# Patient Record
Sex: FEMALE | Race: WHITE | NOT HISPANIC OR LATINO | Employment: OTHER | URBAN - METROPOLITAN AREA
[De-identification: names, ages, dates, MRNs, and addresses within clinical notes are randomized per-mention and may not be internally consistent; named-entity substitution may affect disease eponyms.]

---

## 2017-04-11 ENCOUNTER — HISTORICAL (OUTPATIENT)
Dept: RADIOLOGY | Facility: HOSPITAL | Age: 57
End: 2017-04-11

## 2017-04-12 ENCOUNTER — HISTORICAL (OUTPATIENT)
Dept: HEMATOLOGY/ONCOLOGY | Facility: CLINIC | Age: 57
End: 2017-04-12

## 2017-06-01 ENCOUNTER — HISTORICAL (OUTPATIENT)
Dept: LAB | Facility: HOSPITAL | Age: 57
End: 2017-06-01

## 2017-06-01 LAB
ABS NEUT (OLG): 6.91 X10(3)/MCL (ref 2.1–9.2)
ALBUMIN SERPL-MCNC: 3.4 GM/DL (ref 3.4–5)
ALBUMIN/GLOB SERPL: 0.9 RATIO (ref 1.1–2)
ALP SERPL-CCNC: 77 UNIT/L (ref 38–126)
ALT SERPL-CCNC: 25 UNIT/L (ref 12–78)
APPEARANCE, UA: CLEAR
APTT PPP: 27 SECOND(S) (ref 20.6–36)
AST SERPL-CCNC: 24 UNIT/L (ref 15–37)
BACTERIA SPEC CULT: NORMAL /HPF
BASOPHILS # BLD AUTO: 0.1 X10(3)/MCL (ref 0–0.2)
BASOPHILS NFR BLD AUTO: 1 %
BILIRUB SERPL-MCNC: 0.6 MG/DL (ref 0.2–1)
BILIRUB UR QL STRIP: NEGATIVE
BILIRUBIN DIRECT+TOT PNL SERPL-MCNC: 0.2 MG/DL (ref 0–0.5)
BILIRUBIN DIRECT+TOT PNL SERPL-MCNC: 0.4 MG/DL (ref 0–0.8)
BUN SERPL-MCNC: 9 MG/DL (ref 7–18)
CALCIUM SERPL-MCNC: 9 MG/DL (ref 8.5–10.1)
CHLORIDE SERPL-SCNC: 104 MMOL/L (ref 98–107)
CO2 SERPL-SCNC: 24 MMOL/L (ref 21–32)
COLOR UR: YELLOW
CREAT SERPL-MCNC: 0.57 MG/DL (ref 0.55–1.02)
EOSINOPHIL # BLD AUTO: 0.2 X10(3)/MCL (ref 0–0.9)
EOSINOPHIL NFR BLD AUTO: 2 %
ERYTHROCYTE [DISTWIDTH] IN BLOOD BY AUTOMATED COUNT: 14.2 % (ref 11.5–17)
GLOBULIN SER-MCNC: 3.9 GM/DL (ref 2.4–3.5)
GLUCOSE (UA): NEGATIVE
GLUCOSE SERPL-MCNC: 99 MG/DL (ref 74–106)
HCT VFR BLD AUTO: 44.5 % (ref 37–47)
HGB BLD-MCNC: 15.1 GM/DL (ref 12–16)
HGB UR QL STRIP: NEGATIVE
INR PPP: 0.99 (ref 0–1.27)
KETONES UR QL STRIP: NEGATIVE
LEUKOCYTE ESTERASE UR QL STRIP: NEGATIVE
LYMPHOCYTES # BLD AUTO: 2.3 X10(3)/MCL (ref 0.6–4.6)
LYMPHOCYTES NFR BLD AUTO: 22 %
MCH RBC QN AUTO: 32.1 PG (ref 27–31)
MCHC RBC AUTO-ENTMCNC: 33.9 GM/DL (ref 33–36)
MCV RBC AUTO: 94.5 FL (ref 80–94)
MONOCYTES # BLD AUTO: 0.8 X10(3)/MCL (ref 0.1–1.3)
MONOCYTES NFR BLD AUTO: 8 %
NEUTROPHILS # BLD AUTO: 6.91 X10(3)/MCL (ref 1.4–7.9)
NEUTROPHILS NFR BLD AUTO: 67 %
NITRITE UR QL STRIP: NEGATIVE
PH UR STRIP: 5 [PH] (ref 5–9)
PLATELET # BLD AUTO: 181 X10(3)/MCL (ref 130–400)
PMV BLD AUTO: 9.7 FL (ref 9.4–12.4)
POTASSIUM SERPL-SCNC: 4 MMOL/L (ref 3.5–5.1)
PROT SERPL-MCNC: 7.3 GM/DL (ref 6.4–8.2)
PROT UR QL STRIP: NEGATIVE
PROTHROMBIN TIME: 12.9 SECOND(S) (ref 12.1–14.2)
RBC # BLD AUTO: 4.71 X10(6)/MCL (ref 4.2–5.4)
RBC #/AREA URNS HPF: NORMAL /[HPF]
SODIUM SERPL-SCNC: 140 MMOL/L (ref 136–145)
SP GR UR STRIP: 1.01 (ref 1–1.03)
SQUAMOUS EPITHELIAL, UA: NORMAL
UROBILINOGEN UR STRIP-ACNC: 0.2
WBC # SPEC AUTO: 10.3 X10(3)/MCL (ref 4.5–11.5)
WBC #/AREA URNS HPF: NORMAL /[HPF]

## 2017-07-13 ENCOUNTER — HISTORICAL (OUTPATIENT)
Dept: ADMINISTRATIVE | Facility: HOSPITAL | Age: 57
End: 2017-07-13

## 2017-07-13 LAB
ABS NEUT (OLG): 5.8 X10(3)/MCL (ref 2.1–9.2)
ALBUMIN SERPL-MCNC: 3.9 GM/DL (ref 3.4–5)
ALP SERPL-CCNC: 75 UNIT/L (ref 45–117)
ALT SERPL-CCNC: 27 UNIT/L (ref 12–78)
AST SERPL-CCNC: 27 UNIT/L (ref 15–37)
BASOPHILS # BLD AUTO: 0.06 X10(3)/MCL
BASOPHILS NFR BLD AUTO: 1 % (ref 0–1)
BILIRUB SERPL-MCNC: 0.4 MG/DL (ref 0.2–1)
BILIRUBIN DIRECT+TOT PNL SERPL-MCNC: <0.1 MG/DL
BILIRUBIN DIRECT+TOT PNL SERPL-MCNC: >0.3 MG/DL
BUN SERPL-MCNC: 11 MG/DL (ref 7–18)
CALCIUM SERPL-MCNC: 9.3 MG/DL (ref 8.5–10.1)
CHLORIDE SERPL-SCNC: 102 MMOL/L (ref 98–107)
CO2 SERPL-SCNC: 26 MMOL/L (ref 21–32)
CREAT SERPL-MCNC: 0.8 MG/DL (ref 0.6–1.3)
EOSINOPHIL # BLD AUTO: 0.31 10*3/UL
EOSINOPHIL NFR BLD AUTO: 3 % (ref 0–5)
ERYTHROCYTE [DISTWIDTH] IN BLOOD BY AUTOMATED COUNT: 13.7 % (ref 11.5–14.5)
GLUCOSE SERPL-MCNC: 133 MG/DL (ref 74–106)
HCT VFR BLD AUTO: 42.3 % (ref 35–46)
HGB BLD-MCNC: 14.4 GM/DL (ref 12–16)
IMM GRANULOCYTES # BLD AUTO: 0.03 10*3/UL
IMM GRANULOCYTES NFR BLD AUTO: 0 %
LYMPHOCYTES # BLD AUTO: 2.72 X10(3)/MCL
LYMPHOCYTES NFR BLD AUTO: 28 % (ref 15–40)
MCH RBC QN AUTO: 31.6 PG (ref 26–34)
MCHC RBC AUTO-ENTMCNC: 34 GM/DL (ref 31–37)
MCV RBC AUTO: 92.8 FL (ref 80–100)
MONOCYTES # BLD AUTO: 0.76 X10(3)/MCL
MONOCYTES NFR BLD AUTO: 8 % (ref 4–12)
NEUTROPHILS # BLD AUTO: 5.8 X10(3)/MCL
NEUTROPHILS NFR BLD AUTO: 60 X10(3)/MCL
PLATELET # BLD AUTO: 221 X10(3)/MCL (ref 130–400)
PMV BLD AUTO: 9.6 FL (ref 7.4–10.4)
POTASSIUM SERPL-SCNC: 3.6 MMOL/L (ref 3.5–5.1)
PROT SERPL-MCNC: 7.6 GM/DL (ref 6.4–8.2)
RBC # BLD AUTO: 4.56 X10(6)/MCL (ref 4–5.2)
SODIUM SERPL-SCNC: 139 MMOL/L (ref 136–145)
WBC # SPEC AUTO: 9.7 X10(3)/MCL (ref 4.5–11)

## 2018-01-15 ENCOUNTER — HISTORICAL (OUTPATIENT)
Dept: RADIOLOGY | Facility: HOSPITAL | Age: 58
End: 2018-01-15

## 2018-01-15 LAB
ABS NEUT (OLG): 5.17 X10(3)/MCL (ref 2.1–9.2)
ALBUMIN SERPL-MCNC: 3.5 GM/DL (ref 3.4–5)
ALBUMIN/GLOB SERPL: 1 RATIO (ref 1–2)
ALP SERPL-CCNC: 176 UNIT/L (ref 45–117)
ALT SERPL-CCNC: 193 UNIT/L (ref 12–78)
AST SERPL-CCNC: 276 UNIT/L (ref 15–37)
BASOPHILS # BLD AUTO: 0.06 X10(3)/MCL
BASOPHILS NFR BLD AUTO: 1 % (ref 0–1)
BILIRUB SERPL-MCNC: 0.4 MG/DL (ref 0.2–1)
BILIRUBIN DIRECT+TOT PNL SERPL-MCNC: 0.2 MG/DL
BILIRUBIN DIRECT+TOT PNL SERPL-MCNC: 0.2 MG/DL
BUN SERPL-MCNC: 7 MG/DL (ref 7–18)
CALCIUM SERPL-MCNC: 8.9 MG/DL (ref 8.5–10.1)
CHLORIDE SERPL-SCNC: 102 MMOL/L (ref 98–107)
CO2 SERPL-SCNC: 29 MMOL/L (ref 21–32)
CREAT SERPL-MCNC: 0.5 MG/DL (ref 0.6–1.3)
EOSINOPHIL # BLD AUTO: 0.06 X10(3)/MCL
EOSINOPHIL NFR BLD AUTO: 1 % (ref 0–5)
ERYTHROCYTE [DISTWIDTH] IN BLOOD BY AUTOMATED COUNT: 14.5 % (ref 11.5–14.5)
GLOBULIN SER-MCNC: 4.4 GM/ML (ref 2.3–3.5)
GLUCOSE SERPL-MCNC: 99 MG/DL (ref 74–106)
HCT VFR BLD AUTO: 41.3 % (ref 35–46)
HGB BLD-MCNC: 14.2 GM/DL (ref 12–16)
IMM GRANULOCYTES # BLD AUTO: 0.01 10*3/UL
IMM GRANULOCYTES NFR BLD AUTO: 0 %
LYMPHOCYTES # BLD AUTO: 1.83 X10(3)/MCL
LYMPHOCYTES NFR BLD AUTO: 23 % (ref 15–40)
MCH RBC QN AUTO: 33.3 PG (ref 26–34)
MCHC RBC AUTO-ENTMCNC: 34.4 GM/DL (ref 31–37)
MCV RBC AUTO: 96.7 FL (ref 80–100)
MONOCYTES # BLD AUTO: 0.7 X10(3)/MCL
MONOCYTES NFR BLD AUTO: 9 % (ref 4–12)
NEUTROPHILS # BLD AUTO: 5.17 X10(3)/MCL
NEUTROPHILS NFR BLD AUTO: 66 X10(3)/MCL
PLATELET # BLD AUTO: 183 X10(3)/MCL (ref 130–400)
PMV BLD AUTO: 10.4 FL (ref 7.4–10.4)
POTASSIUM SERPL-SCNC: 4.2 MMOL/L (ref 3.5–5.1)
PROT SERPL-MCNC: 7.9 GM/DL (ref 6.4–8.2)
RBC # BLD AUTO: 4.27 X10(6)/MCL (ref 4–5.2)
SODIUM SERPL-SCNC: 139 MMOL/L (ref 136–145)
WBC # SPEC AUTO: 7.8 X10(3)/MCL (ref 4.5–11)

## 2018-01-23 ENCOUNTER — HISTORICAL (OUTPATIENT)
Dept: ADMINISTRATIVE | Facility: HOSPITAL | Age: 58
End: 2018-01-23

## 2018-01-23 LAB
ABS NEUT (OLG): 5.58 X10(3)/MCL (ref 2.1–9.2)
ALBUMIN SERPL-MCNC: 3.9 GM/DL (ref 3.4–5)
ALBUMIN/GLOB SERPL: 1 RATIO (ref 1–2)
ALP SERPL-CCNC: 153 UNIT/L (ref 45–117)
ALT SERPL-CCNC: 95 UNIT/L (ref 12–78)
AST SERPL-CCNC: 93 UNIT/L (ref 15–37)
BASOPHILS # BLD AUTO: 0.06 X10(3)/MCL
BASOPHILS NFR BLD AUTO: 1 % (ref 0–1)
BILIRUB SERPL-MCNC: 0.6 MG/DL (ref 0.2–1)
BILIRUBIN DIRECT+TOT PNL SERPL-MCNC: 0.2 MG/DL
BILIRUBIN DIRECT+TOT PNL SERPL-MCNC: 0.4 MG/DL
BUN SERPL-MCNC: 9 MG/DL (ref 7–18)
CALCIUM SERPL-MCNC: 9.7 MG/DL (ref 8.5–10.1)
CHLORIDE SERPL-SCNC: 105 MMOL/L (ref 98–107)
CO2 SERPL-SCNC: 28 MMOL/L (ref 21–32)
CREAT SERPL-MCNC: 0.6 MG/DL (ref 0.6–1.3)
EOSINOPHIL # BLD AUTO: 0.07 10*3/UL
EOSINOPHIL NFR BLD AUTO: 1 % (ref 0–5)
ERYTHROCYTE [DISTWIDTH] IN BLOOD BY AUTOMATED COUNT: 14.3 % (ref 11.5–14.5)
GLOBULIN SER-MCNC: 4.3 GM/ML (ref 2.3–3.5)
GLUCOSE SERPL-MCNC: 108 MG/DL (ref 74–106)
HCT VFR BLD AUTO: 43 % (ref 35–46)
HGB BLD-MCNC: 14.8 GM/DL (ref 12–16)
IMM GRANULOCYTES # BLD AUTO: 0.05 10*3/UL
IMM GRANULOCYTES NFR BLD AUTO: 1 %
LYMPHOCYTES # BLD AUTO: 1.82 X10(3)/MCL
LYMPHOCYTES NFR BLD AUTO: 22 % (ref 15–40)
MCH RBC QN AUTO: 33.4 PG (ref 26–34)
MCHC RBC AUTO-ENTMCNC: 34.4 GM/DL (ref 31–37)
MCV RBC AUTO: 97.1 FL (ref 80–100)
MONOCYTES # BLD AUTO: 0.64 X10(3)/MCL
MONOCYTES NFR BLD AUTO: 8 % (ref 4–12)
NEUTROPHILS # BLD AUTO: 5.58 X10(3)/MCL
NEUTROPHILS NFR BLD AUTO: 68 X10(3)/MCL
PLATELET # BLD AUTO: 188 X10(3)/MCL (ref 130–400)
PMV BLD AUTO: 10 FL (ref 7.4–10.4)
POTASSIUM SERPL-SCNC: 3.8 MMOL/L (ref 3.5–5.1)
PROT SERPL-MCNC: 8.2 GM/DL (ref 6.4–8.2)
RBC # BLD AUTO: 4.43 X10(6)/MCL (ref 4–5.2)
SODIUM SERPL-SCNC: 143 MMOL/L (ref 136–145)
WBC # SPEC AUTO: 8.2 X10(3)/MCL (ref 4.5–11)

## 2018-07-24 ENCOUNTER — HISTORICAL (OUTPATIENT)
Dept: RADIOLOGY | Facility: HOSPITAL | Age: 58
End: 2018-07-24

## 2018-07-24 LAB
ABS NEUT (OLG): 3.42 X10(3)/MCL (ref 2.1–9.2)
ALBUMIN SERPL-MCNC: 3.6 GM/DL (ref 3.4–5)
ALBUMIN/GLOB SERPL: 1 RATIO (ref 1–2)
ALP SERPL-CCNC: 87 UNIT/L (ref 45–117)
ALT SERPL-CCNC: 58 UNIT/L (ref 12–78)
AST SERPL-CCNC: 89 UNIT/L (ref 15–37)
BASOPHILS # BLD AUTO: 0.06 X10(3)/MCL
BASOPHILS NFR BLD AUTO: 1 %
BILIRUB SERPL-MCNC: 0.7 MG/DL (ref 0.2–1)
BILIRUBIN DIRECT+TOT PNL SERPL-MCNC: 0.2 MG/DL
BILIRUBIN DIRECT+TOT PNL SERPL-MCNC: 0.5 MG/DL
BUN SERPL-MCNC: 9 MG/DL (ref 7–18)
CALCIUM SERPL-MCNC: 9.5 MG/DL (ref 8.5–10.1)
CHLORIDE SERPL-SCNC: 108 MMOL/L (ref 98–107)
CO2 SERPL-SCNC: 26 MMOL/L (ref 21–32)
CREAT SERPL-MCNC: 0.7 MG/DL (ref 0.6–1.3)
EOSINOPHIL # BLD AUTO: 0.22 X10(3)/MCL
EOSINOPHIL NFR BLD AUTO: 3 %
ERYTHROCYTE [DISTWIDTH] IN BLOOD BY AUTOMATED COUNT: 12.4 % (ref 11.5–14.5)
GLOBULIN SER-MCNC: 3.9 GM/ML (ref 2.3–3.5)
GLUCOSE SERPL-MCNC: 109 MG/DL (ref 74–106)
HCT VFR BLD AUTO: 45.2 % (ref 35–46)
HGB BLD-MCNC: 15.2 GM/DL (ref 12–16)
IMM GRANULOCYTES # BLD AUTO: 0.01 10*3/UL
IMM GRANULOCYTES NFR BLD AUTO: 0 %
LYMPHOCYTES # BLD AUTO: 2.54 X10(3)/MCL
LYMPHOCYTES NFR BLD AUTO: 37 % (ref 13–40)
MCH RBC QN AUTO: 32.5 PG (ref 26–34)
MCHC RBC AUTO-ENTMCNC: 33.6 GM/DL (ref 31–37)
MCV RBC AUTO: 96.8 FL (ref 80–100)
MONOCYTES # BLD AUTO: 0.58 X10(3)/MCL
MONOCYTES NFR BLD AUTO: 8 % (ref 4–12)
NEUTROPHILS # BLD AUTO: 3.42 X10(3)/MCL
NEUTROPHILS NFR BLD AUTO: 50 X10(3)/MCL
PLATELET # BLD AUTO: 191 X10(3)/MCL (ref 130–400)
PMV BLD AUTO: 9.9 FL (ref 7.4–10.4)
POTASSIUM SERPL-SCNC: 4.3 MMOL/L (ref 3.5–5.1)
PROT SERPL-MCNC: 7.5 GM/DL (ref 6.4–8.2)
RBC # BLD AUTO: 4.67 X10(6)/MCL (ref 4–5.2)
SODIUM SERPL-SCNC: 143 MMOL/L (ref 136–145)
WBC # SPEC AUTO: 6.8 X10(3)/MCL (ref 4.5–11)

## 2018-08-01 ENCOUNTER — HISTORICAL (OUTPATIENT)
Dept: ADMINISTRATIVE | Facility: HOSPITAL | Age: 58
End: 2018-08-01

## 2018-08-01 LAB
ABS NEUT (OLG): 4.48 X10(3)/MCL (ref 2.1–9.2)
ALBUMIN SERPL-MCNC: 3.5 GM/DL (ref 3.4–5)
ALBUMIN/GLOB SERPL: 1 RATIO (ref 1–2)
ALP SERPL-CCNC: 84 UNIT/L (ref 45–117)
ALT SERPL-CCNC: 64 UNIT/L (ref 12–78)
AST SERPL-CCNC: 55 UNIT/L (ref 15–37)
BASOPHILS # BLD AUTO: 0.07 X10(3)/MCL
BASOPHILS NFR BLD AUTO: 1 %
BILIRUB SERPL-MCNC: 0.3 MG/DL (ref 0.2–1)
BILIRUBIN DIRECT+TOT PNL SERPL-MCNC: <0.1 MG/DL
BILIRUBIN DIRECT+TOT PNL SERPL-MCNC: ABNORMAL MG/DL
BUN SERPL-MCNC: 14 MG/DL (ref 7–18)
CALCIUM SERPL-MCNC: 8.4 MG/DL (ref 8.5–10.1)
CHLORIDE SERPL-SCNC: 103 MMOL/L (ref 98–107)
CO2 SERPL-SCNC: 26 MMOL/L (ref 21–32)
CREAT SERPL-MCNC: 0.9 MG/DL (ref 0.6–1.3)
EOSINOPHIL # BLD AUTO: 0.17 10*3/UL
EOSINOPHIL NFR BLD AUTO: 2 %
ERYTHROCYTE [DISTWIDTH] IN BLOOD BY AUTOMATED COUNT: 12.5 % (ref 11.5–14.5)
GLOBULIN SER-MCNC: 3.9 GM/ML (ref 2.3–3.5)
GLUCOSE SERPL-MCNC: 89 MG/DL (ref 74–106)
HCT VFR BLD AUTO: 41.9 % (ref 35–46)
HGB BLD-MCNC: 14.2 GM/DL (ref 12–16)
IMM GRANULOCYTES # BLD AUTO: 0.03 10*3/UL
IMM GRANULOCYTES NFR BLD AUTO: 0 %
LYMPHOCYTES # BLD AUTO: 2.81 X10(3)/MCL
LYMPHOCYTES NFR BLD AUTO: 34 % (ref 13–40)
MCH RBC QN AUTO: 32.9 PG (ref 26–34)
MCHC RBC AUTO-ENTMCNC: 33.9 GM/DL (ref 31–37)
MCV RBC AUTO: 97.2 FL (ref 80–100)
MONOCYTES # BLD AUTO: 0.62 X10(3)/MCL
MONOCYTES NFR BLD AUTO: 8 % (ref 4–12)
NEUTROPHILS # BLD AUTO: 4.48 X10(3)/MCL
NEUTROPHILS NFR BLD AUTO: 55 X10(3)/MCL
PLATELET # BLD AUTO: 140 X10(3)/MCL (ref 130–400)
PMV BLD AUTO: 10 FL (ref 7.4–10.4)
POTASSIUM SERPL-SCNC: 3.6 MMOL/L (ref 3.5–5.1)
PROT SERPL-MCNC: 7.4 GM/DL (ref 6.4–8.2)
RBC # BLD AUTO: 4.31 X10(6)/MCL (ref 4–5.2)
SODIUM SERPL-SCNC: 139 MMOL/L (ref 136–145)
WBC # SPEC AUTO: 8.2 X10(3)/MCL (ref 4.5–11)

## 2019-01-30 ENCOUNTER — HISTORICAL (OUTPATIENT)
Dept: HEMATOLOGY/ONCOLOGY | Facility: CLINIC | Age: 59
End: 2019-01-30

## 2019-01-30 LAB
ABS NEUT (OLG): 4.07 X10(3)/MCL (ref 2.1–9.2)
ALBUMIN SERPL-MCNC: 3.9 GM/DL (ref 3.4–5)
ALBUMIN/GLOB SERPL: 0.8 RATIO (ref 1.1–2)
ALP SERPL-CCNC: 86 UNIT/L (ref 45–117)
ALT SERPL-CCNC: 62 UNIT/L (ref 12–78)
AST SERPL-CCNC: 55 UNIT/L (ref 15–37)
BASOPHILS # BLD AUTO: 0.09 X10(3)/MCL
BASOPHILS NFR BLD AUTO: 1 %
BILIRUB SERPL-MCNC: 0.5 MG/DL (ref 0.2–1)
BILIRUBIN DIRECT+TOT PNL SERPL-MCNC: 0.2 MG/DL
BILIRUBIN DIRECT+TOT PNL SERPL-MCNC: 0.3 MG/DL
BUN SERPL-MCNC: 14 MG/DL (ref 7–18)
CALCIUM SERPL-MCNC: 9.9 MG/DL (ref 8.5–10.1)
CHLORIDE SERPL-SCNC: 103 MMOL/L (ref 98–107)
CO2 SERPL-SCNC: 31 MMOL/L (ref 21–32)
CREAT SERPL-MCNC: 0.9 MG/DL (ref 0.6–1.3)
EOSINOPHIL # BLD AUTO: 0.22 X10(3)/MCL
EOSINOPHIL NFR BLD AUTO: 3 %
ERYTHROCYTE [DISTWIDTH] IN BLOOD BY AUTOMATED COUNT: 13.3 % (ref 11.5–14.5)
GLOBULIN SER-MCNC: 4.7 GM/ML (ref 2.3–3.5)
GLUCOSE SERPL-MCNC: 80 MG/DL (ref 74–106)
HCT VFR BLD AUTO: 45.5 % (ref 35–46)
HGB BLD-MCNC: 15.1 GM/DL (ref 12–16)
IMM GRANULOCYTES # BLD AUTO: 0.02 10*3/UL
IMM GRANULOCYTES NFR BLD AUTO: 0 %
LYMPHOCYTES # BLD AUTO: 2.8 X10(3)/MCL
LYMPHOCYTES NFR BLD AUTO: 35 % (ref 13–40)
MCH RBC QN AUTO: 31.9 PG (ref 26–34)
MCHC RBC AUTO-ENTMCNC: 33.2 GM/DL (ref 31–37)
MCV RBC AUTO: 96.2 FL (ref 80–100)
MONOCYTES # BLD AUTO: 0.87 X10(3)/MCL
MONOCYTES NFR BLD AUTO: 11 % (ref 4–12)
NEUTROPHILS # BLD AUTO: 4.07 X10(3)/MCL
NEUTROPHILS NFR BLD AUTO: 50 X10(3)/MCL
PLATELET # BLD AUTO: 202 X10(3)/MCL (ref 130–400)
PMV BLD AUTO: 10.1 FL (ref 7.4–10.4)
POTASSIUM SERPL-SCNC: 4.1 MMOL/L (ref 3.5–5.1)
PROT SERPL-MCNC: 8.6 GM/DL (ref 6.4–8.2)
RBC # BLD AUTO: 4.73 X10(6)/MCL (ref 4–5.2)
SODIUM SERPL-SCNC: 139 MMOL/L (ref 136–145)
WBC # SPEC AUTO: 8.1 X10(3)/MCL (ref 4.5–11)

## 2019-02-01 ENCOUNTER — HISTORICAL (OUTPATIENT)
Dept: RADIOLOGY | Facility: HOSPITAL | Age: 59
End: 2019-02-01

## 2019-04-04 ENCOUNTER — HISTORICAL (OUTPATIENT)
Dept: RADIOLOGY | Facility: HOSPITAL | Age: 59
End: 2019-04-04

## 2019-04-10 ENCOUNTER — HISTORICAL (OUTPATIENT)
Dept: ADMINISTRATIVE | Facility: HOSPITAL | Age: 59
End: 2019-04-10

## 2019-04-10 LAB
ABS NEUT (OLG): 4.69 X10(3)/MCL (ref 2.1–9.2)
ALBUMIN SERPL-MCNC: 4 GM/DL (ref 3.4–5)
ALBUMIN/GLOB SERPL: 1 RATIO (ref 1.1–2)
ALP SERPL-CCNC: 82 UNIT/L (ref 45–117)
ALT SERPL-CCNC: 48 UNIT/L (ref 12–78)
AST SERPL-CCNC: 71 UNIT/L (ref 15–37)
BASOPHILS # BLD AUTO: 0.06 X10(3)/MCL
BASOPHILS NFR BLD AUTO: 1 %
BILIRUB SERPL-MCNC: 0.8 MG/DL (ref 0.2–1)
BILIRUBIN DIRECT+TOT PNL SERPL-MCNC: 0.3 MG/DL
BILIRUBIN DIRECT+TOT PNL SERPL-MCNC: 0.5 MG/DL
BUN SERPL-MCNC: 11 MG/DL (ref 7–18)
CALCIUM SERPL-MCNC: 9.4 MG/DL (ref 8.5–10.1)
CHLORIDE SERPL-SCNC: 108 MMOL/L (ref 98–107)
CO2 SERPL-SCNC: 28 MMOL/L (ref 21–32)
CREAT SERPL-MCNC: 0.7 MG/DL (ref 0.6–1.3)
EOSINOPHIL # BLD AUTO: 0.15 X10(3)/MCL
EOSINOPHIL NFR BLD AUTO: 2 %
ERYTHROCYTE [DISTWIDTH] IN BLOOD BY AUTOMATED COUNT: 13.5 % (ref 11.5–14.5)
FERRITIN SERPL-MCNC: 66.4 NG/ML (ref 8–388)
GLOBULIN SER-MCNC: 4.2 GM/ML (ref 2.3–3.5)
GLUCOSE SERPL-MCNC: 103 MG/DL (ref 74–106)
HAV AB SER QL IA: REACTIVE
HBV CORE AB SERPL QL IA: NONREACTIVE
HBV SURFACE AB SER-ACNC: <3.1 MIU/ML
HBV SURFACE AB SERPL IA-ACNC: NONREACTIVE M[IU]/ML
HBV SURFACE AG SERPL QL IA: NEGATIVE
HCT VFR BLD AUTO: 46.4 % (ref 35–46)
HGB BLD-MCNC: 15.3 GM/DL (ref 12–16)
HIV 1+2 AB+HIV1 P24 AG SERPL QL IA: NONREACTIVE
IMM GRANULOCYTES # BLD AUTO: 0.02 10*3/UL
IMM GRANULOCYTES NFR BLD AUTO: 0 %
INR PPP: 1 (ref 0.9–1.2)
IRON SATN MFR SERPL: 70.1 % (ref 15–50)
IRON SERPL-MCNC: 246 MCG/DL (ref 50–170)
LYMPHOCYTES # BLD AUTO: 1.75 X10(3)/MCL
LYMPHOCYTES NFR BLD AUTO: 24 % (ref 13–40)
MCH RBC QN AUTO: 31.9 PG (ref 26–34)
MCHC RBC AUTO-ENTMCNC: 33 GM/DL (ref 31–37)
MCV RBC AUTO: 96.9 FL (ref 80–100)
MONOCYTES # BLD AUTO: 0.56 X10(3)/MCL
MONOCYTES NFR BLD AUTO: 8 % (ref 4–12)
NEUTROPHILS # BLD AUTO: 4.69 X10(3)/MCL
NEUTROPHILS NFR BLD AUTO: 65 X10(3)/MCL
PLATELET # BLD AUTO: 182 X10(3)/MCL (ref 130–400)
PMV BLD AUTO: 10.5 FL (ref 7.4–10.4)
POTASSIUM SERPL-SCNC: 4.2 MMOL/L (ref 3.5–5.1)
PROT SERPL-MCNC: 8.2 GM/DL (ref 6.4–8.2)
PROTHROMBIN TIME: 13.1 SECOND(S) (ref 11.9–14.4)
RBC # BLD AUTO: 4.79 X10(6)/MCL (ref 4–5.2)
SODIUM SERPL-SCNC: 141 MMOL/L (ref 136–145)
T PALLIDUM AB SER QL: NONREACTIVE
T4 FREE SERPL-MCNC: 0.78 NG/DL (ref 0.76–1.46)
TIBC SERPL-MCNC: 351 MCG/DL (ref 250–450)
TRANSFERRIN SERPL-MCNC: 292 MG/DL (ref 200–360)
TSH SERPL-ACNC: 0.9 MIU/L (ref 0.36–3.74)
WBC # SPEC AUTO: 7.2 X10(3)/MCL (ref 4.5–11)

## 2019-05-14 ENCOUNTER — HISTORICAL (OUTPATIENT)
Dept: ADMINISTRATIVE | Facility: HOSPITAL | Age: 59
End: 2019-05-14

## 2019-05-14 LAB
FLUAV AG NPH QL IA: NEGATIVE
FLUBV AG NPH QL IA: NEGATIVE

## 2019-06-27 ENCOUNTER — HISTORICAL (OUTPATIENT)
Dept: LAB | Facility: HOSPITAL | Age: 59
End: 2019-06-27

## 2019-06-27 LAB
ALBUMIN SERPL-MCNC: 3.9 GM/DL (ref 3.4–5)
ALBUMIN/GLOB SERPL: 0.9 RATIO (ref 1.1–2)
ALP SERPL-CCNC: 86 UNIT/L (ref 45–117)
ALT SERPL-CCNC: 48 UNIT/L (ref 12–78)
APTT PPP: 25.2 SECOND(S) (ref 23.3–37)
AST SERPL-CCNC: 67 UNIT/L (ref 15–37)
BILIRUB SERPL-MCNC: 0.7 MG/DL (ref 0.2–1)
BILIRUBIN DIRECT+TOT PNL SERPL-MCNC: 0.2 MG/DL
BILIRUBIN DIRECT+TOT PNL SERPL-MCNC: 0.5 MG/DL
BUN SERPL-MCNC: 7 MG/DL (ref 7–18)
CALCIUM SERPL-MCNC: 9.6 MG/DL (ref 8.5–10.1)
CHLORIDE SERPL-SCNC: 107 MMOL/L (ref 98–107)
CO2 SERPL-SCNC: 25 MMOL/L (ref 21–32)
CREAT SERPL-MCNC: 0.9 MG/DL (ref 0.6–1.3)
CRP SERPL-MCNC: <0.3 MG/DL
DEPRECATED CALCIDIOL+CALCIFEROL SERPL-MC: 43.25 NG/ML (ref 30–80)
ERYTHROCYTE [DISTWIDTH] IN BLOOD BY AUTOMATED COUNT: 14.6 % (ref 11.5–14.5)
ERYTHROCYTE [SEDIMENTATION RATE] IN BLOOD: 14 MM/HR (ref 0–20)
GLOBULIN SER-MCNC: 4.2 GM/ML (ref 2.3–3.5)
GLUCOSE SERPL-MCNC: 99 MG/DL (ref 74–106)
HCT VFR BLD AUTO: 42 % (ref 35–46)
HGB BLD-MCNC: 14 GM/DL (ref 12–16)
INR PPP: 1.08 (ref 0.9–1.2)
MCH RBC QN AUTO: 31.7 PG (ref 26–34)
MCHC RBC AUTO-ENTMCNC: 33.3 GM/DL (ref 31–37)
MCV RBC AUTO: 95.2 FL (ref 80–100)
PLATELET # BLD AUTO: 202 X10(3)/MCL (ref 130–400)
PMV BLD AUTO: 9.6 FL (ref 7.4–10.4)
POTASSIUM SERPL-SCNC: 3.8 MMOL/L (ref 3.5–5.1)
PREALB SERPL-MCNC: 38.3 MG/DL (ref 20–40)
PROT SERPL-MCNC: 8.1 GM/DL (ref 6.4–8.2)
PROTHROMBIN TIME: 13.9 SECOND(S) (ref 11.9–14.4)
RBC # BLD AUTO: 4.41 X10(6)/MCL (ref 4–5.2)
SODIUM SERPL-SCNC: 139 MMOL/L (ref 136–145)
TRANSFERRIN SERPL-MCNC: 266 MG/DL (ref 200–360)
WBC # SPEC AUTO: 9 X10(3)/MCL (ref 4.5–11)

## 2019-09-09 ENCOUNTER — HISTORICAL (OUTPATIENT)
Dept: RADIOLOGY | Facility: HOSPITAL | Age: 59
End: 2019-09-09

## 2019-09-16 ENCOUNTER — HISTORICAL (OUTPATIENT)
Dept: ADMINISTRATIVE | Facility: HOSPITAL | Age: 59
End: 2019-09-16

## 2019-09-16 LAB
ABS NEUT (OLG): 5.86 X10(3)/MCL (ref 2.1–9.2)
ALBUMIN SERPL-MCNC: 3.6 GM/DL (ref 3.4–5)
ALBUMIN/GLOB SERPL: 0.9 RATIO (ref 1.1–2)
ALP SERPL-CCNC: 87 UNIT/L (ref 45–117)
ALT SERPL-CCNC: 60 UNIT/L (ref 12–78)
AST SERPL-CCNC: 67 UNIT/L (ref 15–37)
BASOPHILS # BLD AUTO: 0 X10(3)/MCL (ref 0–0.2)
BASOPHILS NFR BLD AUTO: 0 %
BILIRUB SERPL-MCNC: 0.3 MG/DL (ref 0.2–1)
BILIRUBIN DIRECT+TOT PNL SERPL-MCNC: <0.1 MG/DL (ref 0–0.2)
BILIRUBIN DIRECT+TOT PNL SERPL-MCNC: >0.2 MG/DL
BUN SERPL-MCNC: 11 MG/DL (ref 7–18)
CALCIUM SERPL-MCNC: 8.7 MG/DL (ref 8.5–10.1)
CHLORIDE SERPL-SCNC: 106 MMOL/L (ref 98–107)
CO2 SERPL-SCNC: 26 MMOL/L (ref 21–32)
CREAT SERPL-MCNC: 1 MG/DL (ref 0.6–1.3)
EOSINOPHIL # BLD AUTO: 0 X10(3)/MCL (ref 0–0.9)
EOSINOPHIL NFR BLD AUTO: 0 %
ERYTHROCYTE [DISTWIDTH] IN BLOOD BY AUTOMATED COUNT: 13.2 % (ref 11.5–14.5)
GLOBULIN SER-MCNC: 3.9 GM/ML (ref 2.3–3.5)
GLUCOSE SERPL-MCNC: 105 MG/DL (ref 74–106)
HCT VFR BLD AUTO: 43.2 % (ref 35–46)
HGB BLD-MCNC: 14.1 GM/DL (ref 12–16)
IMM GRANULOCYTES # BLD AUTO: 0.03 10*3/UL
IMM GRANULOCYTES NFR BLD AUTO: 0 %
LYMPHOCYTES # BLD AUTO: 1.5 X10(3)/MCL (ref 0.6–4.6)
LYMPHOCYTES NFR BLD AUTO: 18 %
MCH RBC QN AUTO: 31.6 PG (ref 26–34)
MCHC RBC AUTO-ENTMCNC: 32.6 GM/DL (ref 31–37)
MCV RBC AUTO: 96.9 FL (ref 80–100)
MONOCYTES # BLD AUTO: 0.6 X10(3)/MCL (ref 0.1–1.3)
MONOCYTES NFR BLD AUTO: 8 %
NEUTROPHILS # BLD AUTO: 5.86 X10(3)/MCL (ref 2.1–9.2)
NEUTROPHILS NFR BLD AUTO: 72 %
PLATELET # BLD AUTO: 191 X10(3)/MCL (ref 130–400)
PMV BLD AUTO: 9.7 FL (ref 7.4–10.4)
POTASSIUM SERPL-SCNC: 3.8 MMOL/L (ref 3.5–5.1)
PROT SERPL-MCNC: 7.5 GM/DL (ref 6.4–8.2)
RBC # BLD AUTO: 4.46 X10(6)/MCL (ref 4–5.2)
SODIUM SERPL-SCNC: 142 MMOL/L (ref 136–145)
WBC # SPEC AUTO: 8.1 X10(3)/MCL (ref 4.5–11)

## 2019-11-10 LAB
INFLUENZA A ANTIGEN, POC: NEGATIVE
INFLUENZA B ANTIGEN, POC: NEGATIVE

## 2020-03-10 ENCOUNTER — HISTORICAL (OUTPATIENT)
Dept: ADMINISTRATIVE | Facility: HOSPITAL | Age: 60
End: 2020-03-10

## 2020-04-08 ENCOUNTER — HISTORICAL (OUTPATIENT)
Dept: RADIOLOGY | Facility: HOSPITAL | Age: 60
End: 2020-04-08

## 2020-04-20 ENCOUNTER — HISTORICAL (OUTPATIENT)
Dept: ADMINISTRATIVE | Facility: HOSPITAL | Age: 60
End: 2020-04-20

## 2020-04-23 ENCOUNTER — HISTORICAL (OUTPATIENT)
Dept: ADMINISTRATIVE | Facility: HOSPITAL | Age: 60
End: 2020-04-23

## 2020-04-23 LAB
ABS NEUT (OLG): 3.8 X10(3)/MCL (ref 2.1–9.2)
ALBUMIN SERPL-MCNC: 3.3 GM/DL (ref 3.4–5)
ALBUMIN/GLOB SERPL: 0.8 RATIO (ref 1.1–2)
ALP SERPL-CCNC: 106 UNIT/L (ref 45–117)
ALT SERPL-CCNC: 56 UNIT/L (ref 12–78)
AST SERPL-CCNC: 82 UNIT/L (ref 15–37)
BASOPHILS # BLD AUTO: 0.1 X10(3)/MCL (ref 0–0.2)
BASOPHILS NFR BLD AUTO: 1 %
BILIRUB SERPL-MCNC: 0.3 MG/DL (ref 0.2–1)
BILIRUBIN DIRECT+TOT PNL SERPL-MCNC: 0.1 MG/DL (ref 0–0.2)
BILIRUBIN DIRECT+TOT PNL SERPL-MCNC: 0.2 MG/DL
BUN SERPL-MCNC: 7 MG/DL (ref 7–18)
CALCIUM SERPL-MCNC: 9.2 MG/DL (ref 8.5–10.1)
CHLORIDE SERPL-SCNC: 107 MMOL/L (ref 98–107)
CO2 SERPL-SCNC: 24 MMOL/L (ref 21–32)
CREAT SERPL-MCNC: 0.7 MG/DL (ref 0.6–1.3)
EOSINOPHIL # BLD AUTO: 0.1 X10(3)/MCL (ref 0–0.9)
EOSINOPHIL NFR BLD AUTO: 1 %
ERYTHROCYTE [DISTWIDTH] IN BLOOD BY AUTOMATED COUNT: 14 % (ref 11.5–14.5)
GLOBULIN SER-MCNC: 3.9 GM/ML (ref 2.3–3.5)
GLUCOSE SERPL-MCNC: 92 MG/DL (ref 74–106)
HCT VFR BLD AUTO: 41.5 % (ref 35–46)
HGB BLD-MCNC: 13.9 GM/DL (ref 12–16)
IMM GRANULOCYTES # BLD AUTO: 0.01 10*3/UL
IMM GRANULOCYTES NFR BLD AUTO: 0 %
LYMPHOCYTES # BLD AUTO: 1.6 X10(3)/MCL (ref 0.6–4.6)
LYMPHOCYTES NFR BLD AUTO: 26 %
MCH RBC QN AUTO: 31 PG (ref 26–34)
MCHC RBC AUTO-ENTMCNC: 33.5 GM/DL (ref 31–37)
MCV RBC AUTO: 92.6 FL (ref 80–100)
MONOCYTES # BLD AUTO: 0.6 X10(3)/MCL (ref 0.1–1.3)
MONOCYTES NFR BLD AUTO: 10 %
NEUTROPHILS # BLD AUTO: 3.8 X10(3)/MCL (ref 2.1–9.2)
NEUTROPHILS NFR BLD AUTO: 61 %
PLATELET # BLD AUTO: 187 X10(3)/MCL (ref 130–400)
PMV BLD AUTO: 9.8 FL (ref 7.4–10.4)
POTASSIUM SERPL-SCNC: 4.2 MMOL/L (ref 3.5–5.1)
PROT SERPL-MCNC: 7.2 GM/DL (ref 6.4–8.2)
RBC # BLD AUTO: 4.48 X10(6)/MCL (ref 4–5.2)
SODIUM SERPL-SCNC: 140 MMOL/L (ref 136–145)
WBC # SPEC AUTO: 6.2 X10(3)/MCL (ref 4.5–11)

## 2020-06-02 ENCOUNTER — HISTORICAL (OUTPATIENT)
Dept: ADMINISTRATIVE | Facility: HOSPITAL | Age: 60
End: 2020-06-02

## 2020-06-23 ENCOUNTER — HISTORICAL (OUTPATIENT)
Dept: ADMINISTRATIVE | Facility: HOSPITAL | Age: 60
End: 2020-06-23

## 2020-08-17 ENCOUNTER — HISTORICAL (OUTPATIENT)
Dept: HEMATOLOGY/ONCOLOGY | Facility: CLINIC | Age: 60
End: 2020-08-17

## 2020-08-17 LAB
ABS NEUT (OLG): 5.38 X10(3)/MCL (ref 2.1–9.2)
ALBUMIN SERPL-MCNC: 3.9 GM/DL (ref 3.4–5)
ALBUMIN/GLOB SERPL: 1 RATIO (ref 1.1–2)
ALP SERPL-CCNC: 107 UNIT/L (ref 45–117)
ALT SERPL-CCNC: 43 UNIT/L (ref 12–78)
AST SERPL-CCNC: 41 UNIT/L (ref 15–37)
BASOPHILS # BLD AUTO: 0.1 X10(3)/MCL (ref 0–0.2)
BASOPHILS NFR BLD AUTO: 1 %
BILIRUB SERPL-MCNC: 0.5 MG/DL (ref 0.2–1)
BILIRUBIN DIRECT+TOT PNL SERPL-MCNC: 0.1 MG/DL (ref 0–0.2)
BILIRUBIN DIRECT+TOT PNL SERPL-MCNC: 0.4 MG/DL
BUN SERPL-MCNC: 10 MG/DL (ref 7–18)
CALCIUM SERPL-MCNC: 9.7 MG/DL (ref 8.5–10.1)
CHLORIDE SERPL-SCNC: 109 MMOL/L (ref 98–107)
CO2 SERPL-SCNC: 23 MMOL/L (ref 21–32)
CREAT SERPL-MCNC: 0.7 MG/DL (ref 0.6–1.3)
EOSINOPHIL # BLD AUTO: 0.1 X10(3)/MCL (ref 0–0.9)
EOSINOPHIL NFR BLD AUTO: 2 %
ERYTHROCYTE [DISTWIDTH] IN BLOOD BY AUTOMATED COUNT: 14.3 % (ref 11.5–14.5)
GLOBULIN SER-MCNC: 4.1 GM/ML (ref 2.3–3.5)
GLUCOSE SERPL-MCNC: 125 MG/DL (ref 74–106)
HCT VFR BLD AUTO: 44 % (ref 35–46)
HGB BLD-MCNC: 14.2 GM/DL (ref 12–16)
IMM GRANULOCYTES # BLD AUTO: 0.02 10*3/UL
IMM GRANULOCYTES NFR BLD AUTO: 0 %
LYMPHOCYTES # BLD AUTO: 2 X10(3)/MCL (ref 0.6–4.6)
LYMPHOCYTES NFR BLD AUTO: 24 %
MCH RBC QN AUTO: 29 PG (ref 26–34)
MCHC RBC AUTO-ENTMCNC: 32.3 GM/DL (ref 31–37)
MCV RBC AUTO: 89.8 FL (ref 80–100)
MONOCYTES # BLD AUTO: 0.7 X10(3)/MCL (ref 0.1–1.3)
MONOCYTES NFR BLD AUTO: 8 %
NEUTROPHILS # BLD AUTO: 5.38 X10(3)/MCL (ref 2.1–9.2)
NEUTROPHILS NFR BLD AUTO: 65 %
PLATELET # BLD AUTO: 248 X10(3)/MCL (ref 130–400)
PMV BLD AUTO: 9.8 FL (ref 7.4–10.4)
POTASSIUM SERPL-SCNC: 3.6 MMOL/L (ref 3.5–5.1)
PROT SERPL-MCNC: 8 GM/DL (ref 6.4–8.2)
RBC # BLD AUTO: 4.9 X10(6)/MCL (ref 4–5.2)
SODIUM SERPL-SCNC: 141 MMOL/L (ref 136–145)
WBC # SPEC AUTO: 8.3 X10(3)/MCL (ref 4.5–11)

## 2021-03-08 ENCOUNTER — HISTORICAL (OUTPATIENT)
Dept: ADMINISTRATIVE | Facility: HOSPITAL | Age: 61
End: 2021-03-08

## 2021-03-08 LAB — SARS-COV-2 AG RESP QL IA.RAPID: NEGATIVE

## 2021-03-09 ENCOUNTER — HISTORICAL (OUTPATIENT)
Dept: SURGERY | Facility: HOSPITAL | Age: 61
End: 2021-03-09

## 2021-03-09 ENCOUNTER — HISTORICAL (OUTPATIENT)
Dept: ADMINISTRATIVE | Facility: HOSPITAL | Age: 61
End: 2021-03-09

## 2021-03-17 ENCOUNTER — HISTORICAL (OUTPATIENT)
Dept: ADMINISTRATIVE | Facility: HOSPITAL | Age: 61
End: 2021-03-17

## 2021-03-23 ENCOUNTER — HOSPITAL ENCOUNTER (OUTPATIENT)
Dept: ONCOLOGY | Facility: HOSPITAL | Age: 61
End: 2021-03-26
Attending: ORTHOPAEDIC SURGERY | Admitting: ORTHOPAEDIC SURGERY

## 2021-03-24 LAB
ABS NEUT (OLG): 8.18 X10(3)/MCL (ref 2.1–9.2)
BASOPHILS # BLD AUTO: 0.1 X10(3)/MCL (ref 0–0.2)
BASOPHILS NFR BLD AUTO: 1 %
BUN SERPL-MCNC: 5.5 MG/DL (ref 9.8–20.1)
CALCIUM SERPL-MCNC: 7.8 MG/DL (ref 8.4–10.2)
CHLORIDE SERPL-SCNC: 101 MMOL/L (ref 98–107)
CO2 SERPL-SCNC: 27 MMOL/L (ref 23–31)
CREAT SERPL-MCNC: 0.82 MG/DL (ref 0.55–1.02)
CREAT/UREA NIT SERPL: 7
DEPRECATED CALCIDIOL+CALCIFEROL SERPL-MC: 41.5 NG/ML (ref 30–80)
EOSINOPHIL # BLD AUTO: 0.2 X10(3)/MCL (ref 0–0.9)
EOSINOPHIL NFR BLD AUTO: 1 %
ERYTHROCYTE [DISTWIDTH] IN BLOOD BY AUTOMATED COUNT: 14.5 % (ref 11.5–17)
GLUCOSE SERPL-MCNC: 109 MG/DL (ref 82–115)
HCT VFR BLD AUTO: 34 % (ref 37–47)
HGB BLD-MCNC: 10.2 GM/DL (ref 12–16)
LYMPHOCYTES # BLD AUTO: 1.9 X10(3)/MCL (ref 0.6–4.6)
LYMPHOCYTES NFR BLD AUTO: 16 %
MCH RBC QN AUTO: 28 PG (ref 27–31)
MCHC RBC AUTO-ENTMCNC: 30 GM/DL (ref 33–36)
MCV RBC AUTO: 93.4 FL (ref 80–94)
MONOCYTES # BLD AUTO: 1.5 X10(3)/MCL (ref 0.1–1.3)
MONOCYTES NFR BLD AUTO: 12 %
NEUTROPHILS # BLD AUTO: 8.18 X10(3)/MCL (ref 2.1–9.2)
NEUTROPHILS NFR BLD AUTO: 69 %
PLATELET # BLD AUTO: 285 X10(3)/MCL (ref 130–400)
PMV BLD AUTO: 10 FL (ref 9.4–12.4)
POTASSIUM SERPL-SCNC: 4.5 MMOL/L (ref 3.5–5.1)
RBC # BLD AUTO: 3.64 X10(6)/MCL (ref 4.2–5.4)
SODIUM SERPL-SCNC: 136 MMOL/L (ref 136–145)
WBC # SPEC AUTO: 11.8 X10(3)/MCL (ref 4.5–11.5)

## 2021-03-25 LAB
ABS NEUT (OLG): 4.15 X10(3)/MCL (ref 2.1–9.2)
BASOPHILS # BLD AUTO: 0.1 X10(3)/MCL (ref 0–0.2)
BASOPHILS NFR BLD AUTO: 1 %
BUN SERPL-MCNC: 3.2 MG/DL (ref 9.8–20.1)
CALCIUM SERPL-MCNC: 7.9 MG/DL (ref 8.4–10.2)
CHLORIDE SERPL-SCNC: 110 MMOL/L (ref 98–107)
CO2 SERPL-SCNC: 24 MMOL/L (ref 23–31)
CREAT SERPL-MCNC: 0.61 MG/DL (ref 0.55–1.02)
CREAT/UREA NIT SERPL: 5
EOSINOPHIL # BLD AUTO: 0.1 X10(3)/MCL (ref 0–0.9)
EOSINOPHIL NFR BLD AUTO: 1 %
ERYTHROCYTE [DISTWIDTH] IN BLOOD BY AUTOMATED COUNT: 14.3 % (ref 11.5–17)
GLUCOSE SERPL-MCNC: 89 MG/DL (ref 82–115)
HCT VFR BLD AUTO: 31.3 % (ref 37–47)
HGB BLD-MCNC: 9.2 GM/DL (ref 12–16)
LYMPHOCYTES # BLD AUTO: 1.8 X10(3)/MCL (ref 0.6–4.6)
LYMPHOCYTES NFR BLD AUTO: 25 %
MCH RBC QN AUTO: 28.4 PG (ref 27–31)
MCHC RBC AUTO-ENTMCNC: 29.4 GM/DL (ref 33–36)
MCV RBC AUTO: 96.6 FL (ref 80–94)
MONOCYTES # BLD AUTO: 1 X10(3)/MCL (ref 0.1–1.3)
MONOCYTES NFR BLD AUTO: 14 %
NEUTROPHILS # BLD AUTO: 4.15 X10(3)/MCL (ref 2.1–9.2)
NEUTROPHILS NFR BLD AUTO: 59 %
PLATELET # BLD AUTO: 203 X10(3)/MCL (ref 130–400)
PMV BLD AUTO: 9.9 FL (ref 9.4–12.4)
POTASSIUM SERPL-SCNC: 4 MMOL/L (ref 3.5–5.1)
RBC # BLD AUTO: 3.24 X10(6)/MCL (ref 4.2–5.4)
SODIUM SERPL-SCNC: 142 MMOL/L (ref 136–145)
WBC # SPEC AUTO: 7 X10(3)/MCL (ref 4.5–11.5)

## 2021-03-31 ENCOUNTER — HISTORICAL (OUTPATIENT)
Dept: ADMINISTRATIVE | Facility: HOSPITAL | Age: 61
End: 2021-03-31

## 2021-04-07 ENCOUNTER — HISTORICAL (OUTPATIENT)
Dept: ADMINISTRATIVE | Facility: HOSPITAL | Age: 61
End: 2021-04-07

## 2021-04-20 ENCOUNTER — HISTORICAL (OUTPATIENT)
Dept: ADMINISTRATIVE | Facility: HOSPITAL | Age: 61
End: 2021-04-20

## 2021-04-28 ENCOUNTER — HISTORICAL (OUTPATIENT)
Dept: ADMINISTRATIVE | Facility: HOSPITAL | Age: 61
End: 2021-04-28

## 2021-06-01 ENCOUNTER — HISTORICAL (OUTPATIENT)
Dept: ADMINISTRATIVE | Facility: HOSPITAL | Age: 61
End: 2021-06-01

## 2021-06-22 ENCOUNTER — HISTORICAL (OUTPATIENT)
Dept: ADMINISTRATIVE | Facility: HOSPITAL | Age: 61
End: 2021-06-22

## 2021-08-05 ENCOUNTER — HISTORICAL (OUTPATIENT)
Dept: ADMINISTRATIVE | Facility: HOSPITAL | Age: 61
End: 2021-08-05

## 2021-08-20 ENCOUNTER — HISTORICAL (OUTPATIENT)
Dept: ADMINISTRATIVE | Facility: HOSPITAL | Age: 61
End: 2021-08-20

## 2021-08-20 LAB
ABS NEUT (OLG): 9.59 X10(3)/MCL (ref 2.1–9.2)
ALBUMIN SERPL-MCNC: 3.6 GM/DL (ref 3.4–4.8)
ALBUMIN/GLOB SERPL: 0.9 RATIO (ref 1.1–2)
ALP SERPL-CCNC: 114 UNIT/L (ref 40–150)
ALT SERPL-CCNC: 23 UNIT/L (ref 0–55)
AST SERPL-CCNC: 37 UNIT/L (ref 5–34)
BASOPHILS # BLD AUTO: 0.1 X10(3)/MCL (ref 0–0.2)
BASOPHILS NFR BLD AUTO: 1 %
BILIRUB SERPL-MCNC: 0.3 MG/DL
BILIRUBIN DIRECT+TOT PNL SERPL-MCNC: 0.1 MG/DL (ref 0–0.8)
BILIRUBIN DIRECT+TOT PNL SERPL-MCNC: 0.2 MG/DL (ref 0–0.5)
BUN SERPL-MCNC: 5.5 MG/DL (ref 9.8–20.1)
CALCIUM SERPL-MCNC: 9.5 MG/DL (ref 8.4–10.2)
CHLORIDE SERPL-SCNC: 105 MMOL/L (ref 98–107)
CO2 SERPL-SCNC: 24 MMOL/L (ref 23–31)
CREAT SERPL-MCNC: 0.74 MG/DL (ref 0.55–1.02)
EOSINOPHIL # BLD AUTO: 0.1 X10(3)/MCL (ref 0–0.9)
EOSINOPHIL NFR BLD AUTO: 1 %
ERYTHROCYTE [DISTWIDTH] IN BLOOD BY AUTOMATED COUNT: 16.6 % (ref 11.5–14.5)
EST CREAT CLEARANCE SER (OHS): 74.94 ML/MIN
GLOBULIN SER-MCNC: 3.9 GM/DL (ref 2.4–3.5)
GLUCOSE SERPL-MCNC: 108 MG/DL (ref 82–115)
HCT VFR BLD AUTO: 38.4 % (ref 35–46)
HGB BLD-MCNC: 11.8 GM/DL (ref 12–16)
IMM GRANULOCYTES # BLD AUTO: 0.04 10*3/UL
IMM GRANULOCYTES NFR BLD AUTO: 0 %
LYMPHOCYTES # BLD AUTO: 2.1 X10(3)/MCL (ref 0.6–4.6)
LYMPHOCYTES NFR BLD AUTO: 16 %
MCH RBC QN AUTO: 26.3 PG (ref 26–34)
MCHC RBC AUTO-ENTMCNC: 30.7 GM/DL (ref 31–37)
MCV RBC AUTO: 85.7 FL (ref 80–100)
MONOCYTES # BLD AUTO: 1.1 X10(3)/MCL (ref 0.1–1.3)
MONOCYTES NFR BLD AUTO: 8 %
NEUTROPHILS # BLD AUTO: 9.59 X10(3)/MCL (ref 2.1–9.2)
NEUTROPHILS NFR BLD AUTO: 74 %
NRBC BLD AUTO-RTO: 0 % (ref 0–0.2)
PLATELET # BLD AUTO: 296 X10(3)/MCL (ref 130–400)
PMV BLD AUTO: 9.4 FL (ref 7.4–10.4)
POTASSIUM SERPL-SCNC: 4.5 MMOL/L (ref 3.5–5.1)
PROT SERPL-MCNC: 7.5 GM/DL (ref 5.8–7.6)
RBC # BLD AUTO: 4.48 X10(6)/MCL (ref 4–5.2)
SODIUM SERPL-SCNC: 140 MMOL/L (ref 136–145)
WBC # SPEC AUTO: 13 X10(3)/MCL (ref 4.5–11)

## 2021-09-07 ENCOUNTER — HISTORICAL (OUTPATIENT)
Dept: ADMINISTRATIVE | Facility: HOSPITAL | Age: 61
End: 2021-09-07

## 2022-04-10 ENCOUNTER — HISTORICAL (OUTPATIENT)
Dept: ADMINISTRATIVE | Facility: HOSPITAL | Age: 62
End: 2022-04-10
Payer: MEDICARE

## 2022-04-26 VITALS
HEIGHT: 66 IN | WEIGHT: 129.88 LBS | DIASTOLIC BLOOD PRESSURE: 79 MMHG | SYSTOLIC BLOOD PRESSURE: 114 MMHG | OXYGEN SATURATION: 98 % | BODY MASS INDEX: 20.87 KG/M2

## 2022-04-30 NOTE — OP NOTE
Patient:   Lauren Ring            MRN: [id number]            FIN: [id number]               Age:   60 years     Sex:  Female     :  1960   Associated Diagnoses:   None   Author:   Yimi Robison MD      Preoperative Diagnosis: Cataract Left eye    Postoperative Diagnosis: Cataract Left eye    Procedure: Phacoemulsification with intaocular lens implantations Left eye    Surgeon: Yimi Robison MD    Assistant: Kavya Fuentes, [facility]    Anestheisa: MAC    Complications: None    The patient was brought into the operating suite, where the patient was correctly identified as was the operative eye via timeout.  The patient was prepped and draped in a sterile ophthalmic fashion.  A lid speculum was placed in the operative eye and the microscope was brought into place.  A 1.0mm paracentesis was then made at (6) o'clock.  The anterior chamber was filled with Endocoat.  A (temporal) clear corneal incision was made with a 2.4 mm keratome.  A 6 mm corneal marking ring was used to dariela the cornea centered over the visual axis.  A 5.00 mm continuous curvilinear capsulorhexis was fashioned using a cystotome and microcapsular forceps.  Hydrodissection and hydrodelineation was performed with upreserved 1% Xylocaine.  The nucleus was then phacoemulsified with the Abbott phacoemulsification hand-piece with a total of (22) EFX.  The cortex was then removed with the I/A hand-piece. The lens model (ZCB00) with a power of (22.0) was placed in the capsular bag.  The Helon was then removed from the eye with the I/A hand piece.  The anterior chamber was inflated and the wounds were hydrated with BSS.  The wounds were checked with Weck-Wendy sponges and found to be watertight.  The lid speculum was removed and topical antibiotics were placed on the operative eye.  The patient was brought to PACU in good condition.

## 2022-04-30 NOTE — OP NOTE
Patient:   Lauren Ring            MRN: [id number]            FIN: [id number]               Age:   60 years     Sex:  Female     :  1960   Associated Diagnoses:   None   Author:   Yimi Robison MD      Preoperative Diagnosis: Cataract Right eye    Postoperative Diagnosis: Cataract Right eye    Procedure: Phacoemulsification with intaocular lens implantations Right eye    Surgeon: Yimi Robison MD    Assistant: Kavya Fuentes, [facility]    Anestheisa: MAC    Complications: None    The patient was brought into the operating suite, where the patient was correctly identified as was the operative eye via timeout.  The patient was prepped and draped in a sterile ophthalmic fashion.  A lid speculum was placed in the operative eye and the microscope was brought into place.  A 1.0mm paracentesis was then made at (12) o'clock.  The anterior chamber was filled with Endocoat.  A (temporal) clear corneal incision was made with a 2.4 mm keratome.  A 6 mm corneal marking ring was used to dariela the cornea centered over the visual axis.  A 5.00 mm continuous curvilinear capsulorhexis was fashioned using a cystotome and microcapsular forceps.  Hydrodissection and hydrodelineation was performed with upreserved 1% Xylocaine.  The nucleus was then phacoemulsified with the Abbott phacoemulsification hand-piece with a total of (11) EFX.  The cortex was then removed with the I/A hand-piece. The lens model (ZCB00) with a power of (23.5) was placed in the capsular bag.  The Helon was then removed from the eye with the I/A hand piece.  The anterior chamber was inflated and the wounds were hydrated with BSS.  The wounds were checked with Weck-Wendy sponges and found to be watertight.  The lid speculum was removed and topical antibiotics were placed on the operative eye.  The patient was brought to PACU in good condition.

## 2022-07-25 ENCOUNTER — HOSPITAL ENCOUNTER (OUTPATIENT)
Dept: RADIOLOGY | Facility: CLINIC | Age: 62
Discharge: HOME | End: 2022-07-25
Attending: ORTHOPAEDIC SURGERY
Payer: MEDICARE

## 2022-07-25 ENCOUNTER — OFFICE VISIT (OUTPATIENT)
Dept: ORTHOPEDICS | Facility: CLINIC | Age: 62
End: 2022-07-25
Payer: MEDICARE

## 2022-07-25 VITALS — HEIGHT: 65 IN | BODY MASS INDEX: 21.49 KG/M2 | WEIGHT: 129 LBS

## 2022-07-25 DIAGNOSIS — S82.874D CLOSED NONDISPLACED PILON FRACTURE OF RIGHT TIBIA WITH ROUTINE HEALING, SUBSEQUENT ENCOUNTER: Primary | ICD-10-CM

## 2022-07-25 DIAGNOSIS — S82.874D CLOSED NONDISPLACED PILON FRACTURE OF RIGHT TIBIA WITH ROUTINE HEALING, SUBSEQUENT ENCOUNTER: ICD-10-CM

## 2022-07-25 PROCEDURE — 99213 OFFICE O/P EST LOW 20 MIN: CPT | Mod: ,,, | Performed by: ORTHOPAEDIC SURGERY

## 2022-07-25 PROCEDURE — 99213 PR OFFICE/OUTPT VISIT, EST, LEVL III, 20-29 MIN: ICD-10-PCS | Mod: ,,, | Performed by: ORTHOPAEDIC SURGERY

## 2022-07-25 PROCEDURE — 73610 XR ANKLE COMPLETE 3 VIEW RIGHT: ICD-10-PCS | Mod: RT,,, | Performed by: ORTHOPAEDIC SURGERY

## 2022-07-25 PROCEDURE — 73610 X-RAY EXAM OF ANKLE: CPT | Mod: RT,,, | Performed by: ORTHOPAEDIC SURGERY

## 2022-07-25 RX ORDER — HYDROXYZINE PAMOATE 25 MG/1
25 CAPSULE ORAL
COMMUNITY
Start: 2022-01-11 | End: 2022-07-27 | Stop reason: ALTCHOICE

## 2022-07-25 RX ORDER — DIVALPROEX SODIUM 500 MG/1
1000 TABLET, DELAYED RELEASE ORAL
COMMUNITY
Start: 2022-01-11 | End: 2022-07-27 | Stop reason: ALTCHOICE

## 2022-07-25 RX ORDER — FLUOXETINE 10 MG/1
10 CAPSULE ORAL
COMMUNITY
Start: 2022-01-11 | End: 2022-07-27 | Stop reason: ALTCHOICE

## 2022-07-25 RX ORDER — CLINDAMYCIN HYDROCHLORIDE 300 MG/1
300 CAPSULE ORAL 3 TIMES DAILY
COMMUNITY
Start: 2022-07-05 | End: 2022-07-27 | Stop reason: ALTCHOICE

## 2022-07-25 RX ORDER — IBUPROFEN 800 MG/1
800 TABLET ORAL EVERY 8 HOURS
COMMUNITY
Start: 2022-07-05 | End: 2022-07-27 | Stop reason: CLARIF

## 2022-07-25 RX ORDER — DOXYCYCLINE 100 MG/1
100 CAPSULE ORAL
COMMUNITY
Start: 2022-01-11 | End: 2022-07-27 | Stop reason: ALTCHOICE

## 2022-07-25 RX ORDER — SODIUM CHLORIDE 0.9 % (FLUSH) 0.9 %
10 SYRINGE (ML) INJECTION
Status: DISCONTINUED | OUTPATIENT
Start: 2022-07-25 | End: 2022-08-02 | Stop reason: HOSPADM

## 2022-07-25 RX ORDER — CLOPIDOGREL BISULFATE 75 MG/1
75 TABLET ORAL DAILY
Status: ON HOLD | COMMUNITY
Start: 2022-05-09 | End: 2022-07-28

## 2022-07-25 RX ORDER — ISOSORBIDE MONONITRATE 30 MG/1
30 TABLET, EXTENDED RELEASE ORAL DAILY
Status: ON HOLD | COMMUNITY
Start: 2022-05-09 | End: 2022-07-28

## 2022-07-25 RX ORDER — MUPIROCIN 20 MG/G
OINTMENT TOPICAL
Status: CANCELLED | OUTPATIENT
Start: 2022-07-25

## 2022-07-25 NOTE — H&P (VIEW-ONLY)
"  Subjective:       Patient ID: Lauren Ring is a 62 y.o. female.  Chief Complaint   Patient presents with    Follow-up     16 MOS ORIF RIGHT ANKLE 03/23/21, SWELLING, PURPLE, STARTED 7/4/22 HAD FLUID COME OUT WENT TO URGENT CARE, AMBULATING WHEELCHAIR,         [facility]     Patient presents for follow up ORIF right ankle 03/23/2021 with Dr. Young. States she has had drainage from this incision off and on over the past few months. Has seen us once for skin injury in the past. She went to urgent care approx a week ago and was placed on clindamycin and ibuprofen 800 mg for pain. He has been unable to ambulate over a generalized period of time but has worsened over the past few weeks which prompted her to reach out for this visit. She denies any fevers or chills, does have an area of blistering to the medial ankle.    ROS:  Constitutional: Denies fever chills  Eyes: No change in vision  ENT: No ringing or current infections  CV: No chest pain  Resp: No labored breathing  MSK: Pain evident at site of injury located in HPI,   Integ: No signs of abrasions or lacerations  Neuro: No numbness or tingling  Lymphatic: No swelling outside the area of injury     Current Outpatient Medications on File Prior to Visit   Medication Sig Dispense Refill    clindamycin (CLEOCIN) 300 MG capsule Take 300 mg by mouth 3 (three) times daily.      clopidogreL (PLAVIX) 75 mg tablet Take 75 mg by mouth once daily.      divalproex (DEPAKOTE) 500 MG TbEC Take 1,000 mg by mouth.      doxycycline (VIBRAMYCIN) 100 MG Cap Take 100 mg by mouth.      FLUoxetine 10 MG capsule Take 10 mg by mouth.      hydrOXYzine pamoate (VISTARIL) 25 MG Cap Take 25 mg by mouth.      ibuprofen (ADVIL,MOTRIN) 800 MG tablet Take 800 mg by mouth every 8 (eight) hours.      isosorbide mononitrate (IMDUR) 30 MG 24 hr tablet Take 30 mg by mouth once daily.       No current facility-administered medications on file prior to visit.          Objective:      Ht 5' 5" (1.651 m) "   Wt 58.5 kg (129 lb)   BMI 21.47 kg/m²   Physical Exam  General the patient is alert and oriented x3 no acute distress nontoxic-appearing appropriate affect.    Constitutional: Vital signs are examined and stable.  Resp: No signs of labored breathing    Musculoskeletal:   Right lower extremity: medial incision site with small areas of non-healing area which has been draining, dry today upon exam, proximal incision with hemorrhagic blistered area with surrounding erythema; compartments are soft and compressible; moderate tenderness to palpation; dorsi/plantar flexes the foot; SILT distally; BCR distally; DP pulse palpable        Body mass index is 21.47 kg/m².  Ideal body weight: 57 kg (125 lb 10.6 oz)  Adjusted ideal body weight: 57.6 kg (127 lb)  No results found for: HGBA1C  Hgb   Date Value Ref Range Status   08/20/2021 11.8 (L) 12.0 - 16.0 gm/dL Final   03/25/2021 9.2 (L) 12.0 - 16.0 gm/dL Final     Hct   Date Value Ref Range Status   08/20/2021 38.4 35.0 - 46.0 % Final   03/25/2021 31.3 (L) 37.0 - 47.0 % Final     Iron Level   Date Value Ref Range Status   04/10/2019 246 (H) 50 - 170 mcg/dL Final     No components found for: FROLATE  Vit D 25 OH   Date Value Ref Range Status   03/24/2021 41.5 30.0 - 80.0 ng/mL Final   06/27/2019 43.25 30.00 - 80.00 ng/mL Final     WBC   Date Value Ref Range Status   08/20/2021 13.0 (H) 4.5 - 11.0 x10(3)/mcL Final   03/25/2021 7.0 4.5 - 11.5 x10(3)/mcL Final       Radiology: x-rays of the right ankle reveal hardware intact without loosening or failure. Consolidation of fracture and union consistent with routine healing. Small area of soft tissue swelling to the medial tibia noted on films.         Assessment:         1. Closed nondisplaced pilon fracture of right tibia with routine healing, subsequent encounter  X-Ray Ankle Complete Right    Case Request Operating Room: INCISION AND DRAINAGE, LOWER EXTREMITY    C-Reactive Protein    Sedimentation rate           Plan:          Follow up if symptoms worsen or fail to improve, for surgery on Thursday 07/27/2022.    Lauren was seen today for follow-up.    Diagnoses and all orders for this visit:    Closed nondisplaced pilon fracture of right tibia with routine healing, subsequent encounter  -     X-Ray Ankle Complete Right; Future  -     Case Request Operating Room: INCISION AND DRAINAGE, LOWER EXTREMITY  -     C-Reactive Protein; Future  -     Sedimentation rate; Future    Other orders  -     Vital signs; Standing  -     Cleanse with Chlorhexidine (CHG); Standing  -     Diet NPO; Standing  -     sodium chloride 0.9% flush 10 mL  -     IP VTE LOW RISK PATIENT; Standing  -     Place GABRIELLE hose; Standing  -     Place sequential compression device; Standing  -     Chlorohexidine Gluconate Bath; Standing  -     mupirocin 2 % ointment  -     Full code; Standing  -     Place in Outpatient; Standing  -     EKG 12-lead; Standing  -     X-Ray Chest PA And Lateral; Standing  -     Basic metabolic panel; Standing  -     CBC auto differential; Standing  -     Type & Screen; Standing  -     Protime-INR; Standing  -     Cleanse with Chlorhexidine (CHG)  -     Diet NPO  -     IP VTE LOW RISK PATIENT  -     Place GABRIELLE hose  -     Place sequential compression device  -     Full code  -     Type & Screen        - Patient may weight bear as tolerated until surgery. Continue clindamycin as she is currently.   -Discussed nicotine use and risk of decreased healing with continued use.   - If drainage occurs prior to surgery, cover with soft dressing and change daily.   -Plan for I&D of the right ankle with hardware removal on Thursday 07/27; NPO after midnight.   -Plan for WBAT in CAM boot following surgery. Possible need for long term IV abx following intra-operative evaluation.  -ED precautions given    The above findings, diagnostics, and treatment plan were discussed with Dr. Hassan who is in agreement with the plan of care except as stated in additional  documentation.       Alexandra Blair Lemaire, PA-C [facility]   Orthopedic Trauma              No future appointments.

## 2022-07-25 NOTE — PROGRESS NOTES
"  Subjective:       Patient ID: Lauren Ring is a 62 y.o. female.  Chief Complaint   Patient presents with    Follow-up     16 MOS ORIF RIGHT ANKLE 03/23/21, SWELLING, PURPLE, STARTED 7/4/22 HAD FLUID COME OUT WENT TO URGENT CARE, AMBULATING WHEELCHAIR,         [facility]     Patient presents for follow up ORIF right ankle 03/23/2021 with Dr. Young. States she has had drainage from this incision off and on over the past few months. Has seen us once for skin injury in the past. She went to urgent care approx a week ago and was placed on clindamycin and ibuprofen 800 mg for pain. He has been unable to ambulate over a generalized period of time but has worsened over the past few weeks which prompted her to reach out for this visit. She denies any fevers or chills, does have an area of blistering to the medial ankle.    ROS:  Constitutional: Denies fever chills  Eyes: No change in vision  ENT: No ringing or current infections  CV: No chest pain  Resp: No labored breathing  MSK: Pain evident at site of injury located in HPI,   Integ: No signs of abrasions or lacerations  Neuro: No numbness or tingling  Lymphatic: No swelling outside the area of injury     Current Outpatient Medications on File Prior to Visit   Medication Sig Dispense Refill    clindamycin (CLEOCIN) 300 MG capsule Take 300 mg by mouth 3 (three) times daily.      clopidogreL (PLAVIX) 75 mg tablet Take 75 mg by mouth once daily.      divalproex (DEPAKOTE) 500 MG TbEC Take 1,000 mg by mouth.      doxycycline (VIBRAMYCIN) 100 MG Cap Take 100 mg by mouth.      FLUoxetine 10 MG capsule Take 10 mg by mouth.      hydrOXYzine pamoate (VISTARIL) 25 MG Cap Take 25 mg by mouth.      ibuprofen (ADVIL,MOTRIN) 800 MG tablet Take 800 mg by mouth every 8 (eight) hours.      isosorbide mononitrate (IMDUR) 30 MG 24 hr tablet Take 30 mg by mouth once daily.       No current facility-administered medications on file prior to visit.          Objective:      Ht 5' 5" (1.651 m) "   Wt 58.5 kg (129 lb)   BMI 21.47 kg/m²   Physical Exam  General the patient is alert and oriented x3 no acute distress nontoxic-appearing appropriate affect.    Constitutional: Vital signs are examined and stable.  Resp: No signs of labored breathing    Musculoskeletal:   Right lower extremity: medial incision site with small areas of non-healing area which has been draining, dry today upon exam, proximal incision with hemorrhagic blistered area with surrounding erythema; compartments are soft and compressible; moderate tenderness to palpation; dorsi/plantar flexes the foot; SILT distally; BCR distally; DP pulse palpable        Body mass index is 21.47 kg/m².  Ideal body weight: 57 kg (125 lb 10.6 oz)  Adjusted ideal body weight: 57.6 kg (127 lb)  No results found for: HGBA1C  Hgb   Date Value Ref Range Status   08/20/2021 11.8 (L) 12.0 - 16.0 gm/dL Final   03/25/2021 9.2 (L) 12.0 - 16.0 gm/dL Final     Hct   Date Value Ref Range Status   08/20/2021 38.4 35.0 - 46.0 % Final   03/25/2021 31.3 (L) 37.0 - 47.0 % Final     Iron Level   Date Value Ref Range Status   04/10/2019 246 (H) 50 - 170 mcg/dL Final     No components found for: FROLATE  Vit D 25 OH   Date Value Ref Range Status   03/24/2021 41.5 30.0 - 80.0 ng/mL Final   06/27/2019 43.25 30.00 - 80.00 ng/mL Final     WBC   Date Value Ref Range Status   08/20/2021 13.0 (H) 4.5 - 11.0 x10(3)/mcL Final   03/25/2021 7.0 4.5 - 11.5 x10(3)/mcL Final       Radiology: x-rays of the right ankle reveal hardware intact without loosening or failure. Consolidation of fracture and union consistent with routine healing. Small area of soft tissue swelling to the medial tibia noted on films.         Assessment:         1. Closed nondisplaced pilon fracture of right tibia with routine healing, subsequent encounter  X-Ray Ankle Complete Right    Case Request Operating Room: INCISION AND DRAINAGE, LOWER EXTREMITY    C-Reactive Protein    Sedimentation rate           Plan:          Follow up if symptoms worsen or fail to improve, for surgery on Thursday 07/27/2022.    Lauren was seen today for follow-up.    Diagnoses and all orders for this visit:    Closed nondisplaced pilon fracture of right tibia with routine healing, subsequent encounter  -     X-Ray Ankle Complete Right; Future  -     Case Request Operating Room: INCISION AND DRAINAGE, LOWER EXTREMITY  -     C-Reactive Protein; Future  -     Sedimentation rate; Future    Other orders  -     Vital signs; Standing  -     Cleanse with Chlorhexidine (CHG); Standing  -     Diet NPO; Standing  -     sodium chloride 0.9% flush 10 mL  -     IP VTE LOW RISK PATIENT; Standing  -     Place GABRIELLE hose; Standing  -     Place sequential compression device; Standing  -     Chlorohexidine Gluconate Bath; Standing  -     mupirocin 2 % ointment  -     Full code; Standing  -     Place in Outpatient; Standing  -     EKG 12-lead; Standing  -     X-Ray Chest PA And Lateral; Standing  -     Basic metabolic panel; Standing  -     CBC auto differential; Standing  -     Type & Screen; Standing  -     Protime-INR; Standing  -     Cleanse with Chlorhexidine (CHG)  -     Diet NPO  -     IP VTE LOW RISK PATIENT  -     Place GABRIELLE hose  -     Place sequential compression device  -     Full code  -     Type & Screen        - Patient may weight bear as tolerated until surgery. Continue clindamycin as she is currently.   -Discussed nicotine use and risk of decreased healing with continued use.   - If drainage occurs prior to surgery, cover with soft dressing and change daily.   -Plan for I&D of the right ankle with hardware removal on Thursday 07/27; NPO after midnight.   -Plan for WBAT in CAM boot following surgery. Possible need for long term IV abx following intra-operative evaluation.  -ED precautions given    The above findings, diagnostics, and treatment plan were discussed with Dr. Hassan who is in agreement with the plan of care except as stated in additional  documentation.       Alexandra Blair Lemaire, PA-C [facility]   Orthopedic Trauma              No future appointments.

## 2022-07-27 ENCOUNTER — ANESTHESIA EVENT (OUTPATIENT)
Dept: SURGERY | Facility: HOSPITAL | Age: 62
End: 2022-07-27
Payer: MEDICARE

## 2022-07-27 RX ORDER — IPRATROPIUM BROMIDE AND ALBUTEROL SULFATE 2.5; .5 MG/3ML; MG/3ML
3 SOLUTION RESPIRATORY (INHALATION) 3 TIMES DAILY PRN
COMMUNITY
Start: 2022-07-05 | End: 2023-07-27 | Stop reason: SDUPTHER

## 2022-07-27 RX ORDER — BUDESONIDE AND FORMOTEROL FUMARATE DIHYDRATE 160; 4.5 UG/1; UG/1
1 AEROSOL RESPIRATORY (INHALATION) DAILY
COMMUNITY
Start: 2022-04-25 | End: 2023-01-26 | Stop reason: SDUPTHER

## 2022-07-27 RX ORDER — ALPRAZOLAM 0.25 MG/1
0.25 TABLET ORAL 2 TIMES DAILY PRN
COMMUNITY
Start: 2022-06-24 | End: 2022-10-04

## 2022-07-27 RX ORDER — NAPROXEN SODIUM 220 MG
220 TABLET ORAL 2 TIMES DAILY PRN
Status: ON HOLD | COMMUNITY
End: 2022-07-28

## 2022-07-27 RX ORDER — TRAZODONE HYDROCHLORIDE 50 MG/1
1 TABLET ORAL NIGHTLY
COMMUNITY
Start: 2022-07-25 | End: 2022-10-04

## 2022-07-27 RX ORDER — ALBUTEROL SULFATE 90 UG/1
2 AEROSOL, METERED RESPIRATORY (INHALATION) EVERY 6 HOURS PRN
COMMUNITY
Start: 2022-06-29 | End: 2023-01-26 | Stop reason: SDUPTHER

## 2022-07-27 RX ORDER — ASPIRIN 81 MG/1
81 TABLET ORAL DAILY
COMMUNITY

## 2022-07-28 ENCOUNTER — ANESTHESIA (OUTPATIENT)
Dept: SURGERY | Facility: HOSPITAL | Age: 62
End: 2022-07-28
Payer: MEDICARE

## 2022-07-28 ENCOUNTER — HOSPITAL ENCOUNTER (OUTPATIENT)
Facility: HOSPITAL | Age: 62
LOS: 1 days | Discharge: HOME | End: 2022-08-02
Attending: ORTHOPAEDIC SURGERY | Admitting: ORTHOPAEDIC SURGERY
Payer: MEDICARE

## 2022-07-28 DIAGNOSIS — S82.874D CLOSED NONDISPLACED PILON FRACTURE OF RIGHT TIBIA WITH ROUTINE HEALING, SUBSEQUENT ENCOUNTER: ICD-10-CM

## 2022-07-28 DIAGNOSIS — Z01.811 PRE-OP CHEST EXAM: ICD-10-CM

## 2022-07-28 LAB
ANION GAP SERPL CALC-SCNC: 9 MEQ/L
BASOPHILS # BLD AUTO: 0.11 X10(3)/MCL (ref 0–0.2)
BASOPHILS NFR BLD AUTO: 0.9 %
BUN SERPL-MCNC: 8.7 MG/DL (ref 9.8–20.1)
CALCIUM SERPL-MCNC: 9.3 MG/DL (ref 8.4–10.2)
CHLORIDE SERPL-SCNC: 111 MMOL/L (ref 98–107)
CO2 SERPL-SCNC: 19 MMOL/L (ref 23–31)
CREAT SERPL-MCNC: 0.71 MG/DL (ref 0.55–1.02)
CREAT/UREA NIT SERPL: 12
EOSINOPHIL # BLD AUTO: 0.19 X10(3)/MCL (ref 0–0.9)
EOSINOPHIL NFR BLD AUTO: 1.5 %
ERYTHROCYTE [DISTWIDTH] IN BLOOD BY AUTOMATED COUNT: 14.6 % (ref 11.5–17)
GLUCOSE SERPL-MCNC: 106 MG/DL (ref 82–115)
HCT VFR BLD AUTO: 42.2 % (ref 37–47)
HGB BLD-MCNC: 13 GM/DL (ref 12–16)
IMM GRANULOCYTES # BLD AUTO: 0.1 X10(3)/MCL (ref 0–0.04)
IMM GRANULOCYTES NFR BLD AUTO: 0.8 %
INR BLD: 1.01 (ref 0–1.3)
LYMPHOCYTES # BLD AUTO: 2.79 X10(3)/MCL (ref 0.6–4.6)
LYMPHOCYTES NFR BLD AUTO: 22.3 %
MCH RBC QN AUTO: 29 PG (ref 27–31)
MCHC RBC AUTO-ENTMCNC: 30.8 MG/DL (ref 33–36)
MCV RBC AUTO: 94.2 FL (ref 80–94)
MONOCYTES # BLD AUTO: 1.48 X10(3)/MCL (ref 0.1–1.3)
MONOCYTES NFR BLD AUTO: 11.8 %
NEUTROPHILS # BLD AUTO: 7.8 X10(3)/MCL (ref 2.1–9.2)
NEUTROPHILS NFR BLD AUTO: 62.7 %
NRBC BLD AUTO-RTO: 0 %
PLATELET # BLD AUTO: 232 X10(3)/MCL (ref 130–400)
PMV BLD AUTO: 10.5 FL (ref 7.4–10.4)
POTASSIUM SERPL-SCNC: 4.3 MMOL/L (ref 3.5–5.1)
PROTHROMBIN TIME: 13.2 SECONDS (ref 12.5–14.5)
RBC # BLD AUTO: 4.48 X10(6)/MCL (ref 4.2–5.4)
SODIUM SERPL-SCNC: 139 MMOL/L (ref 136–145)
WBC # SPEC AUTO: 12.5 X10(3)/MCL (ref 4.5–11.5)

## 2022-07-28 PROCEDURE — 96375 TX/PRO/DX INJ NEW DRUG ADDON: CPT | Mod: 59

## 2022-07-28 PROCEDURE — 94799 UNLISTED PULMONARY SVC/PX: CPT

## 2022-07-28 PROCEDURE — 96365 THER/PROPH/DIAG IV INF INIT: CPT

## 2022-07-28 PROCEDURE — 85025 COMPLETE CBC W/AUTO DIFF WBC: CPT | Performed by: ORTHOPAEDIC SURGERY

## 2022-07-28 PROCEDURE — 71000015 HC POSTOP RECOV 1ST HR: Performed by: ORTHOPAEDIC SURGERY

## 2022-07-28 PROCEDURE — 85610 PROTHROMBIN TIME: CPT | Performed by: ORTHOPAEDIC SURGERY

## 2022-07-28 PROCEDURE — 25000003 PHARM REV CODE 250: Performed by: PHYSICIAN ASSISTANT

## 2022-07-28 PROCEDURE — 63600175 PHARM REV CODE 636 W HCPCS: Performed by: ORTHOPAEDIC SURGERY

## 2022-07-28 PROCEDURE — 36000705 HC OR TIME LEV I EA ADD 15 MIN: Performed by: ORTHOPAEDIC SURGERY

## 2022-07-28 PROCEDURE — 27000221 HC OXYGEN, UP TO 24 HOURS

## 2022-07-28 PROCEDURE — 13160 SEC CLSR SURG WND/DEHSN XTN: CPT | Mod: RT,,, | Performed by: ORTHOPAEDIC SURGERY

## 2022-07-28 PROCEDURE — 36415 COLL VENOUS BLD VENIPUNCTURE: CPT | Performed by: ORTHOPAEDIC SURGERY

## 2022-07-28 PROCEDURE — 71000033 HC RECOVERY, INTIAL HOUR: Performed by: ORTHOPAEDIC SURGERY

## 2022-07-28 PROCEDURE — 37000009 HC ANESTHESIA EA ADD 15 MINS: Performed by: ORTHOPAEDIC SURGERY

## 2022-07-28 PROCEDURE — 63600175 PHARM REV CODE 636 W HCPCS

## 2022-07-28 PROCEDURE — 20680 PR REMOVAL DEEP IMPLANT: ICD-10-PCS | Mod: 51,,, | Performed by: ORTHOPAEDIC SURGERY

## 2022-07-28 PROCEDURE — 37000008 HC ANESTHESIA 1ST 15 MINUTES: Performed by: ORTHOPAEDIC SURGERY

## 2022-07-28 PROCEDURE — 93010 EKG 12-LEAD: ICD-10-PCS | Mod: ,,, | Performed by: INTERNAL MEDICINE

## 2022-07-28 PROCEDURE — C1769 GUIDE WIRE: HCPCS | Performed by: ORTHOPAEDIC SURGERY

## 2022-07-28 PROCEDURE — G0378 HOSPITAL OBSERVATION PER HR: HCPCS

## 2022-07-28 PROCEDURE — 96376 TX/PRO/DX INJ SAME DRUG ADON: CPT

## 2022-07-28 PROCEDURE — 13160 PR SECD CLOS SURG WND EXTEN/COMPLIC: ICD-10-PCS | Mod: RT,,, | Performed by: ORTHOPAEDIC SURGERY

## 2022-07-28 PROCEDURE — 36000704 HC OR TIME LEV I 1ST 15 MIN: Performed by: ORTHOPAEDIC SURGERY

## 2022-07-28 PROCEDURE — 71000039 HC RECOVERY, EACH ADD'L HOUR: Performed by: ORTHOPAEDIC SURGERY

## 2022-07-28 PROCEDURE — 93010 ELECTROCARDIOGRAM REPORT: CPT | Mod: ,,, | Performed by: INTERNAL MEDICINE

## 2022-07-28 PROCEDURE — 96372 THER/PROPH/DIAG INJ SC/IM: CPT | Mod: 59 | Performed by: PHYSICIAN ASSISTANT

## 2022-07-28 PROCEDURE — 80048 BASIC METABOLIC PNL TOTAL CA: CPT | Performed by: ORTHOPAEDIC SURGERY

## 2022-07-28 PROCEDURE — 97162 PT EVAL MOD COMPLEX 30 MIN: CPT

## 2022-07-28 PROCEDURE — 25000003 PHARM REV CODE 250

## 2022-07-28 PROCEDURE — 63600175 PHARM REV CODE 636 W HCPCS: Performed by: STUDENT IN AN ORGANIZED HEALTH CARE EDUCATION/TRAINING PROGRAM

## 2022-07-28 PROCEDURE — 99900031 HC PATIENT EDUCATION (STAT)

## 2022-07-28 PROCEDURE — 63600175 PHARM REV CODE 636 W HCPCS: Performed by: PHYSICIAN ASSISTANT

## 2022-07-28 PROCEDURE — 93005 ELECTROCARDIOGRAM TRACING: CPT

## 2022-07-28 PROCEDURE — 20680 REMOVAL OF IMPLANT DEEP: CPT | Mod: 51,,, | Performed by: ORTHOPAEDIC SURGERY

## 2022-07-28 PROCEDURE — C1713 ANCHOR/SCREW BN/BN,TIS/BN: HCPCS | Performed by: ORTHOPAEDIC SURGERY

## 2022-07-28 DEVICE — KIT STIMULAN RAPID CURE 5CC: Type: IMPLANTABLE DEVICE | Site: LEG | Status: FUNCTIONAL

## 2022-07-28 RX ORDER — METHOCARBAMOL 100 MG/ML
INJECTION, SOLUTION INTRAMUSCULAR; INTRAVENOUS
Status: COMPLETED
Start: 2022-07-28 | End: 2022-07-28

## 2022-07-28 RX ORDER — MIDAZOLAM HYDROCHLORIDE 1 MG/ML
INJECTION INTRAMUSCULAR; INTRAVENOUS
Status: DISCONTINUED | OUTPATIENT
Start: 2022-07-28 | End: 2022-07-28

## 2022-07-28 RX ORDER — CEFAZOLIN SODIUM 2 G/50ML
2 SOLUTION INTRAVENOUS
Status: DISCONTINUED | OUTPATIENT
Start: 2022-07-28 | End: 2022-08-02 | Stop reason: HOSPADM

## 2022-07-28 RX ORDER — ASPIRIN 81 MG/1
81 TABLET ORAL DAILY
Status: DISCONTINUED | OUTPATIENT
Start: 2022-07-28 | End: 2022-08-02 | Stop reason: HOSPADM

## 2022-07-28 RX ORDER — KETOROLAC TROMETHAMINE 30 MG/ML
INJECTION, SOLUTION INTRAMUSCULAR; INTRAVENOUS
Status: DISCONTINUED | OUTPATIENT
Start: 2022-07-28 | End: 2022-07-28

## 2022-07-28 RX ORDER — FAMOTIDINE 20 MG/1
20 TABLET, FILM COATED ORAL DAILY PRN
Status: DISCONTINUED | OUTPATIENT
Start: 2022-07-28 | End: 2022-08-02 | Stop reason: HOSPADM

## 2022-07-28 RX ORDER — CEFAZOLIN SODIUM 2 G/50ML
2 SOLUTION INTRAVENOUS ONCE
Status: COMPLETED | OUTPATIENT
Start: 2022-07-28 | End: 2022-07-28

## 2022-07-28 RX ORDER — ACETAMINOPHEN 10 MG/ML
INJECTION, SOLUTION INTRAVENOUS
Status: DISCONTINUED | OUTPATIENT
Start: 2022-07-28 | End: 2022-07-28

## 2022-07-28 RX ORDER — ONDANSETRON 2 MG/ML
4 INJECTION INTRAMUSCULAR; INTRAVENOUS EVERY 6 HOURS PRN
Status: DISCONTINUED | OUTPATIENT
Start: 2022-07-28 | End: 2022-08-02 | Stop reason: HOSPADM

## 2022-07-28 RX ORDER — TOBRAMYCIN 40 MG/ML
INJECTION INTRAMUSCULAR; INTRAVENOUS
Status: DISCONTINUED
Start: 2022-07-28 | End: 2022-07-28 | Stop reason: WASHOUT

## 2022-07-28 RX ORDER — OXYCODONE HYDROCHLORIDE 5 MG/1
10 TABLET ORAL EVERY 4 HOURS PRN
Status: DISCONTINUED | OUTPATIENT
Start: 2022-07-28 | End: 2022-08-02 | Stop reason: HOSPADM

## 2022-07-28 RX ORDER — FLUTICASONE FUROATE AND VILANTEROL 100; 25 UG/1; UG/1
1 POWDER RESPIRATORY (INHALATION) DAILY
Status: DISCONTINUED | OUTPATIENT
Start: 2022-07-28 | End: 2022-08-02 | Stop reason: HOSPADM

## 2022-07-28 RX ORDER — METHOCARBAMOL 750 MG/1
750 TABLET, FILM COATED ORAL 3 TIMES DAILY
Status: DISCONTINUED | OUTPATIENT
Start: 2022-07-28 | End: 2022-08-02 | Stop reason: HOSPADM

## 2022-07-28 RX ORDER — SODIUM CHLORIDE 0.9 % (FLUSH) 0.9 %
10 SYRINGE (ML) INJECTION
Status: DISCONTINUED | OUTPATIENT
Start: 2022-07-28 | End: 2022-08-02 | Stop reason: HOSPADM

## 2022-07-28 RX ORDER — ALBUTEROL SULFATE 90 UG/1
2 AEROSOL, METERED RESPIRATORY (INHALATION) EVERY 6 HOURS PRN
Status: DISCONTINUED | OUTPATIENT
Start: 2022-07-28 | End: 2022-08-02 | Stop reason: HOSPADM

## 2022-07-28 RX ORDER — ADHESIVE BANDAGE
30 BANDAGE TOPICAL DAILY PRN
Status: DISCONTINUED | OUTPATIENT
Start: 2022-07-28 | End: 2022-08-02 | Stop reason: HOSPADM

## 2022-07-28 RX ORDER — BISACODYL 10 MG
10 SUPPOSITORY, RECTAL RECTAL DAILY PRN
Status: DISCONTINUED | OUTPATIENT
Start: 2022-07-28 | End: 2022-08-02 | Stop reason: HOSPADM

## 2022-07-28 RX ORDER — ROCURONIUM BROMIDE 10 MG/ML
INJECTION, SOLUTION INTRAVENOUS
Status: DISCONTINUED | OUTPATIENT
Start: 2022-07-28 | End: 2022-07-28

## 2022-07-28 RX ORDER — SENNOSIDES 8.6 MG/1
8.6 TABLET ORAL DAILY PRN
Status: DISCONTINUED | OUTPATIENT
Start: 2022-07-28 | End: 2022-08-02 | Stop reason: HOSPADM

## 2022-07-28 RX ORDER — OXYCODONE HYDROCHLORIDE 5 MG/1
5 TABLET ORAL EVERY 4 HOURS PRN
Status: DISCONTINUED | OUTPATIENT
Start: 2022-07-28 | End: 2022-08-02 | Stop reason: HOSPADM

## 2022-07-28 RX ORDER — MORPHINE SULFATE 10 MG/ML
4 INJECTION INTRAMUSCULAR; INTRAVENOUS; SUBCUTANEOUS EVERY 6 HOURS PRN
Status: DISCONTINUED | OUTPATIENT
Start: 2022-07-28 | End: 2022-08-02 | Stop reason: HOSPADM

## 2022-07-28 RX ORDER — ONDANSETRON HYDROCHLORIDE 2 MG/ML
INJECTION, SOLUTION INTRAMUSCULAR; INTRAVENOUS
Status: DISCONTINUED | OUTPATIENT
Start: 2022-07-28 | End: 2022-07-28

## 2022-07-28 RX ORDER — HYDROMORPHONE HYDROCHLORIDE 2 MG/ML
0.2 INJECTION, SOLUTION INTRAMUSCULAR; INTRAVENOUS; SUBCUTANEOUS EVERY 5 MIN PRN
Status: DISCONTINUED | OUTPATIENT
Start: 2022-07-28 | End: 2022-07-28

## 2022-07-28 RX ORDER — FENTANYL CITRATE 50 UG/ML
INJECTION, SOLUTION INTRAMUSCULAR; INTRAVENOUS
Status: DISCONTINUED | OUTPATIENT
Start: 2022-07-28 | End: 2022-07-28

## 2022-07-28 RX ORDER — DOCUSATE SODIUM 100 MG/1
100 CAPSULE, LIQUID FILLED ORAL DAILY
Status: DISCONTINUED | OUTPATIENT
Start: 2022-07-28 | End: 2022-08-02 | Stop reason: HOSPADM

## 2022-07-28 RX ORDER — ONDANSETRON 4 MG/1
4 TABLET, ORALLY DISINTEGRATING ORAL EVERY 8 HOURS PRN
Status: DISCONTINUED | OUTPATIENT
Start: 2022-07-28 | End: 2022-08-02 | Stop reason: HOSPADM

## 2022-07-28 RX ORDER — PROPOFOL 10 MG/ML
VIAL (ML) INTRAVENOUS
Status: DISCONTINUED | OUTPATIENT
Start: 2022-07-28 | End: 2022-07-28

## 2022-07-28 RX ORDER — ENOXAPARIN SODIUM 100 MG/ML
40 INJECTION SUBCUTANEOUS EVERY 24 HOURS
Status: DISCONTINUED | OUTPATIENT
Start: 2022-07-28 | End: 2022-08-02 | Stop reason: HOSPADM

## 2022-07-28 RX ORDER — TRAZODONE HYDROCHLORIDE 50 MG/1
50 TABLET ORAL NIGHTLY
Status: DISCONTINUED | OUTPATIENT
Start: 2022-07-28 | End: 2022-08-02 | Stop reason: HOSPADM

## 2022-07-28 RX ORDER — IPRATROPIUM BROMIDE AND ALBUTEROL SULFATE 2.5; .5 MG/3ML; MG/3ML
3 SOLUTION RESPIRATORY (INHALATION) 3 TIMES DAILY PRN
Status: DISCONTINUED | OUTPATIENT
Start: 2022-07-28 | End: 2022-08-02 | Stop reason: HOSPADM

## 2022-07-28 RX ORDER — MUPIROCIN 20 MG/G
OINTMENT TOPICAL
Status: DISCONTINUED | OUTPATIENT
Start: 2022-07-28 | End: 2022-07-28

## 2022-07-28 RX ORDER — ACETAMINOPHEN 10 MG/ML
1000 INJECTION, SOLUTION INTRAVENOUS EVERY 8 HOURS
Status: COMPLETED | OUTPATIENT
Start: 2022-07-28 | End: 2022-07-29

## 2022-07-28 RX ORDER — METOCLOPRAMIDE HYDROCHLORIDE 5 MG/ML
10 INJECTION INTRAMUSCULAR; INTRAVENOUS EVERY 10 MIN PRN
Status: DISCONTINUED | OUTPATIENT
Start: 2022-07-28 | End: 2022-07-28

## 2022-07-28 RX ORDER — VANCOMYCIN HYDROCHLORIDE 1 G/20ML
INJECTION, POWDER, LYOPHILIZED, FOR SOLUTION INTRAVENOUS
Status: DISCONTINUED | OUTPATIENT
Start: 2022-07-28 | End: 2022-07-28 | Stop reason: HOSPADM

## 2022-07-28 RX ORDER — ISOSORBIDE MONONITRATE 30 MG/1
30 TABLET, EXTENDED RELEASE ORAL DAILY
Status: DISCONTINUED | OUTPATIENT
Start: 2022-07-28 | End: 2022-08-02 | Stop reason: HOSPADM

## 2022-07-28 RX ORDER — DEXAMETHASONE SODIUM PHOSPHATE 4 MG/ML
INJECTION, SOLUTION INTRA-ARTICULAR; INTRALESIONAL; INTRAMUSCULAR; INTRAVENOUS; SOFT TISSUE
Status: DISCONTINUED | OUTPATIENT
Start: 2022-07-28 | End: 2022-07-28

## 2022-07-28 RX ORDER — SODIUM CHLORIDE 0.9 % (FLUSH) 0.9 %
10 SYRINGE (ML) INJECTION
Status: DISCONTINUED | OUTPATIENT
Start: 2022-07-28 | End: 2022-07-28

## 2022-07-28 RX ORDER — ONDANSETRON 2 MG/ML
4 INJECTION INTRAMUSCULAR; INTRAVENOUS DAILY PRN
Status: DISCONTINUED | OUTPATIENT
Start: 2022-07-28 | End: 2022-08-02 | Stop reason: HOSPADM

## 2022-07-28 RX ADMIN — HYDROMORPHONE HYDROCHLORIDE 0.2 MG: 2 INJECTION, SOLUTION INTRAMUSCULAR; INTRAVENOUS; SUBCUTANEOUS at 09:07

## 2022-07-28 RX ADMIN — SUGAMMADEX 100 MG: 100 INJECTION, SOLUTION INTRAVENOUS at 08:07

## 2022-07-28 RX ADMIN — OXYCODONE 10 MG: 5 TABLET ORAL at 01:07

## 2022-07-28 RX ADMIN — ENOXAPARIN SODIUM 40 MG: 40 INJECTION SUBCUTANEOUS at 04:07

## 2022-07-28 RX ADMIN — FENTANYL CITRATE 50 MCG: 50 INJECTION, SOLUTION INTRAMUSCULAR; INTRAVENOUS at 07:07

## 2022-07-28 RX ADMIN — KETOROLAC TROMETHAMINE 30 MG: 30 INJECTION, SOLUTION INTRAMUSCULAR at 08:07

## 2022-07-28 RX ADMIN — SUGAMMADEX 50 MG: 100 INJECTION, SOLUTION INTRAVENOUS at 08:07

## 2022-07-28 RX ADMIN — ACETAMINOPHEN 1000 MG: 10 INJECTION, SOLUTION INTRAVENOUS at 07:07

## 2022-07-28 RX ADMIN — OXYCODONE 10 MG: 5 TABLET ORAL at 10:07

## 2022-07-28 RX ADMIN — FENTANYL CITRATE 50 MCG: 50 INJECTION, SOLUTION INTRAMUSCULAR; INTRAVENOUS at 08:07

## 2022-07-28 RX ADMIN — PROPOFOL 150 MG: 10 INJECTION, EMULSION INTRAVENOUS at 07:07

## 2022-07-28 RX ADMIN — ROCURONIUM BROMIDE 50 MG: 10 SOLUTION INTRAVENOUS at 07:07

## 2022-07-28 RX ADMIN — ACETAMINOPHEN 1000 MG: 10 INJECTION, SOLUTION INTRAVENOUS at 10:07

## 2022-07-28 RX ADMIN — METHOCARBAMOL 1000 MG: 100 INJECTION INTRAMUSCULAR; INTRAVENOUS at 09:07

## 2022-07-28 RX ADMIN — CEFAZOLIN SODIUM 2 G: 2 SOLUTION INTRAVENOUS at 02:07

## 2022-07-28 RX ADMIN — METHOCARBAMOL 750 MG: 750 TABLET ORAL at 01:07

## 2022-07-28 RX ADMIN — DEXAMETHASONE SODIUM PHOSPHATE 4 MG: 4 INJECTION, SOLUTION INTRA-ARTICULAR; INTRALESIONAL; INTRAMUSCULAR; INTRAVENOUS; SOFT TISSUE at 07:07

## 2022-07-28 RX ADMIN — ACETAMINOPHEN 1000 MG: 10 INJECTION, SOLUTION INTRAVENOUS at 01:07

## 2022-07-28 RX ADMIN — TRAZODONE HYDROCHLORIDE 50 MG: 50 TABLET ORAL at 10:07

## 2022-07-28 RX ADMIN — METHOCARBAMOL 750 MG: 750 TABLET ORAL at 10:07

## 2022-07-28 RX ADMIN — DOCUSATE SODIUM 100 MG: 50 CAPSULE, LIQUID FILLED ORAL at 12:07

## 2022-07-28 RX ADMIN — OXYCODONE 10 MG: 5 TABLET ORAL at 06:07

## 2022-07-28 RX ADMIN — ASPIRIN 81 MG: 81 TABLET, COATED ORAL at 12:07

## 2022-07-28 RX ADMIN — MIDAZOLAM 2 MG: 1 INJECTION INTRAMUSCULAR; INTRAVENOUS at 06:07

## 2022-07-28 RX ADMIN — HYDROMORPHONE HYDROCHLORIDE 0.2 MG: 2 INJECTION, SOLUTION INTRAMUSCULAR; INTRAVENOUS; SUBCUTANEOUS at 08:07

## 2022-07-28 RX ADMIN — ONDANSETRON 4 MG: 2 INJECTION INTRAMUSCULAR; INTRAVENOUS at 08:07

## 2022-07-28 RX ADMIN — CEFAZOLIN SODIUM 2 G: 2 SOLUTION INTRAVENOUS at 07:07

## 2022-07-28 RX ADMIN — PROPOFOL 30 MG: 10 INJECTION, EMULSION INTRAVENOUS at 08:07

## 2022-07-28 RX ADMIN — SODIUM CHLORIDE, SODIUM GLUCONATE, SODIUM ACETATE, POTASSIUM CHLORIDE AND MAGNESIUM CHLORIDE: 526; 502; 368; 37; 30 INJECTION, SOLUTION INTRAVENOUS at 06:07

## 2022-07-28 NOTE — PLAN OF CARE
Problem: Physical Therapy  Goal: Physical Therapy Goal  Description: Goals to be met by: 22    Patient will increase functional independence with mobility by performin. Sit to stand transfer with Modified [place], NWB R LE  2. Bed to chair transfer with Modified [place] using Rolling Walker, NWB R LE  3. Gait  x 150 feet with Modified [place] using Rolling Walker, NWB R LE    Outcome: Ongoing, Progressing

## 2022-07-28 NOTE — ANESTHESIA PROCEDURE NOTES
Intubation    Date/Time: 7/28/2022 7:13 AM  Performed by: Humphrey Bal  Authorized by: Carlin Dailey MD     Intubation:     Induction:  Intravenous    Intubated:  Postinduction    Mask Ventilation:  Easy mask    Attempts:  1    Attempted By:  CRNA and student (Humphrey Bal, RRNA)    Method of Intubation:  Direct    Blade:  Jessica 3    Laryngeal View Grade: Grade I - full view of cords      Difficult Airway Encountered?: No      Complications:  None    Airway Device:  Oral endotracheal tube    Airway Device Size:  7.5    Style/Cuff Inflation:  Cuffed (inflated to minimal occlusive pressure)    Inflation Amount (mL):  6    Tube secured:  21    Secured at:  The lips    Placement Verified By:  Capnometry    Complicating Factors:  None    Findings Post-Intubation:  BS equal bilateral

## 2022-07-28 NOTE — TRANSFER OF CARE
"Anesthesia Transfer of Care Note    Patient: Lauren Ring    Procedure(s) Performed: Procedure(s) (LRB):  INCISION AND DRAINAGE, LOWER EXTREMITY (Right)    Patient location: PACU    Anesthesia Type: general    Transport from OR: Transported from OR on 100% O2 by closed face mask with adequate spontaneous ventilation    Post pain: adequate analgesia    Post assessment: no apparent anesthetic complications and tolerated procedure well    Post vital signs: stable    Level of consciousness: awake and alert    Nausea/Vomiting: no nausea/vomiting    Complications: none    Transfer of care protocol was followed      Last vitals:   Visit Vitals  BP (!) 173/98 (BP Location: Right arm, Patient Position: Lying)   Pulse 87   Temp 37.1 °C (98.7 °F) (Oral)   Resp 17   Ht 5' 6" (1.676 m)   Wt 57.2 kg (126 lb 1.7 oz)   LMP  (LMP Unknown)   SpO2 (S) 100%   Breastfeeding No   BMI 20.35 kg/m²     "

## 2022-07-28 NOTE — ANESTHESIA PREPROCEDURE EVALUATION
07/28/2022  Lauren Ring is a 62 y.o., female.      Pre-op Assessment    I have reviewed the Patient Summary Reports.     I have reviewed the Nursing Notes. I have reviewed the NPO Status.   I have reviewed the Medications.     Review of Systems  Anesthesia Hx:  Denies Hx of Anesthetic complications  Denies Family Hx of Anesthesia complications.    Cardiovascular:   Denies MI.  Denies CAD.    Denies CABG/stent.     Pulmonary:   COPD (home oxygen as needed / usually nightly and anytime lying flat)    Hepatic/GI:   GERD (very rare symptoms with known food triggers / no symptoms this morning), well controlled    Musculoskeletal:   Arthritis     Neurological:  Neurology Normal    Endocrine:  Endocrine Normal    Psych:   depression          Physical Exam  General: Well nourished    Airway:  Mallampati: II   Mouth Opening: < 3 cm  TM Distance: Normal  Tongue: Normal  Neck ROM: Normal ROM    Dental:  Dentures        Anesthesia Plan  Type of Anesthesia, risks & benefits discussed:    Anesthesia Type: Gen Supraglottic Airway, Gen ETT  Intra-op Monitoring Plan: Standard ASA Monitors  Induction:  IV  Informed Consent: Informed consent signed with the Patient and all parties understand the risks and agree with anesthesia plan.  All questions answered.   ASA Score: 3    Ready For Surgery From Anesthesia Perspective.     .

## 2022-07-28 NOTE — INTERVAL H&P NOTE
The patient has been examined and the H&P has been reviewed:    I agree with my note from yesterday.  Patient needs removal of hardware and complex skin closure    Surgery risks, benefits and alternative options discussed and understood by patient/family.    I explained that surgery and the nature of their condition are not without risks. These include, but are not limited to, bleeding, infection, neurovascular compromise, malunion, nonunion, hardware complications, wound complications, scarring, cosmetic defects, need for later and/or repeated surgeries, pain, loss of ROM, loss of function, PTOA, deformity, stance/gait and/or functional abnormalities, thromboembolic complications, compartment syndrome, loss of limb, loss of life, anesthetic complications, and other imponderables. I explained that these can occur despite the adequacy of treatments rendered, and that their risks are heightened given the nature of their condition. They verbalized understanding. They would like to continue with surgery at this time. If appropriate family was involved with surgical discussion.       There are no hospital problems to display for this patient.

## 2022-07-28 NOTE — PT/OT/SLP EVAL
Physical Therapy Evaluation    Patient Name:  Lauren Ring   MRN:  [id number]    Recommendations:     Discharge Recommendations:  home health PT, rehabilitation facility, other (see comments) (pending progress)   Discharge Equipment Recommendations:     Barriers to discharge: limited mobility    Assessment:     Lauren Ring is a 62 y.o. female admitted with a medical diagnosis of <principal problem not specified>.  She presents with the following impairments/functional limitations:  weakness, impaired endurance, impaired functional mobility, gait instability, impaired balance, pain, orthopedic precautions.    Rehab Prognosis: Good; patient would benefit from acute skilled PT services to address these deficits and reach maximum level of function.    Recent Surgery: Procedure(s) (LRB):  INCISION AND DRAINAGE, LOWER EXTREMITY (Right) Day of Surgery    Plan:     During this hospitalization, patient to be seen daily (QD to BID) to address the identified rehab impairments via gait training, therapeutic activities, therapeutic exercises and progress toward the following goals:    · Plan of Care Expires:  08/28/22    Subjective     Chief Complaint: pain  Patient/Family Comments/goals: Pt motivated to return to walking  Pain/Comfort:  · Pain Rating 1: 9/10  · Location - Side 1: Right  · Location 1: ankle  · Pain Addressed 1: Pre-medicate for activity, Reposition    Patients cultural, spiritual, Restorationist conflicts given the current situation:      Living Environment:  Home with  and a caregiver a few times a week.  Prior to admission, patients level of function was modified independent.  Equipment used at home: wheelchair, walker, rolling.  DME owned (not currently used): crutches.  Upon discharge, patient will have assistance from family.    Objective:     Communicated with nurse, Ros, prior to session.  Patient found supine with PureWick  upon PT entry to room.    General Precautions: Standard,     Orthopedic  Precautions:RLE non weight bearing   Braces: N/A  Respiratory Status: Nasal cannula, flow 1 L/min    Exams:  · Cognitive Exam:  Patient is oriented to Person, Place, Time and Situation  · RLE ROM: WFL except ankle immobilized in splint  · RLE Strength: WFL except ankle immobilized in splint  · LLE ROM: WNL  · LLE Strength: WFL    Functional Mobility:  · Bed Mobility:     · Rolling Left:  contact guard assistance  · Rolling Right: contact guard assistance  · Supine to Sit: contact guard assistance  · Sit to Supine: contact guard assistance  · Transfers:     · Sit to Stand:  minimum assistance with rolling walker, NWB R LE  · Bed to Chair: deferred due to pt fatigue and dizziness upon standing  · Gait: 4 lateral steps to HOB with min A using RW, NWB R LE  · Balance: Supported single leg stance on L LE x 30sec. Pt with good awareness of WB restriction.      AM-PAC 6 CLICK MOBILITY  Total Score:      Patient left HOB elevated with all lines intact and call button in reach.    GOALS:   Multidisciplinary Problems     Physical Therapy Goals        Problem: Physical Therapy    Goal Priority Disciplines Outcome Goal Variances Interventions   Physical Therapy Goal     PT, PT/OT Ongoing, Progressing     Description: Goals to be met by: 22    Patient will increase functional independence with mobility by performin. Sit to stand transfer with Modified [place], NWB R LE  2. Bed to chair transfer with Modified [place] using Rolling Walker, NWB R LE  3. Gait  x 150 feet with Modified [place] using Rolling Walker, NWB R LE                     History:     Past Medical History:   Diagnosis Date    Anxiety     Closed nondisplaced pilon fracture of right tibia with routine healing     COPD (chronic obstructive pulmonary disease)     Depression     Emphysema lung     Lung cancer 2017       Past Surgical History:   Procedure Laterality Date    CARPAL TUNNEL RELEASE Bilateral     CATARACT EXTRACTION  Bilateral     HYSTERECTOMY      OPEN REDUCTION AND INTERNAL FIXATION (ORIF) OF INJURY OF ANKLE Right     THORACOTOMY      TONSILLECTOMY         Time Tracking:     PT Received On: 07/28/22  PT Start Time: 1233     PT Stop Time: 1300  PT Total Time (min): 27 min     Billable Minutes: Evaluation (mod)      07/28/2022

## 2022-07-28 NOTE — ANESTHESIA POSTPROCEDURE EVALUATION
Anesthesia Post Evaluation    Patient: Lauren Ring    Procedure(s) Performed: Procedure(s) (LRB):  INCISION AND DRAINAGE, LOWER EXTREMITY (Right)    Final Anesthesia Type: general      Patient location during evaluation: PACU  Patient participation: Yes- Able to Participate  Level of consciousness: awake and alert  Post-procedure vital signs: reviewed and stable  Pain control: improving with IV narcotic.  Airway patency: patent    PONV status at discharge: No PONV  Anesthetic complications: no      Respiratory status: unassisted  Hydration status: euvolemic  Follow-up not needed.          Vitals Value Taken Time   /96 07/28/22 0852   Temp > 97F 07/28/22 0900   Pulse 85 07/28/22 0859   Resp 16 07/28/22 0859   SpO2 100 % 07/28/22 0859   Vitals shown include unvalidated device data.      No case tracking events are documented in the log.      Pain/Merline Score: Pain Rating Prior to Med Admin: 9 (7/28/2022  8:55 AM)

## 2022-07-28 NOTE — PLAN OF CARE
Visited with pt who is alert and oriented. Pt tells me she lives at home with  miguel and has caregiver who has 32 hours per week through [employer] program  Pt lives in a mobile home with ramp, has wheelchair with leg rests, rollator, front wheeled walker, shower chair and crutches. Pt states she has never been able to use the crutches.   Pt has no home health and her PCP is Adin Munguia.  Will follow to see what therapy and dr recommendations are.

## 2022-07-28 NOTE — CONSULTS
[facility] Hospital Medicine Consultation Note        Patient Name: Lauren Ring  MRN: [id number]  Patient Class: IP- Inpatient   Admission Date: 7/28/2022  5:01 AM  Length of Stay: 0  Attending Physician: Dr. Hassan  Primary Care Provider: WINNIE Luu  Face-to-Face encounter date: 07/28/2022   Consulting Physician: [facility] - Dr. Banegas  Reason for Consult: Medical Management  Chief Complaint: No chief complaint on file.        Patient information was obtained from patient, patient's family, past medical records.    HISTORY OF PRESENT ILLNESS:   Lauren Ring is a 62 y.o. female with PMHx COPD, chronic hepatitis C treated and cured, anxiety, Stage 1B NSCLC - Adenocarcinoma, RUL, s/p Right upper pulmonary lobectomy & mediastinal lymph node dissection on 6/2/17. She was admitted to [facility] on 7/28/2022 for a scheduled elective procedure. Of note, patient initially fell and sustained right tibia/fibula fx on 2/21/21, she underwent ex fix on 3/9/21 followed by ORIF on 3/23/21. Patient reportedly started having some intermittent drainage from her surgical incision over the past few months. She was seen at Urgent Care x1 week ago and given oral Clindamycin. Reported she was unable to ambulate over the past few weeks, and saw Ortho on 7/25/22, was noted to have a blister over the medial wound that has since opened and was draining. She presented to [facility] on 7/28/22 and underwent I&D right ankle with hardware removal by Dr. Hassan. Hospital Medicine team was consulted for medical management.    PAST MEDICAL HISTORY:   COPD, chronic hepatitis C treated and cured, anxiety, Stage 1B NSCLC - Adenocarcinoma, RUL, s/p Right upper pulmonary lobectomy & mediastinal lymph node dissection on 6/2/17 in remission; closed, displaced R tib/fib fx s/p ORIF on 3/23/21    PAST SURGICAL HISTORY:     Past Surgical History:   Procedure Laterality Date    CARPAL TUNNEL RELEASE Bilateral     CATARACT  EXTRACTION Bilateral     HYSTERECTOMY      OPEN REDUCTION AND INTERNAL FIXATION (ORIF) OF INJURY OF ANKLE Right     THORACOTOMY      TONSILLECTOMY         ALLERGIES:   Codeine and Tetanus toxoid    FAMILY HISTORY:   Reviewed and negative    SOCIAL HISTORY:     Social History     Tobacco Use    Smoking status: Former Smoker    Smokeless tobacco: Never Used   Substance Use Topics    Alcohol use: Yes        HOME MEDICATIONS:     Prior to Admission medications    Medication Sig Start Date End Date Taking? Authorizing Provider   albuterol-ipratropium (DUO-NEB) 2.5 mg-0.5 mg/3 mL nebulizer solution Take 3 mLs by nebulization 3 (three) times daily as needed. 7/5/22  Yes Historical Provider   ALPRAZolam (XANAX) 0.25 MG tablet Take 0.25 mg by mouth 2 (two) times daily as needed. 6/24/22  Yes Historical Provider   aspirin (ECOTRIN) 81 MG EC tablet Take 81 mg by mouth once daily.   Yes Historical Provider   naproxen sodium (ANAPROX) 220 MG tablet Take 220 mg by mouth 2 (two) times daily as needed.   Yes Historical Provider   PROAIR HFA 90 mcg/actuation inhaler Inhale 2 puffs into the lungs every 6 (six) hours as needed. 6/29/22  Yes Historical Provider   SYMBICORT 160-4.5 mcg/actuation HFAA Inhale 1 puff into the lungs Daily. 4/25/22  Yes Historical Provider   traZODone (DESYREL) 50 MG tablet Take 1 tablet by mouth nightly. 7/25/22  Yes Historical Provider   clopidogreL (PLAVIX) 75 mg tablet Take 75 mg by mouth once daily. 5/9/22   Historical Provider   isosorbide mononitrate (IMDUR) 30 MG 24 hr tablet Take 30 mg by mouth once daily. 5/9/22   Historical Provider       REVIEW OF SYSTEMS:   Except as documented, all other systems reviewed and negative     PHYSICAL EXAM:     VITAL SIGNS: 24 HRS MIN & MAX LAST   Temp  Min: 97.5 °F (36.4 °C)  Max: 98.7 °F (37.1 °C) 98.5 °F (36.9 °C)   BP  Min: 110/78  Max: 173/98 119/74   Pulse  Min: 65  Max: 90  78   Resp  Min: 13  Max: 20 18   SpO2  Min: 95 %  Max: 100 % 98 %       Vitals  Reviewed  General appearance: Well-developed, well-nourished female in no apparent distress.  HENT: Atraumatic head. Moist mucous membranes of oral cavity.  Eyes: Normal extraocular movements.   Neck: Supple.   Lungs: Clear to auscultation bilaterally.  Heart: Regular rate and rhythm.   Abdomen: Soft, non-distended, non-tender.   Extremities: RLE dressing  Skin: No Rash.   Neuro: Motor and sensory exams grossly intact.   Psych/mental status: Appropriate mood and affect. Responds appropriately to questions.     LABS AND IMAGING:     Recent Labs   Lab 07/28/22  0551   WBC 12.5*   RBC 4.48   HGB 13.0   HCT 42.2   MCV 94.2*   MCH 29.0   MCHC 30.8*   RDW 14.6      MPV 10.5*       Recent Labs   Lab 07/28/22  0551      K 4.3   CO2 19*   BUN 8.7*   CREATININE 0.71   CALCIUM 9.3        Microbiology Results (last 7 days)     ** No results found for the last 168 hours. **             ASSESSMENT & PLAN:   Chronic R Ankle Wound s/p I&D and removal of hardware on 7/28/22  Hx of Closed, displaced R tib/fib fx s/p ORIF on 3/23/21  Leukocytosis 2/2 above  Metabolic Acidosis   Anxiety  Hx of COPD, chronic hepatitis C treated and cured, Stage 1B NSCLC - Adenocarcinoma, RUL, s/p Right upper pulmonary lobectomy & mediastinal lymph node dissection on 6/2/17 in remission    Plan:  NWB RLE per ortho  Resume appropriate home medications  Labs in AM  We will follow along      VTE Prophylaxis: Already on Lovenox    __________________________________________________________________________  INPATIENT LIST OF MEDICATIONS     Current Facility-Administered Medications:     acetaminophen 1,000 mg/100 mL (10 mg/mL) injection 1,000 mg, 1,000 mg, Intravenous, Q8H, Katie Grider PA-C, Last Rate: 400 mL/hr at 07/28/22 1350, 1,000 mg at 07/28/22 1350    albuterol inhaler 2 puff, 2 puff, Inhalation, Q6H PRN, Katie Grider PA-C    albuterol-ipratropium 2.5 mg-0.5 mg/3 mL nebulizer solution 3 mL, 3 mL, Nebulization, TID PRN,  Katie Grider PA-C    aspirin EC tablet 81 mg, 81 mg, Oral, Daily, Katie Grider, PA-C, 81 mg at 07/28/22 1247    bisacodyL suppository 10 mg, 10 mg, Rectal, Daily PRN, STEFANI Campos-C    cefazolin (ANCEF) 2 gram in dextrose 5% 50 mL IVPB (premix), 2 g, Intravenous, Q8H, STEFANI Campos-KAI    docusate sodium capsule 100 mg, 100 mg, Oral, Daily, Katie Grider, PA-C, 100 mg at 07/28/22 1247    enoxaparin injection 40 mg, 40 mg, Subcutaneous, Daily, STEFANI Campos-KAI    famotidine tablet 20 mg, 20 mg, Oral, Daily PRN, Katie Grider PA-C    fluticasone furoate-vilanteroL 100-25 mcg/dose diskus inhaler 1 puff, 1 puff, Inhalation, Daily, STEFANI Campos-C    HYDROmorphone (PF) injection 0.2 mg, 0.2 mg, Intravenous, Q5 Min PRN, Carlin Dailey MD, 0.2 mg at 07/28/22 0945    isosorbide mononitrate 24 hr tablet 30 mg, 30 mg, Oral, Daily, STEFANI Campos-C    magnesium hydroxide 400 mg/5 ml suspension 2,400 mg, 30 mL, Oral, Daily PRN, STEFANI Campos-C    methocarbamoL tablet 750 mg, 750 mg, Oral, TID, Katie Grider, PA-C, 750 mg at 07/28/22 1351    metoclopramide HCl injection 10 mg, 10 mg, Intravenous, Q10 Min PRN, Carlin Dailey MD    morphine injection 4 mg, 4 mg, Intravenous, Q6H PRN, STEFANI Campos-C    ondansetron disintegrating tablet 4 mg, 4 mg, Oral, Q8H PRN, STEFANI Campos-C    ondansetron injection 4 mg, 4 mg, Intravenous, Daily PRN, Carlin Dailey MD    ondansetron injection 4 mg, 4 mg, Intravenous, Q6H PRN, Katie Grider PA-C    oxyCODONE immediate release tablet 10 mg, 10 mg, Oral, Q4H PRN, Katie Grider PA-C, 10 mg at 07/28/22 1355    oxyCODONE immediate release tablet 5 mg, 5 mg, Oral, Q4H PRN, Katie Grider PA-C    senna tablet 8.6 mg, 8.6 mg, Oral, Daily PRN, Katie Grider PA-C    sodium chloride 0.9% flush 10 mL, 10 mL, Intravenous, PRN, Carlin Dailey MD     traZODone tablet 50 mg, 50 mg, Oral, Nightly, Katie Grider PA-C      Scheduled Meds:   acetaminophen  1,000 mg Intravenous Q8H    aspirin  81 mg Oral Daily    ceFAZolin (ANCEF) IVPB  2 g Intravenous Q8H    docusate sodium  100 mg Oral Daily    enoxaparin  40 mg Subcutaneous Daily    fluticasone furoate-vilanteroL  1 puff Inhalation Daily    isosorbide mononitrate  30 mg Oral Daily    methocarbamoL  750 mg Oral TID    traZODone  50 mg Oral Nightly     Continuous Infusions:  PRN Meds:.albuterol, albuterol-ipratropium, bisacodyL, famotidine, HYDROmorphone, magnesium hydroxide 400 mg/5 ml, metoclopramide HCl, morphine, ondansetron, ondansetron, ondansetron, oxyCODONE, oxyCODONE, senna, sodium chloride 0.9%    RADIOLOGY                                                                                                    SURG FL Surgery Fluoro Usage  See OP Notes for results.     IMPRESSION: See OP Notes for results.     This procedure was auto-finalized by: Virtual Radiologist  X-Ray Chest PA And Lateral  Narrative: EXAMINATION:  XR CHEST PA AND LATERAL    CLINICAL HISTORY:  pre op;    TECHNIQUE:  Two views    COMPARISON:  June 9, 2007.    FINDINGS:  Cardiopericardial silhouette is within normal limits.  Lungs emphysematous changes without dense focal or segmental consolidation, congestion, pleural effusion or pneumothorax.  Impression: No acute cardiopulmonary process identified.    Electronically signed by: Quirino Dimas  Date:    07/28/2022  Time:    07:21          IIgnacia NP have reviewed and discussed the case with Dr. Banegas.  Please see the following addendum for further assessment and plan from there attending MD.  07/28/2022    ______________________________________________________________________________    MD Addendum:    Dr darrick PRECIADO_ assumed care of this patient today at _4pm  For the patient encounter, I performed the substantive portion of the visit, I reviewed the NPPA  documentation, treatment plan, and medical decision making.  I had face to face time with this patient       A. History:  62 y.o. female with PMHx COPD, chronic hepatitis C treated and cured, anxiety, Stage 1B NSCLC - Adenocarcinoma, RUL, s/p Right upper pulmonary lobectomy & mediastinal lymph node dissection on 6/2/17. She was admitted to [facility] on 7/28/2022 for a scheduled elective procedure. Of note, patient initially fell and sustained right tibia/fibula fx on 2/21/21, she underwent ex fix on 3/9/21 followed by ORIF on 3/23/21. Patient reportedly started having some intermittent drainage from her surgical incision over the past few months. She was seen at Urgent Care x1 week ago and given oral Clindamycin. Reported she was unable to ambulate over the past few weeks, and saw Ortho on 7/25/22, was noted to have a blister over the medial wound that has since opened and was draining. She presented to [facility] on 7/28/22 and underwent I&D right ankle with hardware removal by Dr. Hassan. Hospital Medicine team was consulted for medical management.        B. Physical exam:  General appearance: Well-developed, well-nourished female in no apparent distress. On 2L  HENT: Atraumatic head. Moist mucous membranes of oral cavity.  Eyes: Normal extraocular movements.   Neck: Supple.   Lungs: Clear to auscultation bilaterally.  Heart: Regular rate and rhythm.   Abdomen: Soft, non-distended, non-tender.   Extremities: RLE dressing  Skin: No Rash.   Neuro: Motor and sensory exams grossly intact.   Psych/mental status: Appropriate mood and affect. Responds appropriately to questions.         C. Medical decision making:    ASSESSMENT & PLAN:   Chronic R Ankle Wound s/p I&D and removal of hardware on 7/28/22  Hx of Closed, displaced R tib/fib fx s/p ORIF on 3/23/21  Leukocytosis 2/2 above likely reactive   Metabolic Acidosis   Anxiety  Hx of COPD, chronic hepatitis C treated and cured, Stage 1B NSCLC - Adenocarcinoma, RUL, s/p Right upper  pulmonary lobectomy & mediastinal lymph node dissection on 6/2/17 in remission    Plan:  Continue o2 supplementation to goal 88-92% , wean off   Telemetry monitoring   Leucocytosis likely reactive trend till normal  NWB RLE per ortho  Resume appropriate home medications  Labs in AM  We will follow along    Thank you for the consult, will be happy to follow alongside you or take over as primary       All diagnosis and differential diagnosis have been reviewed; assessment and plan has been documented; I have personally reviewed the labs and test results that are presently available; I have reviewed the patients medication list; I have reviewed the consulting providers response and recommendations. I have reviewed or attempted to review medical records based upon their availability.    All of the patient and family questions have been addressed and answered. Patient's is agreeable to the above stated plan. I will continue to monitor closely and make adjustments to medical management as needed.      darrick mccann md

## 2022-07-28 NOTE — OP NOTE
OPERATIVE REPORT    Patient: Lauren Ring   : 1960    MRN: [id number]  Date: 2022      Surgeon:Javan Hassan DO  Assistant: Brayan Grider was essential, part of the procedure including deep hardware placement and deep closure.  No senior assistant or resident was availible  Preoperative Diagnosis:  Right ankle symptomatic implant, deep, right ankle draining sinus tract  Postoperative Diagnosis: Same  Procedure:  Removal Right ankle deep implant under anesthesia - , excisional debridement sinus tract Right medial tibia 65095  Anesthesiologist: Carlin Dailey MD  OR Staff: Circulator: Ignacia Meza RN; Tejinder Arana RN  Physician Assistant: Katie Grider PA-C  Scrub Person: ST Latricia; Herrera Herndon  Implants:   Implant Name Type Inv. Item Serial No.  Lot No. LRB No. Used Action   KIT STIMULAN RAPID CURE 5CC - [id number]  KIT STIMULAN RAPID CURE 5CC  [employer] [id number] Right 1 Implanted     EBL: 50cc  Complications: None  Disposition: To PACU, stable    Indications: Lauren Ring is a 62 y.o. female presenting with the aforementioned injuries/findings.  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  She has chronic draining wounds from her ankle appear to be small serous sanguinous drainage she has a history of severe COPD and does not wear her oxygen as expected.  This time she now has pain and a blister over the medial wound which has popped and let the full-thickness loss.  No sign of purulence erythema.  The procedure is indicated to best alleviate symptoms of the implant remaining in place.  The patient is awake and alert. After thorough discussion of the risks, benefits, expected outcomes, and alternatives to surgical intervention, the patient agreed to proceed with surgical treatment.  Specific risks discussed included, but were not limited to: superficial or deep infection, wound healing complications,  DVT/PE, significant bleeding requiring transfusion, damage to named anatomic structures in the immediate area including named neurovascular structures, failure to alleviate symptoms, refracture, and general risks of anesthesia.  The patient voiced understanding and written as well as verbal consent was obtained by myself prior to the procedure.    Procedure Note:  The patient was brought back to the OR and placed supine on the OR table. After successful induction of anesthesia by anesthesia staff, the patient was positioned in the supine position and all bony prominences were padded appropriately.  The surgical field was then provisionally cleansed and then prepped and draped in the usual sterile fashion.    At this time a time-out was performed, with the correct patient, site, and procedure identified.  The universal time out as well as sign your site protocols were followed.  Preoperative antibiotics were verified as administered.     My attention is drawn to the medial aspect of the tibia shows that she sinus tract and wound dehiscence the midportion over the medial incision this does not appear to be overlying hardware.  No sign of purulence.  No sign of infection.    The previous surgical sites were utilized.  Attention to hemostasis was paid using electrocautery.  We were able to dissect down to the implant on the right tibia and fibula without difficulty.  The appropriate manual screwdrivers were utilized to remove the implant(s) without difficulty.  Wound cultures sent for analysis. Final C-arm images were obtained and saved to the [employer] system after the implant(s) were removed.    An abscess/infection was not noted.  We did not need to perform any debridement for infection.  Patient did have a sinus tract in the medial aspect of her tibia which we then excised the draining sinus tract.  This is completed with a 15 blade.  I then used a curette and debrided necrotic bone on the anterior medial aspect of  the tibia following that we copiously irrigated the wound followed by Stimulan vancomycin implant.    The incisions were then irrigated using copious sterile saline. Vancomycin was in bed placed in the wound then closed in layered fashion.  The surgical sites were sterilely cleansed and dressed.    The patient was then subsequently extubated and transferred to to PACU in a stable condition.    All sponge and needle counts were correct at the end of the case.  I was present and participated in all aspects of the procedure.    Prognosis:  The patient will be kept NWB on the ipsilateral extremity for 6-8weeks.  DVT prophylaxis is indicated for this patient and procedure.  The patient is at risk of the aforementioned and this was reviewed with the patient again postoperatively.      I have discussed with her that she has an open wound on the medial aspect of her incision which may need further care including chronic wound management and even plastic surgery management.  At this time will consult Wound Care for daily dressing changes followed by likely long-term home care.  Due to her lack of compliance with her oxygen and her chronic comorbidities and concerned that she will likely lead to complications with these chronic draining wounds.         This note/OR report was created with the assistance of  voice recognition software or phone  dictation.  There may be transcription errors as a result of using this technology however minimal. Effort has been made to assure accuracy of transcription but any obvious errors or omissions should be clarified with the author of the document.       Javan Hassan,   Orthopedic Trauma Surgery

## 2022-07-28 NOTE — CLINICAL REVIEW
62-year-old female admitted on 07/28/2022.  The patient has draining sinus tracts in the mediolateral side of her ankle.  The patient presented with a large hematoma versus abscess over her plate.  The patient underwent right ankle deep implant removal.  Recommendations are for a nonweightbearing of her ipsilateral extremity for up to 2 months.  No evidence of carson operative complications, active comorbidities.  Surgery is not IP only list.  Remains appropriate for obseration LOC    YONG PATEL

## 2022-07-29 LAB
ALBUMIN SERPL-MCNC: 2.8 GM/DL (ref 3.4–4.8)
ALBUMIN/GLOB SERPL: 1 RATIO (ref 1.1–2)
ALP SERPL-CCNC: 51 UNIT/L (ref 40–150)
ALT SERPL-CCNC: 10 UNIT/L (ref 0–55)
AST SERPL-CCNC: 16 UNIT/L (ref 5–34)
BASOPHILS # BLD AUTO: 0.06 X10(3)/MCL (ref 0–0.2)
BASOPHILS NFR BLD AUTO: 0.5 %
BILIRUBIN DIRECT+TOT PNL SERPL-MCNC: 0.3 MG/DL
BUN SERPL-MCNC: 5.9 MG/DL (ref 9.8–20.1)
CALCIUM SERPL-MCNC: 8.4 MG/DL (ref 8.4–10.2)
CHLORIDE SERPL-SCNC: 107 MMOL/L (ref 98–107)
CO2 SERPL-SCNC: 24 MMOL/L (ref 23–31)
CREAT SERPL-MCNC: 0.67 MG/DL (ref 0.55–1.02)
EOSINOPHIL # BLD AUTO: 0.05 X10(3)/MCL (ref 0–0.9)
EOSINOPHIL NFR BLD AUTO: 0.4 %
ERYTHROCYTE [DISTWIDTH] IN BLOOD BY AUTOMATED COUNT: 14.5 % (ref 11.5–17)
ESTROGEN SERPL-MCNC: NORMAL PG/ML
GLOBULIN SER-MCNC: 2.9 GM/DL (ref 2.4–3.5)
GLUCOSE SERPL-MCNC: 113 MG/DL (ref 82–115)
HCT VFR BLD AUTO: 36 % (ref 37–47)
HGB BLD-MCNC: 11 GM/DL (ref 12–16)
IMM GRANULOCYTES # BLD AUTO: 0.05 X10(3)/MCL (ref 0–0.04)
IMM GRANULOCYTES NFR BLD AUTO: 0.4 %
INSULIN SERPL-ACNC: NORMAL U[IU]/ML
LAB AP CLINICAL INFORMATION: NORMAL
LAB AP GROSS DESCRIPTION: NORMAL
LAB AP REPORT FOOTNOTES: NORMAL
LYMPHOCYTES # BLD AUTO: 2.22 X10(3)/MCL (ref 0.6–4.6)
LYMPHOCYTES NFR BLD AUTO: 17.6 %
MCH RBC QN AUTO: 28.6 PG (ref 27–31)
MCHC RBC AUTO-ENTMCNC: 30.6 MG/DL (ref 33–36)
MCV RBC AUTO: 93.5 FL (ref 80–94)
MONOCYTES # BLD AUTO: 1.57 X10(3)/MCL (ref 0.1–1.3)
MONOCYTES NFR BLD AUTO: 12.5 %
NEUTROPHILS # BLD AUTO: 8.6 X10(3)/MCL (ref 2.1–9.2)
NEUTROPHILS NFR BLD AUTO: 68.6 %
NRBC BLD AUTO-RTO: 0 %
PLATELET # BLD AUTO: 209 X10(3)/MCL (ref 130–400)
PMV BLD AUTO: 10.8 FL (ref 7.4–10.4)
POCT GLUCOSE: 112 MG/DL (ref 70–110)
POTASSIUM SERPL-SCNC: 4.4 MMOL/L (ref 3.5–5.1)
PROT SERPL-MCNC: 5.7 GM/DL (ref 5.8–7.6)
RBC # BLD AUTO: 3.85 X10(6)/MCL (ref 4.2–5.4)
SODIUM SERPL-SCNC: 139 MMOL/L (ref 136–145)
T3RU NFR SERPL: NORMAL %
WBC # SPEC AUTO: 12.6 X10(3)/MCL (ref 4.5–11.5)

## 2022-07-29 PROCEDURE — 25000242 PHARM REV CODE 250 ALT 637 W/ HCPCS: Performed by: PHYSICIAN ASSISTANT

## 2022-07-29 PROCEDURE — 80053 COMPREHEN METABOLIC PANEL: CPT | Performed by: PHYSICIAN ASSISTANT

## 2022-07-29 PROCEDURE — 96372 THER/PROPH/DIAG INJ SC/IM: CPT | Performed by: PHYSICIAN ASSISTANT

## 2022-07-29 PROCEDURE — 27000221 HC OXYGEN, UP TO 24 HOURS

## 2022-07-29 PROCEDURE — 96366 THER/PROPH/DIAG IV INF ADDON: CPT

## 2022-07-29 PROCEDURE — 96376 TX/PRO/DX INJ SAME DRUG ADON: CPT

## 2022-07-29 PROCEDURE — 97116 GAIT TRAINING THERAPY: CPT | Mod: CQ

## 2022-07-29 PROCEDURE — 94761 N-INVAS EAR/PLS OXIMETRY MLT: CPT

## 2022-07-29 PROCEDURE — 63600175 PHARM REV CODE 636 W HCPCS: Performed by: PHYSICIAN ASSISTANT

## 2022-07-29 PROCEDURE — 85025 COMPLETE CBC W/AUTO DIFF WBC: CPT | Performed by: PHYSICIAN ASSISTANT

## 2022-07-29 PROCEDURE — 25000003 PHARM REV CODE 250: Performed by: PHYSICIAN ASSISTANT

## 2022-07-29 PROCEDURE — 36415 COLL VENOUS BLD VENIPUNCTURE: CPT | Performed by: PHYSICIAN ASSISTANT

## 2022-07-29 PROCEDURE — G0378 HOSPITAL OBSERVATION PER HR: HCPCS

## 2022-07-29 PROCEDURE — 96365 THER/PROPH/DIAG IV INF INIT: CPT

## 2022-07-29 RX ADMIN — OXYCODONE 10 MG: 5 TABLET ORAL at 05:07

## 2022-07-29 RX ADMIN — TRAZODONE HYDROCHLORIDE 50 MG: 50 TABLET ORAL at 09:07

## 2022-07-29 RX ADMIN — OXYCODONE 10 MG: 5 TABLET ORAL at 10:07

## 2022-07-29 RX ADMIN — METHOCARBAMOL 750 MG: 750 TABLET ORAL at 09:07

## 2022-07-29 RX ADMIN — FLUTICASONE FUROATE AND VILANTEROL TRIFENATATE 1 PUFF: 100; 25 POWDER RESPIRATORY (INHALATION) at 08:07

## 2022-07-29 RX ADMIN — CEFAZOLIN SODIUM 2 G: 2 SOLUTION INTRAVENOUS at 08:07

## 2022-07-29 RX ADMIN — OXYCODONE 10 MG: 5 TABLET ORAL at 11:07

## 2022-07-29 RX ADMIN — CEFAZOLIN SODIUM 2 G: 2 SOLUTION INTRAVENOUS at 12:07

## 2022-07-29 RX ADMIN — METHOCARBAMOL 750 MG: 750 TABLET ORAL at 05:07

## 2022-07-29 RX ADMIN — SENNOSIDES 8.6 MG: 8.6 TABLET, FILM COATED ORAL at 08:07

## 2022-07-29 RX ADMIN — OXYCODONE 10 MG: 5 TABLET ORAL at 06:07

## 2022-07-29 RX ADMIN — ASPIRIN 81 MG: 81 TABLET, COATED ORAL at 08:07

## 2022-07-29 RX ADMIN — ACETAMINOPHEN 1000 MG: 10 INJECTION, SOLUTION INTRAVENOUS at 05:07

## 2022-07-29 RX ADMIN — CEFAZOLIN SODIUM 2 G: 2 SOLUTION INTRAVENOUS at 02:07

## 2022-07-29 RX ADMIN — CEFAZOLIN SODIUM 2 G: 2 SOLUTION INTRAVENOUS at 11:07

## 2022-07-29 RX ADMIN — OXYCODONE 10 MG: 5 TABLET ORAL at 01:07

## 2022-07-29 RX ADMIN — OXYCODONE 10 MG: 5 TABLET ORAL at 02:07

## 2022-07-29 RX ADMIN — DOCUSATE SODIUM 100 MG: 50 CAPSULE, LIQUID FILLED ORAL at 08:07

## 2022-07-29 RX ADMIN — ENOXAPARIN SODIUM 40 MG: 40 INJECTION SUBCUTANEOUS at 04:07

## 2022-07-29 RX ADMIN — METHOCARBAMOL 750 MG: 750 TABLET ORAL at 01:07

## 2022-07-29 NOTE — PT/OT/SLP PROGRESS
Physical Therapy Treatment    Patient Name:  Lauren Ring   MRN:  [id number]    Recommendations:     Discharge Recommendations:  home health PT, rehabilitation facility, other (see comments) (pending progress)   Discharge Equipment Recommendations:       Subjective     Patient awake and alert.     Objective:     General Precautions: Standard,     Orthopedic Precautions:RLE non weight bearing   Braces:    Respiratory Status: Nasal cannula, flow 2 L/min     Communicated with nurse prior to treatment.     Functional Mobility:    Rolling:Stand-by Assistance  Supine to sit:Stand-by Assistance  Sit to stand transfer: Stand-by Assistance  Bed to chair transfer:Stand-by Assistance  Gait 17 ft and 15 ft with RW NWB RLE min assist. SOB and sats 92% and 's with gait but decreased after rest.       AM-PAC 6 CLICK MOBILITY        Patient left up in chair with call button in reach..    GOALS:   Multidisciplinary Problems     Physical Therapy Goals        Problem: Physical Therapy    Goal Priority Disciplines Outcome Goal Variances Interventions   Physical Therapy Goal     PT, PT/OT Ongoing, Progressing     Description: Goals to be met by: 22    Patient will increase functional independence with mobility by performin. Sit to stand transfer with Modified Baggs, NWB R LE  2. Bed to chair transfer with Modified Baggs using Rolling Walker, NWB R LE  3. Gait  x 150 feet with Modified Baggs using Rolling Walker, NWB R LE                     Assessment:     Lauren Ring is a 62 y.o. female admitted with a medical diagnosis of <principal problem not specified>.     Rehab Prognosis: Good; patient would benefit from acute skilled PT services to address these deficits and reach maximum level of function.    Recent Surgery: Procedure(s) (LRB):  INCISION AND DRAINAGE, LOWER EXTREMITY (Right) 1 Day Post-Op    Plan:     During this hospitalization, patient to be seen daily (QD to BID) to address the  identified rehab impairments via gait training, therapeutic activities, therapeutic exercises and progress toward the following goals:    · Plan of Care Expires:  08/28/22    Billable Minutes     Billable Minutes: Gait Training 23    Treatment Type: Treatment  PT/PTA: PTA     PTA Visit Number: 1     07/29/2022

## 2022-07-29 NOTE — PLAN OF CARE
Problem: Adult Inpatient Plan of Care  Goal: Plan of Care Review  Outcome: Ongoing, Progressing  Goal: Patient-Specific Goal (Individualized)  Outcome: Ongoing, Progressing  Goal: Absence of Hospital-Acquired Illness or Injury  Outcome: Ongoing, Progressing  Goal: Optimal Comfort and Wellbeing  Outcome: Ongoing, Progressing  Goal: Readiness for Transition of Care  Outcome: Ongoing, Progressing     Problem: Infection  Goal: Absence of Infection Signs and Symptoms  Outcome: Ongoing, Progressing     Problem: Impaired Wound Healing  Goal: Optimal Wound Healing  Outcome: Ongoing, Progressing     Problem: Adult Inpatient Plan of Care  Goal: Plan of Care Review  Outcome: Ongoing, Progressing  Goal: Patient-Specific Goal (Individualized)  Outcome: Ongoing, Progressing  Goal: Absence of Hospital-Acquired Illness or Injury  Outcome: Ongoing, Progressing  Goal: Optimal Comfort and Wellbeing  Outcome: Ongoing, Progressing  Goal: Readiness for Transition of Care  Outcome: Ongoing, Progressing     Problem: Infection  Goal: Absence of Infection Signs and Symptoms  Outcome: Ongoing, Progressing     Problem: Impaired Wound Healing  Goal: Optimal Wound Healing  Outcome: Ongoing, Progressing

## 2022-07-29 NOTE — PROGRESS NOTES
[facility]  Orthopedics  Progress Note    Patient Name: Lauren Ring  MRN: [id number]  Admission Date: 7/28/2022  Hospital Length of Stay: 1 days  Attending Provider: Javan Hassan DO  Primary Care Provider: WINNIE Luu  Follow-up For: Procedure(s) (LRB):  INCISION AND DRAINAGE, LOWER EXTREMITY (Right)    Post-Operative Day: 1 Day Post-Op  Subjective:     Principal Problem:<principal problem not specified>    Principal Orthopedic Problem: 1 Day Post-Op   Hardware removal with I&d to right lower ext    Interval History: patient resting comfortably. Pain is well controlled this morning. NO acute events overnight. Some questions regarding discharge plan. Was able to mobilize some with therapy yesterday evening per her review. Would like to go home if possible but will need daily wound care visit.     Review of patient's allergies indicates:   Allergen Reactions    Codeine     Tetanus toxoid        Current Facility-Administered Medications   Medication    albuterol inhaler 2 puff    albuterol-ipratropium 2.5 mg-0.5 mg/3 mL nebulizer solution 3 mL    aspirin EC tablet 81 mg    bisacodyL suppository 10 mg    cefazolin (ANCEF) 2 gram in dextrose 5% 50 mL IVPB (premix)    docusate sodium capsule 100 mg    enoxaparin injection 40 mg    famotidine tablet 20 mg    fluticasone furoate-vilanteroL 100-25 mcg/dose diskus inhaler 1 puff    isosorbide mononitrate 24 hr tablet 30 mg    magnesium hydroxide 400 mg/5 ml suspension 2,400 mg    methocarbamoL tablet 750 mg    morphine injection 4 mg    ondansetron disintegrating tablet 4 mg    ondansetron injection 4 mg    ondansetron injection 4 mg    oxyCODONE immediate release tablet 10 mg    oxyCODONE immediate release tablet 5 mg    senna tablet 8.6 mg    sodium chloride 0.9% flush 10 mL    traZODone tablet 50 mg     Objective:     Vital Signs (Most Recent):  Temp: 98.6 °F (37 °C) (07/29/22 0354)  Pulse: 86 (07/29/22  "0354)  Resp: 18 (07/29/22 0545)  BP: 123/83 (07/29/22 0354)  SpO2: 99 % (07/29/22 0354) Vital Signs (24h Range):  Temp:  [97.5 °F (36.4 °C)-99.3 °F (37.4 °C)] 98.6 °F (37 °C)  Pulse:  [65-94] 86  Resp:  [13-20] 18  SpO2:  [95 %-100 %] 99 %  BP: (110-173)/(66-98) 123/83     Weight: 57.2 kg (126 lb 1.7 oz)  Height: 5' 6" (167.6 cm)  Body mass index is 20.35 kg/m².      Intake/Output Summary (Last 24 hours) at 7/29/2022 0722  Last data filed at 7/28/2022 0848  Gross per 24 hour   Intake 900 ml   Output --   Net 900 ml       Physical Exam:   Musculoskeletal:       Right lower extremity:Dressing CDI without signs of drainage; compartments are soft and compressible;Tolerates passive ROM of the knee and ankle; appropriate tenderness to palpation; dorsi/plantar flexes the foot; SILT distally; BCR distally; DP pulse palpable        Diagnostic Findings:   Significant Labs:   Recent Lab Results       07/29/22  0459   07/28/22  0551        Albumin/Globulin Ratio 1.0         Albumin 2.8         Alkaline Phosphatase 51         ALT 10         Anion Gap   9.0       AST 16         Baso # 0.06   0.11       Basophil % 0.5   0.9       BILIRUBIN TOTAL 0.3         BUN 5.9   8.7       BUN/CREAT RATIO   12       Calcium 8.4   9.3       Chloride 107   111       CO2 24   19       Creatinine 0.67   0.71       eGFR if non African American >60   >60       Eos # 0.05   0.19       Eosinophil % 0.4   1.5       Globulin, Total 2.9         Glucose 113   106       Hematocrit 36.0   42.2       Hemoglobin 11.0   13.0       Immature Grans (Abs) 0.05   0.10       Immature Granulocytes 0.4   0.8       INR   1.01       Lymph # 2.22   2.79       LYMPH % 17.6   22.3       MCH 28.6   29.0       MCHC 30.6   30.8       MCV 93.5   94.2       Mono # 1.57   1.48       Mono % 12.5   11.8       MPV 10.8   10.5       Neut # 8.6   7.8       Neut % 68.6   62.7       nRBC 0.0   0.0       Platelets 209   232       Potassium 4.4   4.3       PROTEIN TOTAL 5.7         " Protime   13.2       RBC 3.85   4.48       RDW 14.5   14.6       Sodium 139   139       WBC 12.6   12.5              Significant Imaging: I have reviewed and interpreted all pertinent imaging results/findings.     Assessment/Plan:     There are no hospital problems to display for this patient.  patient s/p hardware removal and I&D of draining sinus tract to the right ankle.   Area of tissue loss will need daily wound care.Will consult wound care team to evaluate and treat. Likely to need daily wound care which the patient may benefit from a short rehab stay until she progresses to less frequent wound care which would be attainable as an outpatient status.   Remain NWB to the RLE for approx 3 weeks until followup. Daily dry dressing changes beginning tomorrow unless wound care is able to evaluate sooner.  Plan for DC home with home health vs to rehab likely Monday.  Follow up with Dr. Hassan in 3 weeks for wound check.   Lovenox for DVT ppx during stay. Aspirin upon discharge.      The above findings, diagnostics, and treatment plan were discussed with Dr. Hassan who is in agreement with the plan of care except as stated in additional documentation.      Katie Grider PA-C  Orthopedic Trauma Surgery  [facility]

## 2022-07-29 NOTE — PLAN OF CARE
There is no home health who would be able to come daily for wound care. My understanding is that due to her likelihood of non compliance with this care dr wanted home health nurse to do the daily dressings.  Since there is no agency that will do this on a daily basis pt would like to see if she can get a skilled bed at [facility] in [facility]. This is by her home. She has [facility] dual complete. Communication sent to [facility].

## 2022-07-29 NOTE — NURSING
Discussed dressing change orders with assigned nurse Ros JONES, who reports new orders , wound care provided and dressings changed to RLE, findings reported to Ros JONES, patient tolerated procedure well.

## 2022-07-29 NOTE — PROGRESS NOTES
[facility]  Hospital Medicine Progress Note        Chief Complaint: Inpatient Follow-up for medical mgmt    HPI:    62 y.o. female with PMHx COPD, chronic hepatitis C treated and cured, anxiety, Stage 1B NSCLC - Adenocarcinoma, RUL, s/p Right upper pulmonary lobectomy & mediastinal lymph node dissection on 6/2/17. She was admitted to [facility] on 7/28/2022 for a scheduled elective procedure. Of note, patient initially fell and sustained right tibia/fibula fx on 2/21/21, she underwent ex fix on 3/9/21 followed by ORIF on 3/23/21. Patient reportedly started having some intermittent drainage from her surgical incision over the past few months. She was seen at Urgent Care x1 week ago and given oral Clindamycin. Reported she was unable to ambulate over the past few weeks, and saw Ortho on 7/25/22, was noted to have a blister over the medial wound that has since opened and was draining. She presented to [facility] on 7/28/22 and underwent I&D right ankle with hardware removal by Dr. Hassan. Hospital Medicine team was consulted for medical management.    Interval Hx:  Patient doing well.  No current complaints.  Pain controlled.     Objective/physical exam:  General: In no acute distress, afebrile  Chest: Clear to auscultation bilaterally  Heart: RRR, +S1, S2, no appreciable murmur  Abdomen: Soft, nontender, BS +  MSK: Warm, no lower extremity edema, no clubbing or cyanosis  Neurologic: Alert and oriented x4, Cranial nerve II-XII intact, Strength 5/5 in all 4 extremities    VITAL SIGNS: 24 HRS MIN & MAX LAST   Temp  Min: 97.5 °F (36.4 °C)  Max: 99.3 °F (37.4 °C) 98.6 °F (37 °C)   BP  Min: 110/78  Max: 173/98 123/83   Pulse  Min: 65  Max: 94  86   Resp  Min: 13  Max: 20 18   SpO2  Min: 95 %  Max: 100 % 99 %       Recent Labs   Lab 07/28/22  0551 07/29/22  0459   WBC 12.5* 12.6*   RBC 4.48 3.85*   HGB 13.0 11.0*   HCT 42.2 36.0*   MCV 94.2* 93.5   MCH 29.0 28.6   MCHC 30.8* 30.6*   RDW 14.6 14.5     209   MPV 10.5* 10.8*       Recent Labs   Lab 07/28/22  0551 07/29/22  0459    139   K 4.3 4.4   CO2 19* 24   BUN 8.7* 5.9*   CREATININE 0.71 0.67   CALCIUM 9.3 8.4   ALBUMIN  --  2.8*   ALKPHOS  --  51   ALT  --  10   AST  --  16   BILITOT  --  0.3          Microbiology Results (last 7 days)     ** No results found for the last 168 hours. **           See below for Radiology    Scheduled Med:   aspirin  81 mg Oral Daily    ceFAZolin (ANCEF) IVPB  2 g Intravenous Q8H    docusate sodium  100 mg Oral Daily    enoxaparin  40 mg Subcutaneous Daily    fluticasone furoate-vilanteroL  1 puff Inhalation Daily    isosorbide mononitrate  30 mg Oral Daily    methocarbamoL  750 mg Oral TID    traZODone  50 mg Oral Nightly        Continuous Infusions:       PRN Meds:  albuterol, albuterol-ipratropium, bisacodyL, famotidine, magnesium hydroxide 400 mg/5 ml, morphine, ondansetron, ondansetron, ondansetron, oxyCODONE, oxyCODONE, senna, sodium chloride 0.9%       Assessment/Plan:   Chronic R Ankle Wound s/p I&D and removal of hardware on 7/28/22  Hx of Closed, displaced R tib/fib fx s/p ORIF on 3/23/21  Leukocytosis 2/2 above  Metabolic Acidosis   Anxiety  Hx of COPD, chronic hepatitis C treated and cured, Stage 1B NSCLC - Adenocarcinoma, RUL, s/p Right upper pulmonary lobectomy & mediastinal lymph node dissection on 6/2/17 in remission     Patient remain nonweightbearing to the right lower extremity per Orthopedics for least 6-8 weeks.  Discussed with her that she may need some placement.  She does have a wheelchair at home but difficulty with transfers.  Case Management consulted and following along  Blood pressure within normal range.  Good oxygenation.  Vitals are stable.  Labs unrevealing.  Metabolic acidosis has resolved.    Still a mild leukocytosis.  She is on Ancef her Ortho recommendations.  Could potentially be switched to oral if they want to continue antibiotics.  Will continue to follow along with  you    Patient condition:  Stable    Anticipated discharge and Disposition: TBD      All diagnosis and differential diagnosis have been reviewed; assessment and plan has been documented; I have personally reviewed the labs and test results that are presently available; I have reviewed the patients medication list; I have reviewed the consulting providers response and recommendations. I have reviewed or attempted to review medical records based upon their availability    All of the patient's questions have been  addressed and answered. Patient's is agreeable to the above stated plan. I will continue to monitor closely and make adjustments to medical management as needed.  _____________________________________________________________________      Radiology:  SURG FL Surgery Fluoro Usage  See OP Notes for results.     IMPRESSION: See OP Notes for results.     This procedure was auto-finalized by: Virtual Radiologist  X-Ray Chest PA And Lateral  Narrative: EXAMINATION:  XR CHEST PA AND LATERAL    CLINICAL HISTORY:  pre op;    TECHNIQUE:  Two views    COMPARISON:  June 9, 2007.    FINDINGS:  Cardiopericardial silhouette is within normal limits.  Lungs emphysematous changes without dense focal or segmental consolidation, congestion, pleural effusion or pneumothorax.  Impression: No acute cardiopulmonary process identified.    Electronically signed by: Quirino Dimas  Date:    07/28/2022  Time:    07:21      Marcin Marti MD   07/29/2022

## 2022-07-30 LAB
ALBUMIN SERPL-MCNC: 2.7 GM/DL (ref 3.4–4.8)
ALBUMIN/GLOB SERPL: 1 RATIO (ref 1.1–2)
ALP SERPL-CCNC: 57 UNIT/L (ref 40–150)
ALT SERPL-CCNC: 16 UNIT/L (ref 0–55)
AST SERPL-CCNC: 48 UNIT/L (ref 5–34)
BASOPHILS # BLD AUTO: 0.08 X10(3)/MCL (ref 0–0.2)
BASOPHILS NFR BLD AUTO: 1 %
BILIRUBIN DIRECT+TOT PNL SERPL-MCNC: 0.3 MG/DL
BUN SERPL-MCNC: 4.2 MG/DL (ref 9.8–20.1)
CALCIUM SERPL-MCNC: 8.6 MG/DL (ref 8.4–10.2)
CHLORIDE SERPL-SCNC: 107 MMOL/L (ref 98–107)
CO2 SERPL-SCNC: 25 MMOL/L (ref 23–31)
CREAT SERPL-MCNC: 0.7 MG/DL (ref 0.55–1.02)
EOSINOPHIL # BLD AUTO: 0.19 X10(3)/MCL (ref 0–0.9)
EOSINOPHIL NFR BLD AUTO: 2.3 %
ERYTHROCYTE [DISTWIDTH] IN BLOOD BY AUTOMATED COUNT: 14.5 % (ref 11.5–17)
GLOBULIN SER-MCNC: 2.7 GM/DL (ref 2.4–3.5)
GLUCOSE SERPL-MCNC: 116 MG/DL (ref 82–115)
HCT VFR BLD AUTO: 34.6 % (ref 37–47)
HGB BLD-MCNC: 10.6 GM/DL (ref 12–16)
IMM GRANULOCYTES # BLD AUTO: 0.05 X10(3)/MCL (ref 0–0.04)
IMM GRANULOCYTES NFR BLD AUTO: 0.6 %
LYMPHOCYTES # BLD AUTO: 1.63 X10(3)/MCL (ref 0.6–4.6)
LYMPHOCYTES NFR BLD AUTO: 19.9 %
MCH RBC QN AUTO: 29 PG (ref 27–31)
MCHC RBC AUTO-ENTMCNC: 30.6 MG/DL (ref 33–36)
MCV RBC AUTO: 94.5 FL (ref 80–94)
MONOCYTES # BLD AUTO: 1 X10(3)/MCL (ref 0.1–1.3)
MONOCYTES NFR BLD AUTO: 12.2 %
NEUTROPHILS # BLD AUTO: 5.3 X10(3)/MCL (ref 2.1–9.2)
NEUTROPHILS NFR BLD AUTO: 64 %
NRBC BLD AUTO-RTO: 0 %
PLATELET # BLD AUTO: 186 X10(3)/MCL (ref 130–400)
PMV BLD AUTO: 10.5 FL (ref 7.4–10.4)
POTASSIUM SERPL-SCNC: 4.3 MMOL/L (ref 3.5–5.1)
PROT SERPL-MCNC: 5.4 GM/DL (ref 5.8–7.6)
RBC # BLD AUTO: 3.66 X10(6)/MCL (ref 4.2–5.4)
SODIUM SERPL-SCNC: 141 MMOL/L (ref 136–145)
WBC # SPEC AUTO: 8.2 X10(3)/MCL (ref 4.5–11.5)

## 2022-07-30 PROCEDURE — 96372 THER/PROPH/DIAG INJ SC/IM: CPT | Performed by: PHYSICIAN ASSISTANT

## 2022-07-30 PROCEDURE — 63600175 PHARM REV CODE 636 W HCPCS: Performed by: PHYSICIAN ASSISTANT

## 2022-07-30 PROCEDURE — 25000003 PHARM REV CODE 250: Performed by: PHYSICIAN ASSISTANT

## 2022-07-30 PROCEDURE — 85025 COMPLETE CBC W/AUTO DIFF WBC: CPT | Performed by: PHYSICIAN ASSISTANT

## 2022-07-30 PROCEDURE — 97116 GAIT TRAINING THERAPY: CPT | Mod: CQ

## 2022-07-30 PROCEDURE — 97110 THERAPEUTIC EXERCISES: CPT | Mod: CQ

## 2022-07-30 PROCEDURE — 96366 THER/PROPH/DIAG IV INF ADDON: CPT

## 2022-07-30 PROCEDURE — 36415 COLL VENOUS BLD VENIPUNCTURE: CPT | Performed by: PHYSICIAN ASSISTANT

## 2022-07-30 PROCEDURE — 80053 COMPREHEN METABOLIC PANEL: CPT | Performed by: PHYSICIAN ASSISTANT

## 2022-07-30 PROCEDURE — 96375 TX/PRO/DX INJ NEW DRUG ADDON: CPT

## 2022-07-30 PROCEDURE — G0378 HOSPITAL OBSERVATION PER HR: HCPCS

## 2022-07-30 RX ADMIN — DOCUSATE SODIUM 100 MG: 50 CAPSULE, LIQUID FILLED ORAL at 08:07

## 2022-07-30 RX ADMIN — CEFAZOLIN SODIUM 2 G: 2 SOLUTION INTRAVENOUS at 02:07

## 2022-07-30 RX ADMIN — ISOSORBIDE MONONITRATE 30 MG: 30 TABLET, EXTENDED RELEASE ORAL at 08:07

## 2022-07-30 RX ADMIN — CEFAZOLIN SODIUM 2 G: 2 SOLUTION INTRAVENOUS at 08:07

## 2022-07-30 RX ADMIN — OXYCODONE 10 MG: 5 TABLET ORAL at 08:07

## 2022-07-30 RX ADMIN — METHOCARBAMOL 750 MG: 750 TABLET ORAL at 05:07

## 2022-07-30 RX ADMIN — ENOXAPARIN SODIUM 40 MG: 40 INJECTION SUBCUTANEOUS at 04:07

## 2022-07-30 RX ADMIN — OXYCODONE 10 MG: 5 TABLET ORAL at 11:07

## 2022-07-30 RX ADMIN — FLUTICASONE FUROATE AND VILANTEROL TRIFENATATE 1 PUFF: 100; 25 POWDER RESPIRATORY (INHALATION) at 08:07

## 2022-07-30 RX ADMIN — MORPHINE SULFATE 4 MG: 10 INJECTION INTRAVENOUS at 08:07

## 2022-07-30 RX ADMIN — METHOCARBAMOL 750 MG: 750 TABLET ORAL at 11:07

## 2022-07-30 RX ADMIN — ASPIRIN 81 MG: 81 TABLET, COATED ORAL at 08:07

## 2022-07-30 RX ADMIN — OXYCODONE 10 MG: 5 TABLET ORAL at 04:07

## 2022-07-30 RX ADMIN — TRAZODONE HYDROCHLORIDE 50 MG: 50 TABLET ORAL at 08:07

## 2022-07-30 RX ADMIN — OXYCODONE 10 MG: 5 TABLET ORAL at 12:07

## 2022-07-30 RX ADMIN — METHOCARBAMOL 750 MG: 750 TABLET ORAL at 02:07

## 2022-07-30 RX ADMIN — CEFAZOLIN SODIUM 2 G: 2 SOLUTION INTRAVENOUS at 11:07

## 2022-07-30 NOTE — PROGRESS NOTES
[facility]  Hospital Medicine Progress Note        Chief Complaint: Inpatient Follow-up for  medical mgmt     HPI:    62 y.o. female with PMHx COPD, chronic hepatitis C treated and cured, anxiety, Stage 1B NSCLC - Adenocarcinoma, RUL, s/p Right upper pulmonary lobectomy & mediastinal lymph node dissection on 6/2/17. She was admitted to [facility] on 7/28/2022 for a scheduled elective procedure. Of note, patient initially fell and sustained right tibia/fibula fx on 2/21/21, she underwent ex fix on 3/9/21 followed by ORIF on 3/23/21. Patient reportedly started having some intermittent drainage from her surgical incision over the past few months. She was seen at Urgent Care x1 week ago and given oral Clindamycin. Reported she was unable to ambulate over the past few weeks, and saw Ortho on 7/25/22, was noted to have a blister over the medial wound that has since opened and was draining. She presented to [facility] on 7/28/22 and underwent I&D right ankle with hardware removal by Dr. Hassan. Hospital Medicine team was consulted for medical management.     Interval Hx:  No changes overnight. BP controlled.  at bedside.       Objective/physical exam:  General: In no acute distress, afebrile  Chest: Clear to auscultation bilaterally  Heart: RRR, +S1, S2, no appreciable murmur  Abdomen: Soft, nontender, BS +  MSK: Warm, no lower extremity edema, no clubbing or cyanosis  Neurologic: Alert and oriented x4, Cranial nerve II-XII intact, Strength 5/5 in all 4 extremities    VITAL SIGNS: 24 HRS MIN & MAX LAST   Temp  Min: 97.5 °F (36.4 °C)  Max: 98.6 °F (37 °C) 98.6 °F (37 °C)   BP  Min: 116/77  Max: 132/74 116/77   Pulse  Min: 82  Max: 94  82   Resp  Min: 17  Max: 20 18   SpO2  Min: 96 %  Max: 99 % 97 %       Recent Labs   Lab 07/28/22  0551 07/29/22  0459 07/30/22  0432   WBC 12.5* 12.6* 8.2   RBC 4.48 3.85* 3.66*   HGB 13.0 11.0* 10.6*   HCT 42.2 36.0* 34.6*   MCV 94.2* 93.5 94.5*   MCH 29.0 28.6 29.0    MCHC 30.8* 30.6* 30.6*   RDW 14.6 14.5 14.5    209 186   MPV 10.5* 10.8* 10.5*       Recent Labs   Lab 07/28/22  0551 07/29/22  0459 07/30/22  0432    139 141   K 4.3 4.4 4.3   CO2 19* 24 25   BUN 8.7* 5.9* 4.2*   CREATININE 0.71 0.67 0.70   CALCIUM 9.3 8.4 8.6   ALBUMIN  --  2.8* 2.7*   ALKPHOS  --  51 57   ALT  --  10 16   AST  --  16 48*   BILITOT  --  0.3 0.3          Microbiology Results (last 7 days)     ** No results found for the last 168 hours. **           See below for Radiology    Scheduled Med:   aspirin  81 mg Oral Daily    ceFAZolin (ANCEF) IVPB  2 g Intravenous Q8H    docusate sodium  100 mg Oral Daily    enoxaparin  40 mg Subcutaneous Daily    fluticasone furoate-vilanteroL  1 puff Inhalation Daily    isosorbide mononitrate  30 mg Oral Daily    methocarbamoL  750 mg Oral TID    traZODone  50 mg Oral Nightly        Continuous Infusions:       PRN Meds:  albuterol, albuterol-ipratropium, bisacodyL, famotidine, magnesium hydroxide 400 mg/5 ml, morphine, ondansetron, ondansetron, ondansetron, oxyCODONE, oxyCODONE, senna, sodium chloride 0.9%       Assessment/Plan:   Chronic R Ankle Wound s/p I&D and removal of hardware on 7/28/22  Hx of Closed, displaced R tib/fib fx s/p ORIF on 3/23/21  Leukocytosis 2/2 above  Metabolic Acidosis   Anxiety  Hx of COPD, chronic hepatitis C treated and cured, Stage 1B NSCLC - Adenocarcinoma, RUL, s/p Right upper pulmonary lobectomy & mediastinal lymph node dissection on 6/2/17 in remission     Patient remain nonweightbearing to the right lower extremity per Orthopedics for least 6-8 weeks.  CM working on placement at SNF  Labs stable. Leukocytosis resolved.   Remains on Ancef for now, can likely transition to oral agent or discontinue today but will defer to ortho. No culture data available  Blood pressure within normal range.  Good oxygenation.  Vitals are stable.    Patient condition:  Stable    Anticipated discharge and Disposition: [facility]      All  diagnosis and differential diagnosis have been reviewed; assessment and plan has been documented; I have personally reviewed the labs and test results that are presently available; I have reviewed the patients medication list; I have reviewed the consulting providers response and recommendations. I have reviewed or attempted to review medical records based upon their availability    All of the patient's questions have been  addressed and answered. Patient's is agreeable to the above stated plan. I will continue to monitor closely and make adjustments to medical management as needed.  _____________________________________________________________________      Radiology:  SURG FL Surgery Fluoro Usage  See OP Notes for results.     IMPRESSION: See OP Notes for results.     This procedure was auto-finalized by: Virtual Radiologist  X-Ray Chest PA And Lateral  Narrative: EXAMINATION:  XR CHEST PA AND LATERAL    CLINICAL HISTORY:  pre op;    TECHNIQUE:  Two views    COMPARISON:  June 9, 2007.    FINDINGS:  Cardiopericardial silhouette is within normal limits.  Lungs emphysematous changes without dense focal or segmental consolidation, congestion, pleural effusion or pneumothorax.  Impression: No acute cardiopulmonary process identified.    Electronically signed by: Quirino Dimas  Date:    07/28/2022  Time:    07:21      Marcin Marti MD   07/30/2022

## 2022-07-30 NOTE — PROGRESS NOTES
[facility] Neuro  Orthopedics  Progress Note    Patient Name: Lauren Ring  MRN: [id number]  Admission Date: 7/28/2022  Hospital Length of Stay: 2 days  Attending Provider: Javan Hassan DO  Primary Care Provider: WINNIE Luu  Follow-up For: Procedure(s) (LRB):  INCISION AND DRAINAGE, LOWER EXTREMITY (Right)    Post-Operative Day: 2 Day Post-Op  Subjective:     Principal Problem:<principal problem not specified>    Principal Orthopedic Problem: 1 Day Post-Op   Hardware removal with I&d to right lower ext    Interval History: patient resting comfortably. Pain is well controlled this morning. NO acute events overnight. Some questions regarding discharge plan. Was able to mobilize some with therapy yesterday evening per her review. Would like to go home if possible but will need daily wound care visit.     Review of patient's allergies indicates:   Allergen Reactions    Codeine     Tetanus toxoid        Current Facility-Administered Medications   Medication    albuterol inhaler 2 puff    albuterol-ipratropium 2.5 mg-0.5 mg/3 mL nebulizer solution 3 mL    aspirin EC tablet 81 mg    bisacodyL suppository 10 mg    cefazolin (ANCEF) 2 gram in dextrose 5% 50 mL IVPB (premix)    docusate sodium capsule 100 mg    enoxaparin injection 40 mg    famotidine tablet 20 mg    fluticasone furoate-vilanteroL 100-25 mcg/dose diskus inhaler 1 puff    isosorbide mononitrate 24 hr tablet 30 mg    magnesium hydroxide 400 mg/5 ml suspension 2,400 mg    methocarbamoL tablet 750 mg    morphine injection 4 mg    ondansetron disintegrating tablet 4 mg    ondansetron injection 4 mg    ondansetron injection 4 mg    oxyCODONE immediate release tablet 10 mg    oxyCODONE immediate release tablet 5 mg    senna tablet 8.6 mg    sodium chloride 0.9% flush 10 mL    traZODone tablet 50 mg     Objective:     Vital Signs (Most Recent):  Temp: 98.3 °F (36.8 °C) (07/30/22 0711)  Pulse: 84 (07/30/22  "0711)  Resp: 18 (07/30/22 0711)  BP: 126/76 (07/30/22 0711)  SpO2: 98 % (07/30/22 0711) Vital Signs (24h Range):  Temp:  [97.7 °F (36.5 °C)-98.6 °F (37 °C)] 98.3 °F (36.8 °C)  Pulse:  [82-91] 84  Resp:  [17-20] 18  SpO2:  [96 %-99 %] 98 %  BP: (116-132)/(74-79) 126/76     Weight: 57.2 kg (126 lb 1.7 oz)  Height: 5' 6" (167.6 cm)  Body mass index is 20.35 kg/m².      Intake/Output Summary (Last 24 hours) at 7/30/2022 0813  Last data filed at 7/30/2022 0500  Gross per 24 hour   Intake 240 ml   Output 2050 ml   Net -1810 ml       Physical Exam:   Musculoskeletal:       Right lower extremity:Dressing CDI without signs of drainage; compartments are soft and compressible;Tolerates passive ROM of the knee and ankle; appropriate tenderness to palpation; dorsi/plantar flexes the foot; SILT distally; BCR distally; DP pulse palpable        Diagnostic Findings:   Significant Labs:   Recent Lab Results  (Last 5 results in the past 72 hours)      07/30/22  0432   07/29/22  2117   07/29/22  0459   07/28/22  0748   07/28/22  0551        Albumin/Globulin Ratio 1.0     1.0           Albumin 2.7     2.8           Alkaline Phosphatase 57     51           ALT 16     10           Anion Gap         9.0       AST 48     16           Baso # 0.08     0.06     0.11       Basophil % 1.0     0.5     0.9       BILIRUBIN TOTAL 0.3     0.3           BUN 4.2     5.9     8.7       BUN/CREAT RATIO         12       Calcium 8.6     8.4     9.3       Case Report       Surgical Pathology                                Case: [id number]                                 Authorizing Provider:  Javan Hassan DO           Collected:           07/28/2022 07:48 AM          Ordering Location:     [facility]  Received:            07/28/2022 03:24 PM                                 - Periop Services                                                            Pathologist:           Prudencio Tejada MD                                                        "      Specimen:    Foreign Body, Specify Object, explanted hardware R ankle                                        Chloride 107     107     111       CO2 25     24     19       Creatinine 0.70     0.67     0.71       eGFR if non African American >60     >60     >60       Eos # 0.19     0.05     0.19       Eosinophil % 2.3     0.4     1.5       Final Diagnosis       This result contains rich text formatting which cannot be displayed here.         Globulin, Total 2.7     2.9           Glucose 116     113     106       Hematocrit 34.6     36.0     42.2       Hemoglobin 10.6     11.0     13.0       Immature Grans (Abs) 0.05     0.05     0.10       Immature Granulocytes 0.6     0.4     0.8       INR         1.01       Clinical Information       This result contains rich text formatting which cannot be displayed here.         Gross Description       This result contains rich text formatting which cannot be displayed here.         Microscopic Description       This result contains rich text formatting which cannot be displayed here.         Report Footnotes       This result contains rich text formatting which cannot be displayed here.         Lymph # 1.63     2.22     2.79       LYMPH % 19.9     17.6     22.3       MCH 29.0     28.6     29.0       MCHC 30.6     30.6     30.8       MCV 94.5     93.5     94.2       Mono # 1.00     1.57     1.48       Mono % 12.2     12.5     11.8       MPV 10.5     10.8     10.5       Neut # 5.3     8.6     7.8       Neut % 64.0     68.6     62.7       nRBC 0.0     0.0     0.0       Platelets 186     209     232       POCT Glucose   112             Potassium 4.3     4.4     4.3       PROTEIN TOTAL 5.4     5.7           Protime         13.2       RBC 3.66     3.85     4.48       RDW 14.5     14.5     14.6       Sodium 141     139     139       WBC 8.2     12.6     12.5                             Significant Imaging: I have reviewed and interpreted all pertinent imaging results/findings.      Assessment/Plan:     There are no hospital problems to display for this patient.  patient s/p hardware removal and I&D of draining sinus tract to the right ankle.   Area of tissue loss will need daily wound care. Wound care team consulted to evaluate and treat. Likely to need daily wound care which the patient may benefit from a short rehab stay until she progresses to less frequent wound care which would be attainable as an outpatient status.   Remain NWB to the RLE for approx 3 weeks until followup. Daily dry dressing changes beginning tomorrow unless wound care is able to evaluate sooner.  Plan for DC home with home health vs to rehab likely Monday.  Follow up with Dr. Hassan in 3 weeks for wound check.   Lovenox for DVT ppx during stay. Aspirin upon discharge.      The above findings, diagnostics, and treatment plan were discussed with Dr. Solares who is in agreement with the plan of care except as stated in additional documentation.      MARC Evans-KAI  Orthopedic Surgery  [facility]

## 2022-07-30 NOTE — PLAN OF CARE
Problem: Adult Inpatient Plan of Care  Goal: Patient-Specific Goal (Individualized)  Outcome: Ongoing, Progressing  Flowsheets (Taken 7/29/2022 2136)  Patient-Specific Goals (Include Timeframe): to sleep good tonight     Problem: Infection  Goal: Absence of Infection Signs and Symptoms  Outcome: Ongoing, Progressing     Problem: Impaired Wound Healing  Goal: Optimal Wound Healing  Outcome: Ongoing, Progressing

## 2022-07-30 NOTE — PT/OT/SLP PROGRESS
Physical Therapy Treatment    Patient Name:  Lauren Ring   MRN:  [id number]    Recommendations:     Discharge Recommendations:  home health PT, rehabilitation facility, other (see comments) (pending progress)   Discharge Equipment Recommendations:       Subjective     Patient awake and alert.     Objective:     General Precautions: Standard,     Orthopedic Precautions:RLE non weight bearing   Braces:    Respiratory Status: Nasal cannula, flow 2 L/min     Communicated with nurse prior to treatment.     Functional Mobility:    Rolling:Modified Independent  Supine to sit:Modified Independent  Sit to stand transfer: Minimal Assistance  Bed to chair transfer:Minimal Assistance  Gait 20 ft x 2 and 10 ft x 1 with RW NWB RLE. Pt performed LE PRE's to increase strenth, ROM, and endurance to improve overall independence.  Pt ambulated to BR and voided in toilet. LE elevated after session.       AM-PAC 6 CLICK MOBILITY        Patient left supine with call button in reach..    GOALS:   Multidisciplinary Problems     Physical Therapy Goals        Problem: Physical Therapy    Goal Priority Disciplines Outcome Goal Variances Interventions   Physical Therapy Goal     PT, PT/OT Ongoing, Progressing     Description: Goals to be met by: 22    Patient will increase functional independence with mobility by performin. Sit to stand transfer with Modified [place], NWB R LE  2. Bed to chair transfer with Modified [place] using Rolling Walker, NWB R LE  3. Gait  x 150 feet with Modified [place] using Rolling Walker, NWB R LE                     Assessment:     Lauren Ring is a 62 y.o. female admitted with a medical diagnosis of <principal problem not specified>.     Rehab Prognosis: Good; patient would benefit from acute skilled PT services to address these deficits and reach maximum level of function.    Recent Surgery: Procedure(s) (LRB):  INCISION AND DRAINAGE, LOWER EXTREMITY (Right) 2 Days Post-Op    Plan:      During this hospitalization, patient to be seen daily (QD to BID) to address the identified rehab impairments via gait training, therapeutic activities, therapeutic exercises and progress toward the following goals:    · Plan of Care Expires:  08/28/22    Billable Minutes     Billable Minutes: Gait Training 12 and Therapeutic Exercise 12    Treatment Type: Treatment  PT/PTA: PTA     PTA Visit Number: 2     07/30/2022

## 2022-07-31 LAB
ALBUMIN SERPL-MCNC: 2.6 GM/DL (ref 3.4–4.8)
ALBUMIN/GLOB SERPL: 1 RATIO (ref 1.1–2)
ALP SERPL-CCNC: 56 UNIT/L (ref 40–150)
ALT SERPL-CCNC: 11 UNIT/L (ref 0–55)
AST SERPL-CCNC: 20 UNIT/L (ref 5–34)
BASOPHILS # BLD AUTO: 0.08 X10(3)/MCL (ref 0–0.2)
BASOPHILS NFR BLD AUTO: 0.9 %
BILIRUBIN DIRECT+TOT PNL SERPL-MCNC: 0.3 MG/DL
BUN SERPL-MCNC: 4.6 MG/DL (ref 9.8–20.1)
CALCIUM SERPL-MCNC: 8.4 MG/DL (ref 8.4–10.2)
CHLORIDE SERPL-SCNC: 108 MMOL/L (ref 98–107)
CO2 SERPL-SCNC: 25 MMOL/L (ref 23–31)
CREAT SERPL-MCNC: 0.69 MG/DL (ref 0.55–1.02)
EOSINOPHIL # BLD AUTO: 0.28 X10(3)/MCL (ref 0–0.9)
EOSINOPHIL NFR BLD AUTO: 3 %
ERYTHROCYTE [DISTWIDTH] IN BLOOD BY AUTOMATED COUNT: 14.3 % (ref 11.5–17)
GLOBULIN SER-MCNC: 2.7 GM/DL (ref 2.4–3.5)
GLUCOSE SERPL-MCNC: 107 MG/DL (ref 82–115)
HCT VFR BLD AUTO: 31.4 % (ref 37–47)
HGB BLD-MCNC: 9.8 GM/DL (ref 12–16)
IMM GRANULOCYTES # BLD AUTO: 0.04 X10(3)/MCL (ref 0–0.04)
IMM GRANULOCYTES NFR BLD AUTO: 0.4 %
LYMPHOCYTES # BLD AUTO: 2.08 X10(3)/MCL (ref 0.6–4.6)
LYMPHOCYTES NFR BLD AUTO: 22.6 %
MCH RBC QN AUTO: 28.9 PG (ref 27–31)
MCHC RBC AUTO-ENTMCNC: 31.2 MG/DL (ref 33–36)
MCV RBC AUTO: 92.6 FL (ref 80–94)
MONOCYTES # BLD AUTO: 1.16 X10(3)/MCL (ref 0.1–1.3)
MONOCYTES NFR BLD AUTO: 12.6 %
NEUTROPHILS # BLD AUTO: 5.6 X10(3)/MCL (ref 2.1–9.2)
NEUTROPHILS NFR BLD AUTO: 60.5 %
NRBC BLD AUTO-RTO: 0 %
PLATELET # BLD AUTO: 186 X10(3)/MCL (ref 130–400)
PMV BLD AUTO: 10.5 FL (ref 7.4–10.4)
POTASSIUM SERPL-SCNC: 4.2 MMOL/L (ref 3.5–5.1)
PROT SERPL-MCNC: 5.3 GM/DL (ref 5.8–7.6)
RBC # BLD AUTO: 3.39 X10(6)/MCL (ref 4.2–5.4)
SODIUM SERPL-SCNC: 140 MMOL/L (ref 136–145)
WBC # SPEC AUTO: 9.2 X10(3)/MCL (ref 4.5–11.5)

## 2022-07-31 PROCEDURE — 63600175 PHARM REV CODE 636 W HCPCS: Performed by: PHYSICIAN ASSISTANT

## 2022-07-31 PROCEDURE — 36415 COLL VENOUS BLD VENIPUNCTURE: CPT | Performed by: PHYSICIAN ASSISTANT

## 2022-07-31 PROCEDURE — 25000003 PHARM REV CODE 250: Performed by: PHYSICIAN ASSISTANT

## 2022-07-31 PROCEDURE — G0378 HOSPITAL OBSERVATION PER HR: HCPCS

## 2022-07-31 PROCEDURE — 96372 THER/PROPH/DIAG INJ SC/IM: CPT | Performed by: PHYSICIAN ASSISTANT

## 2022-07-31 PROCEDURE — 96366 THER/PROPH/DIAG IV INF ADDON: CPT

## 2022-07-31 PROCEDURE — 80053 COMPREHEN METABOLIC PANEL: CPT | Performed by: PHYSICIAN ASSISTANT

## 2022-07-31 PROCEDURE — 85025 COMPLETE CBC W/AUTO DIFF WBC: CPT | Performed by: PHYSICIAN ASSISTANT

## 2022-07-31 PROCEDURE — 25000003 PHARM REV CODE 250: Performed by: INTERNAL MEDICINE

## 2022-07-31 RX ORDER — DIPHENHYDRAMINE HCL 25 MG
25 CAPSULE ORAL NIGHTLY PRN
Status: DISCONTINUED | OUTPATIENT
Start: 2022-07-31 | End: 2022-08-02 | Stop reason: HOSPADM

## 2022-07-31 RX ADMIN — ASPIRIN 81 MG: 81 TABLET, COATED ORAL at 09:07

## 2022-07-31 RX ADMIN — ENOXAPARIN SODIUM 40 MG: 40 INJECTION SUBCUTANEOUS at 03:07

## 2022-07-31 RX ADMIN — OXYCODONE 10 MG: 5 TABLET ORAL at 05:07

## 2022-07-31 RX ADMIN — ISOSORBIDE MONONITRATE 30 MG: 30 TABLET, EXTENDED RELEASE ORAL at 09:07

## 2022-07-31 RX ADMIN — METHOCARBAMOL 750 MG: 750 TABLET ORAL at 09:07

## 2022-07-31 RX ADMIN — CEFAZOLIN SODIUM 2 G: 2 SOLUTION INTRAVENOUS at 02:07

## 2022-07-31 RX ADMIN — OXYCODONE 10 MG: 5 TABLET ORAL at 09:07

## 2022-07-31 RX ADMIN — METHOCARBAMOL 750 MG: 750 TABLET ORAL at 05:07

## 2022-07-31 RX ADMIN — DIPHENHYDRAMINE HYDROCHLORIDE 25 MG: 25 CAPSULE ORAL at 09:07

## 2022-07-31 RX ADMIN — DOCUSATE SODIUM 100 MG: 50 CAPSULE, LIQUID FILLED ORAL at 09:07

## 2022-07-31 RX ADMIN — OXYCODONE 10 MG: 5 TABLET ORAL at 01:07

## 2022-07-31 RX ADMIN — CEFAZOLIN SODIUM 2 G: 2 SOLUTION INTRAVENOUS at 09:07

## 2022-07-31 RX ADMIN — TRAZODONE HYDROCHLORIDE 50 MG: 50 TABLET ORAL at 08:07

## 2022-07-31 RX ADMIN — FLUTICASONE FUROATE AND VILANTEROL TRIFENATATE 1 PUFF: 100; 25 POWDER RESPIRATORY (INHALATION) at 09:07

## 2022-07-31 RX ADMIN — METHOCARBAMOL 750 MG: 750 TABLET ORAL at 01:07

## 2022-07-31 RX ADMIN — OXYCODONE 10 MG: 5 TABLET ORAL at 04:07

## 2022-07-31 NOTE — PROGRESS NOTES
[facility] Neuro  Orthopedics  Progress Note    Patient Name: Lauren Ring  MRN: [id number]  Admission Date: 7/28/2022  Hospital Length of Stay: 2 days  Attending Provider: Javan Hassan DO  Primary Care Provider: WINNIE Luu  Follow-up For: Procedure(s) (LRB):  INCISION AND DRAINAGE, LOWER EXTREMITY (Right)    Post-Operative Day: 3 Day Post-Op  Subjective:     Principal Problem:<principal problem not specified>    Principal Orthopedic Problem: 3 Day Post-Op   Hardware removal with I&d to right lower ext    Interval History: patient resting comfortably with right leg elevated. Pain is well controlled this morning. NO acute events overnight. States rested well last night    Review of patient's allergies indicates:   Allergen Reactions    Codeine     Tetanus toxoid        Current Facility-Administered Medications   Medication    albuterol inhaler 2 puff    albuterol-ipratropium 2.5 mg-0.5 mg/3 mL nebulizer solution 3 mL    aspirin EC tablet 81 mg    bisacodyL suppository 10 mg    cefazolin (ANCEF) 2 gram in dextrose 5% 50 mL IVPB (premix)    docusate sodium capsule 100 mg    enoxaparin injection 40 mg    famotidine tablet 20 mg    fluticasone furoate-vilanteroL 100-25 mcg/dose diskus inhaler 1 puff    isosorbide mononitrate 24 hr tablet 30 mg    magnesium hydroxide 400 mg/5 ml suspension 2,400 mg    methocarbamoL tablet 750 mg    morphine injection 4 mg    ondansetron disintegrating tablet 4 mg    ondansetron injection 4 mg    ondansetron injection 4 mg    oxyCODONE immediate release tablet 10 mg    oxyCODONE immediate release tablet 5 mg    senna tablet 8.6 mg    sodium chloride 0.9% flush 10 mL    traZODone tablet 50 mg     Objective:     Vital Signs (Most Recent):  Temp: 98.5 °F (36.9 °C) (07/31/22 0710)  Pulse: 80 (07/31/22 0710)  Resp: 18 (07/31/22 0710)  BP: 126/81 (07/31/22 0710)  SpO2: 96 % (07/31/22 0710) Vital Signs (24h Range):  Temp:  [98 °F (36.7  "°C)-99.1 °F (37.3 °C)] 98.5 °F (36.9 °C)  Pulse:  [] 80  Resp:  [16-19] 18  SpO2:  [93 %-98 %] 96 %  BP: ()/(65-81) 126/81     Weight: 57.2 kg (126 lb 1.7 oz)  Height: 5' 6" (167.6 cm)  Body mass index is 20.35 kg/m².      Intake/Output Summary (Last 24 hours) at 7/31/2022 0826  Last data filed at 7/31/2022 0600  Gross per 24 hour   Intake 520 ml   Output 1450 ml   Net -930 ml       Physical Exam:   Musculoskeletal:       Right lower extremity:Dressing CDI without signs of drainage; compartments are soft and compressible;Tolerates passive ROM of the knee and ankle; appropriate tenderness to palpation; dorsi/plantar flexes the foot; SILT distally; BCR distally; DP pulse palpable        Diagnostic Findings:   Significant Labs:   Recent Lab Results       07/31/22  0354   07/30/22  0432   07/29/22  2117   07/29/22  0459        Albumin/Globulin Ratio 1.0   1.0     1.0       Albumin 2.6   2.7     2.8       Alkaline Phosphatase 56   57     51       ALT 11   16     10       AST 20   48     16       Baso # 0.08   0.08     0.06       Basophil % 0.9   1.0     0.5       BILIRUBIN TOTAL 0.3   0.3     0.3       BUN 4.6   4.2     5.9       Calcium 8.4   8.6     8.4       Chloride 108   107     107       CO2 25   25     24       Creatinine 0.69   0.70     0.67       eGFR if non African American >60   >60     >60       Eos # 0.28   0.19     0.05       Eosinophil % 3.0   2.3     0.4       Globulin, Total 2.7   2.7     2.9       Glucose 107   116     113       Hematocrit 31.4   34.6     36.0       Hemoglobin 9.8   10.6     11.0       Immature Grans (Abs) 0.04   0.05     0.05       Immature Granulocytes 0.4   0.6     0.4       Lymph # 2.08   1.63     2.22       LYMPH % 22.6   19.9     17.6       MCH 28.9   29.0     28.6       MCHC 31.2   30.6     30.6       MCV 92.6   94.5     93.5       Mono # 1.16   1.00     1.57       Mono % 12.6   12.2     12.5       MPV 10.5   10.5     10.8       Neut # 5.6   5.3     8.6       Neut % " 60.5   64.0     68.6       nRBC 0.0   0.0     0.0       Platelets 186   186     209       POCT Glucose     112         Potassium 4.2   4.3     4.4       PROTEIN TOTAL 5.3   5.4     5.7       RBC 3.39   3.66     3.85       RDW 14.3   14.5     14.5       Sodium 140   141     139       WBC 9.2   8.2     12.6              Significant Imaging: I have reviewed and interpreted all pertinent imaging results/findings.     Assessment/Plan:     There are no hospital problems to display for this patient.  patient s/p hardware removal and I&D of draining sinus tract to the right ankle.   Area of tissue loss will need daily wound care. Wound care team consulted to evaluate and treat. Likely to need daily wound care which the patient may benefit from a short rehab stay until she progresses to less frequent wound care which would be attainable as an outpatient status.   Remain NWB to the RLE for approx 3 weeks until followup. Daily dry dressing changes beginning tomorrow unless wound care is able to evaluate sooner.  Plan for DC home with home health vs to rehab likely Monday.  Follow up with Dr. Hassan in 3 weeks for wound check.   Lovenox for DVT ppx during stay. Aspirin upon discharge.      The above findings, diagnostics, and treatment plan were discussed with Dr. Solares who is in agreement with the plan of care except as stated in additional documentation.      MARC Evans-KAI  Orthopedic Surgery  [facility]

## 2022-07-31 NOTE — PLAN OF CARE
Problem: Adult Inpatient Plan of Care  Goal: Patient-Specific Goal (Individualized)  Outcome: Ongoing, Progressing     Problem: Infection  Goal: Absence of Infection Signs and Symptoms  Outcome: Ongoing, Progressing     Problem: Skin Injury Risk Increased  Goal: Skin Health and Integrity  Outcome: Ongoing, Progressing

## 2022-07-31 NOTE — PROGRESS NOTES
[facility]  Hospital Medicine Progress Note        Chief Complaint: Inpatient Follow-up for COPD    HPI:   62 y.o. female with PMHx COPD, chronic hepatitis C treated and cured, anxiety, Stage 1B NSCLC - Adenocarcinoma, RUL, s/p Right upper pulmonary lobectomy & mediastinal lymph node dissection on 6/2/17. She was admitted to [facility] on 7/28/2022 for a scheduled elective procedure. Of note, patient initially fell and sustained right tibia/fibula fx on 2/21/21, she underwent ex fix on 3/9/21 followed by ORIF on 3/23/21. Patient reportedly started having some intermittent drainage from her surgical incision over the past few months. She was seen at Urgent Care x1 week ago and given oral Clindamycin. Reported she was unable to ambulate over the past few weeks, and saw Ortho on 7/25/22, was noted to have a blister over the medial wound that has since opened and was draining. She presented to [facility] on 7/28/22 and underwent I&D right ankle with hardware removal by Dr. Hassan. Hospital Medicine team was consulted for medical management.      Interval Hx:   C/o itching last night, but improved today. No acute changes.     Objective/physical exam:  General: In no acute distress, afebrile  Chest: Clear to auscultation bilaterally  Heart: RRR, +S1, S2, no appreciable murmur  Abdomen: Soft, nontender, BS +  MSK: Warm, no lower extremity edema, no clubbing or cyanosis  Neurologic: Alert and oriented x4, Cranial nerve II-XII intact, Strength 5/5 in all 4 extremities    VITAL SIGNS: 24 HRS MIN & MAX LAST   Temp  Min: 98 °F (36.7 °C)  Max: 99.1 °F (37.3 °C) 98.8 °F (37.1 °C)   BP  Min: 120/67  Max: 137/80 126/69   Pulse  Min: 78  Max: 99  93   Resp  Min: 16  Max: 19 18   SpO2  Min: 93 %  Max: 98 % 95 %       Recent Labs   Lab 07/29/22  0459 07/30/22  0432 07/31/22  0354   WBC 12.6* 8.2 9.2   RBC 3.85* 3.66* 3.39*   HGB 11.0* 10.6* 9.8*   HCT 36.0* 34.6* 31.4*   MCV 93.5 94.5* 92.6   MCH 28.6 29.0 28.9    MCHC 30.6* 30.6* 31.2*   RDW 14.5 14.5 14.3    186 186   MPV 10.8* 10.5* 10.5*       Recent Labs   Lab 07/29/22  0459 07/30/22  0432 07/31/22  0354    141 140   K 4.4 4.3 4.2   CO2 24 25 25   BUN 5.9* 4.2* 4.6*   CREATININE 0.67 0.70 0.69   CALCIUM 8.4 8.6 8.4   ALBUMIN 2.8* 2.7* 2.6*   ALKPHOS 51 57 56   ALT 10 16 11   AST 16 48* 20   BILITOT 0.3 0.3 0.3          Microbiology Results (last 7 days)     ** No results found for the last 168 hours. **           See below for Radiology    Scheduled Med:   aspirin  81 mg Oral Daily    ceFAZolin (ANCEF) IVPB  2 g Intravenous Q8H    docusate sodium  100 mg Oral Daily    enoxaparin  40 mg Subcutaneous Daily    fluticasone furoate-vilanteroL  1 puff Inhalation Daily    isosorbide mononitrate  30 mg Oral Daily    methocarbamoL  750 mg Oral TID    traZODone  50 mg Oral Nightly        Continuous Infusions:       PRN Meds:  albuterol, albuterol-ipratropium, bisacodyL, famotidine, magnesium hydroxide 400 mg/5 ml, morphine, ondansetron, ondansetron, ondansetron, oxyCODONE, oxyCODONE, senna, sodium chloride 0.9%       Assessment/Plan:  Chronic R Ankle Wound s/p I&D and removal of hardware on 7/28/22  Hx of Closed, displaced R tib/fib fx s/p ORIF on 3/23/21  Leukocytosis resolved   Metabolic Acidosis resolved  Anxiety  Hx of COPD, chronic hepatitis C treated and cured, Stage 1B NSCLC - Adenocarcinoma, RUL, s/p Right upper pulmonary lobectomy & mediastinal lymph node dissection on 6/2/17 in remission     Patient remains NWB to the right lower extremity per Orthopedics for least 6-8 weeks.  CM working on placement at SNF  Cont Inhaler   Labs/vitals  stable.   Remains on Ancef- per Ortho .       VTE prophylaxis: lovenox    Patient condition:  Stable  Anticipated discharge and Disposition:   TBD      All diagnosis and differential diagnosis have been reviewed; assessment and plan has been documented; I have personally reviewed the labs and test results that are  presently available; I have reviewed the patients medication list; I have reviewed the consulting providers response and recommendations. I have reviewed or attempted to review medical records based upon their availability    All of the patient's questions have been  addressed and answered. Patient's is agreeable to the above stated plan. I will continue to monitor closely and make adjustments to medical management as needed.  _____________________________________________________________________    Nutrition Status:    Radiology:  SURG FL Surgery Fluoro Usage  See OP Notes for results.     IMPRESSION: See OP Notes for results.     This procedure was auto-finalized by: Virtual Radiologist  X-Ray Chest PA And Lateral  Narrative: EXAMINATION:  XR CHEST PA AND LATERAL    CLINICAL HISTORY:  pre op;    TECHNIQUE:  Two views    COMPARISON:  June 9, 2007.    FINDINGS:  Cardiopericardial silhouette is within normal limits.  Lungs emphysematous changes without dense focal or segmental consolidation, congestion, pleural effusion or pneumothorax.  Impression: No acute cardiopulmonary process identified.    Electronically signed by: Quirino Dimas  Date:    07/28/2022  Time:    07:21      Rudi Eugene MD   07/31/2022

## 2022-08-01 PROCEDURE — 96366 THER/PROPH/DIAG IV INF ADDON: CPT

## 2022-08-01 PROCEDURE — 97116 GAIT TRAINING THERAPY: CPT | Mod: CQ

## 2022-08-01 PROCEDURE — 25000003 PHARM REV CODE 250: Performed by: PHYSICIAN ASSISTANT

## 2022-08-01 PROCEDURE — 96372 THER/PROPH/DIAG INJ SC/IM: CPT | Performed by: PHYSICIAN ASSISTANT

## 2022-08-01 PROCEDURE — 94761 N-INVAS EAR/PLS OXIMETRY MLT: CPT

## 2022-08-01 PROCEDURE — G0378 HOSPITAL OBSERVATION PER HR: HCPCS

## 2022-08-01 PROCEDURE — 63600175 PHARM REV CODE 636 W HCPCS: Performed by: PHYSICIAN ASSISTANT

## 2022-08-01 PROCEDURE — 25000003 PHARM REV CODE 250: Performed by: INTERNAL MEDICINE

## 2022-08-01 PROCEDURE — 27000221 HC OXYGEN, UP TO 24 HOURS

## 2022-08-01 PROCEDURE — 97530 THERAPEUTIC ACTIVITIES: CPT | Mod: CQ

## 2022-08-01 RX ADMIN — OXYCODONE 10 MG: 5 TABLET ORAL at 05:08

## 2022-08-01 RX ADMIN — OXYCODONE 10 MG: 5 TABLET ORAL at 01:08

## 2022-08-01 RX ADMIN — DOCUSATE SODIUM 100 MG: 50 CAPSULE, LIQUID FILLED ORAL at 08:08

## 2022-08-01 RX ADMIN — TRAZODONE HYDROCHLORIDE 50 MG: 50 TABLET ORAL at 08:08

## 2022-08-01 RX ADMIN — METHOCARBAMOL 750 MG: 750 TABLET ORAL at 02:08

## 2022-08-01 RX ADMIN — CEFAZOLIN SODIUM 2 G: 2 SOLUTION INTRAVENOUS at 08:08

## 2022-08-01 RX ADMIN — CEFAZOLIN SODIUM 2 G: 2 SOLUTION INTRAVENOUS at 09:08

## 2022-08-01 RX ADMIN — OXYCODONE 10 MG: 5 TABLET ORAL at 08:08

## 2022-08-01 RX ADMIN — ENOXAPARIN SODIUM 40 MG: 40 INJECTION SUBCUTANEOUS at 05:08

## 2022-08-01 RX ADMIN — METHOCARBAMOL 750 MG: 750 TABLET ORAL at 05:08

## 2022-08-01 RX ADMIN — CEFAZOLIN SODIUM 2 G: 2 SOLUTION INTRAVENOUS at 12:08

## 2022-08-01 RX ADMIN — DIPHENHYDRAMINE HYDROCHLORIDE 25 MG: 25 CAPSULE ORAL at 08:08

## 2022-08-01 RX ADMIN — METHOCARBAMOL 750 MG: 750 TABLET ORAL at 08:08

## 2022-08-01 RX ADMIN — FLUTICASONE FUROATE AND VILANTEROL TRIFENATATE 1 PUFF: 100; 25 POWDER RESPIRATORY (INHALATION) at 09:08

## 2022-08-01 RX ADMIN — CEFAZOLIN SODIUM 2 G: 2 SOLUTION INTRAVENOUS at 02:08

## 2022-08-01 RX ADMIN — ISOSORBIDE MONONITRATE 30 MG: 30 TABLET, EXTENDED RELEASE ORAL at 08:08

## 2022-08-01 RX ADMIN — ASPIRIN 81 MG: 81 TABLET, COATED ORAL at 08:08

## 2022-08-01 NOTE — PT/OT/SLP PROGRESS
Physical Therapy Treatment    Patient Name:  Lauren Ring   MRN:  [id number]    Recommendations:     Discharge Recommendations:  home health PT, rehabilitation facility, other (see comments) (pending progress)   Discharge Equipment Recommendations:       Subjective     Patient oriented 3.     Objective:     General Precautions: Standard,     Orthopedic Precautions:RLE non weight bearing   Braces:    Respiratory Status: Room air     Communicated with nurse prior to treatment.     Functional Mobility:    Rolling:Modified Independent  Supine to sit:Modified Independent  Sit to stand transfer: Stand-by Assistance  Bed to chair transfer:Stand-by Assistance  RA sats 94% and with mobility 89% but increases quickly after rest break. Gait to bathroom to have BM and additional 20 ft RW, NWB RLE and min assist.       AM-PAC 6 CLICK MOBILITY        Patient left up in chair with all lines intact..    GOALS:   Multidisciplinary Problems     Physical Therapy Goals        Problem: Physical Therapy    Goal Priority Disciplines Outcome Goal Variances Interventions   Physical Therapy Goal     PT, PT/OT Ongoing, Progressing     Description: Goals to be met by: 22    Patient will increase functional independence with mobility by performin. Sit to stand transfer with Modified [place], NWB R LE  2. Bed to chair transfer with Modified [place] using Rolling Walker, NWB R LE  3. Gait  x 150 feet with Modified [place] using Rolling Walker, NWB R LE                     Assessment:     Lauren Ring is a 62 y.o. female admitted with a medical diagnosis of <principal problem not specified>.     Rehab Prognosis: Good; patient would benefit from acute skilled PT services to address these deficits and reach maximum level of function.    Recent Surgery: Procedure(s) (LRB):  INCISION AND DRAINAGE, LOWER EXTREMITY (Right) 4 Days Post-Op    Plan:     During this hospitalization, patient to be seen daily (QD to BID) to address  the identified rehab impairments via gait training, therapeutic activities, therapeutic exercises and progress toward the following goals:    · Plan of Care Expires:  08/28/22    Billable Minutes     Billable Minutes: Gait Training 12 and Therapeutic Activity 12    Treatment Type: Treatment  PT/PTA: PTA     PTA Visit Number: 3     08/01/2022

## 2022-08-01 NOTE — PLAN OF CARE
Problem: Adult Inpatient Plan of Care  Goal: Patient-Specific Goal (Individualized)  Outcome: Ongoing, Progressing     Problem: Infection  Goal: Absence of Infection Signs and Symptoms  Outcome: Ongoing, Progressing     Problem: Impaired Wound Healing  Goal: Optimal Wound Healing  Outcome: Ongoing, Progressing

## 2022-08-01 NOTE — PROGRESS NOTES
[facility] - Ortho Neuro  Orthopedics  Progress Note    Patient Name: Lauren Ring  MRN: [id number]  Admission Date: 7/28/2022  Hospital Length of Stay: 2 days  Attending Provider: Javan Hassan DO  Primary Care Provider: WINNIE Luu  Follow-up For: Procedure(s) (LRB):  INCISION AND DRAINAGE, LOWER EXTREMITY (Right)    Post-Operative Day: 4 Day Post-Op  Subjective:     Principal Problem:<principal problem not specified>    Principal Orthopedic Problem: 4 Day Post-Op   Hardware removal with I&d to right lower ext    Interval History: Patient resting comfortably this morning. States her pain is well controlled with medications. She is in agreement with short stay in rehab facility during wound healing. Sutures to remain in place approx 3 weeks. We are awaiting skilled nursing placement at this time. Case management is diligently working on this. Patient is eager for discharge home. Medicine team helping with chronic conditions.     Review of patient's allergies indicates:   Allergen Reactions    Codeine     Tetanus toxoid        Current Facility-Administered Medications   Medication    albuterol inhaler 2 puff    albuterol-ipratropium 2.5 mg-0.5 mg/3 mL nebulizer solution 3 mL    aspirin EC tablet 81 mg    bisacodyL suppository 10 mg    cefazolin (ANCEF) 2 gram in dextrose 5% 50 mL IVPB (premix)    diphenhydrAMINE capsule 25 mg    docusate sodium capsule 100 mg    enoxaparin injection 40 mg    famotidine tablet 20 mg    fluticasone furoate-vilanteroL 100-25 mcg/dose diskus inhaler 1 puff    isosorbide mononitrate 24 hr tablet 30 mg    magnesium hydroxide 400 mg/5 ml suspension 2,400 mg    methocarbamoL tablet 750 mg    morphine injection 4 mg    ondansetron disintegrating tablet 4 mg    ondansetron injection 4 mg    ondansetron injection 4 mg    oxyCODONE immediate release tablet 10 mg    oxyCODONE immediate release tablet 5 mg    senna tablet 8.6 mg    sodium chloride  "0.9% flush 10 mL    traZODone tablet 50 mg     Objective:     Vital Signs (Most Recent):  Temp: 98.8 °F (37.1 °C) (08/01/22 1111)  Pulse: 101 (08/01/22 1111)  Resp: 18 (08/01/22 1303)  BP: 119/68 (08/01/22 1111)  SpO2: 95 % (08/01/22 1111) Vital Signs (24h Range):  Temp:  [98.4 °F (36.9 °C)-98.8 °F (37.1 °C)] 98.8 °F (37.1 °C)  Pulse:  [] 101  Resp:  [16-20] 18  SpO2:  [95 %-96 %] 95 %  BP: (119-128)/(68-81) 119/68     Weight: 57.2 kg (126 lb 1.7 oz)  Height: 5' 6" (167.6 cm)  Body mass index is 20.35 kg/m².      Intake/Output Summary (Last 24 hours) at 8/1/2022 1544  Last data filed at 8/1/2022 1400  Gross per 24 hour   Intake 1280 ml   Output 3050 ml   Net -1770 ml       Physical Exam:   Musculoskeletal:       Right lower extremity:Dressing CDI without signs of drainage; compartments are soft and compressible;Tolerates passive ROM of the knee and ankle; appropriate tenderness to palpation; dorsi/plantar flexes the foot; SILT distally; BCR distally; DP pulse palpable        Diagnostic Findings:   Significant Labs:   Recent Lab Results       07/31/22  0354   07/30/22  0432   07/29/22  2117        Albumin/Globulin Ratio 1.0   1.0         Albumin 2.6   2.7         Alkaline Phosphatase 56   57         ALT 11   16         AST 20   48         Baso # 0.08   0.08         Basophil % 0.9   1.0         BILIRUBIN TOTAL 0.3   0.3         BUN 4.6   4.2         Calcium 8.4   8.6         Chloride 108   107         CO2 25   25         Creatinine 0.69   0.70         eGFR if non African American >60   >60         Eos # 0.28   0.19         Eosinophil % 3.0   2.3         Globulin, Total 2.7   2.7         Glucose 107   116         Hematocrit 31.4   34.6         Hemoglobin 9.8   10.6         Immature Grans (Abs) 0.04   0.05         Immature Granulocytes 0.4   0.6         Lymph # 2.08   1.63         LYMPH % 22.6   19.9         MCH 28.9   29.0         MCHC 31.2   30.6         MCV 92.6   94.5         Mono # 1.16   1.00         Mono % " 12.6   12.2         MPV 10.5   10.5         Neut # 5.6   5.3         Neut % 60.5   64.0         nRBC 0.0   0.0         Platelets 186   186         POCT Glucose     112       Potassium 4.2   4.3         PROTEIN TOTAL 5.3   5.4         RBC 3.39   3.66         RDW 14.3   14.5         Sodium 140   141         WBC 9.2   8.2                Significant Imaging: I have reviewed and interpreted all pertinent imaging results/findings.     Assessment/Plan:     There are no hospital problems to display for this patient.  patient s/p hardware removal and I&D of draining sinus tract to the right ankle.   Will need daily wound care in facility until she is able to manage wound care on her own at home  Remain NWB to the RLE for approx 3 weeks until followup. Daily dry dressing changes per wound care.   She is orthopaedically stable for discharge once a SNF  Bed has been obtained. Case management is working diligently ion this  Follow up with Dr. Hassan in 3 weeks for wound check.   Lovenox for DVT ppx during stay. Aspirin upon discharge.        The above findings, diagnostics, and treatment plan were discussed with Dr. Hassan who is in agreement with the plan of care except as stated in additional documentation.      Katie Grider PA-C  [facility]   Orthopedic Trauma

## 2022-08-01 NOTE — PROGRESS NOTES
[facility]  Hospital Medicine Progress Note        Chief Complaint: Inpatient Follow-up for     HPI: 62 y.o. female with PMHx COPD, chronic hepatitis C treated and cured, anxiety, Stage 1B NSCLC - Adenocarcinoma, RUL, s/p Right upper pulmonary lobectomy & mediastinal lymph node dissection on 6/2/17. She was admitted to [facility] on 7/28/2022 for a scheduled elective procedure. Of note, patient initially fell and sustained right tibia/fibula fx on 2/21/21, she underwent ex fix on 3/9/21 followed by ORIF on 3/23/21. Patient reportedly started having some intermittent drainage from her surgical incision over the past few months. She was seen at Urgent Care x1 week ago and given oral Clindamycin. Reported she was unable to ambulate over the past few weeks, and saw Ortho on 7/25/22, was noted to have a blister over the medial wound that has since opened and was draining. She presented to [facility] on 7/28/22 and underwent I&D right ankle with hardware removal by Dr. Hassan. Hospital Medicine team was consulted for medical management    August 1, 2022, patient is doing well, no fever, no shortness for breath, no nausea  Interval Hx:       Objective/physical exam:  General: In no acute distress, afebrile  Chest: Clear to auscultation bilaterally  Heart: RRR, +S1, S2, no appreciable murmur  Abdomen: Soft, nontender, BS +  MSK: Warm, no lower extremity edema, no clubbing or cyanosis  Neurologic: Alert and oriented x4, Cranial nerve II-XII intact, Strength 5/5 in all 4 extremities    VITAL SIGNS: 24 HRS MIN & MAX LAST   Temp  Min: 98.4 °F (36.9 °C)  Max: 98.9 °F (37.2 °C) 98.4 °F (36.9 °C)   BP  Min: 110/66  Max: 128/81 120/75   Pulse  Min: 82  Max: 101  82   Resp  Min: 16  Max: 20 18   SpO2  Min: 95 %  Max: 96 % 96 %       Recent Labs   Lab 07/29/22  0459 07/30/22  0432 07/31/22  0354   WBC 12.6* 8.2 9.2   RBC 3.85* 3.66* 3.39*   HGB 11.0* 10.6* 9.8*   HCT 36.0* 34.6* 31.4*   MCV 93.5 94.5* 92.6   MCH  28.6 29.0 28.9   MCHC 30.6* 30.6* 31.2*   RDW 14.5 14.5 14.3    186 186   MPV 10.8* 10.5* 10.5*       Recent Labs   Lab 07/29/22  0459 07/30/22  0432 07/31/22  0354    141 140   K 4.4 4.3 4.2   CO2 24 25 25   BUN 5.9* 4.2* 4.6*   CREATININE 0.67 0.70 0.69   CALCIUM 8.4 8.6 8.4   ALBUMIN 2.8* 2.7* 2.6*   ALKPHOS 51 57 56   ALT 10 16 11   AST 16 48* 20   BILITOT 0.3 0.3 0.3          Microbiology Results (last 7 days)     ** No results found for the last 168 hours. **           See below for Radiology    Scheduled Med:   aspirin  81 mg Oral Daily    ceFAZolin (ANCEF) IVPB  2 g Intravenous Q8H    docusate sodium  100 mg Oral Daily    enoxaparin  40 mg Subcutaneous Daily    fluticasone furoate-vilanteroL  1 puff Inhalation Daily    isosorbide mononitrate  30 mg Oral Daily    methocarbamoL  750 mg Oral TID    traZODone  50 mg Oral Nightly        Continuous Infusions:       PRN Meds:  albuterol, albuterol-ipratropium, bisacodyL, diphenhydrAMINE, famotidine, magnesium hydroxide 400 mg/5 ml, morphine, ondansetron, ondansetron, ondansetron, oxyCODONE, oxyCODONE, senna, sodium chloride 0.9%       Assessment/Plan:  Chronic R Ankle Wound s/p I&D and removal of hardware on 7/28/22   Hx of Closed, displaced R tib/fib fx s/p ORIF on 3/23/21   Leukocytosis resolved   Metabolic Acidosis resolved   Anxiety   Hx of COPD, chronic hepatitis C treated and cured, Stage 1B NSCLC - Adenocarcinoma, RUL, s/p Right upper pulmonary lobectomy & mediastinal lymph node dissection on 6/2/17 in remission      continue IV cefazolin  Continue Lovenox 40 mg subQ q.d.  Consult orthopedic surgeon  Patient remains nonweightbearing to the right leg  Consult  for skilled nursing facility    VTE prophylaxis:     Patient condition:  Stable/Fair/Guarded/ Serious/ Critical    Anticipated discharge and Disposition:         All diagnosis and differential diagnosis have been reviewed; assessment and plan has been documented; I have  personally reviewed the labs and test results that are presently available; I have reviewed the patients medication list; I have reviewed the consulting providers response and recommendations. I have reviewed or attempted to review medical records based upon their availability    All of the patient's questions have been  addressed and answered. Patient's is agreeable to the above stated plan. I will continue to monitor closely and make adjustments to medical management as needed.  _____________________________________________________________________    Nutrition Status:    Radiology:  SURG FL Surgery Fluoro Usage  See OP Notes for results.     IMPRESSION: See OP Notes for results.     This procedure was auto-finalized by: Virtual Radiologist  X-Ray Chest PA And Lateral  Narrative: EXAMINATION:  XR CHEST PA AND LATERAL    CLINICAL HISTORY:  pre op;    TECHNIQUE:  Two views    COMPARISON:  June 9, 2007.    FINDINGS:  Cardiopericardial silhouette is within normal limits.  Lungs emphysematous changes without dense focal or segmental consolidation, congestion, pleural effusion or pneumothorax.  Impression: No acute cardiopulmonary process identified.    Electronically signed by: Quirino Dimas  Date:    07/28/2022  Time:    07:21      Isa Willett MD   08/01/2022

## 2022-08-02 VITALS
DIASTOLIC BLOOD PRESSURE: 83 MMHG | HEART RATE: 110 BPM | TEMPERATURE: 100 F | BODY MASS INDEX: 20.27 KG/M2 | WEIGHT: 126.13 LBS | HEIGHT: 66 IN | OXYGEN SATURATION: 94 % | SYSTOLIC BLOOD PRESSURE: 146 MMHG | RESPIRATION RATE: 18 BRPM

## 2022-08-02 PROBLEM — S82.876D: Status: ACTIVE | Noted: 2022-08-02

## 2022-08-02 PROBLEM — S82.876D: Status: RESOLVED | Noted: 2022-08-02 | Resolved: 2022-08-02

## 2022-08-02 PROBLEM — L98.8 DRAINING CUTANEOUS SINUS TRACT: Status: ACTIVE | Noted: 2022-08-02

## 2022-08-02 PROCEDURE — G0378 HOSPITAL OBSERVATION PER HR: HCPCS

## 2022-08-02 PROCEDURE — 27000221 HC OXYGEN, UP TO 24 HOURS

## 2022-08-02 PROCEDURE — 97530 THERAPEUTIC ACTIVITIES: CPT | Mod: CQ

## 2022-08-02 PROCEDURE — 96366 THER/PROPH/DIAG IV INF ADDON: CPT

## 2022-08-02 PROCEDURE — 63600175 PHARM REV CODE 636 W HCPCS: Performed by: PHYSICIAN ASSISTANT

## 2022-08-02 PROCEDURE — 25000003 PHARM REV CODE 250: Performed by: PHYSICIAN ASSISTANT

## 2022-08-02 RX ORDER — HYDROCODONE BITARTRATE AND ACETAMINOPHEN 10; 325 MG/1; MG/1
1 TABLET ORAL EVERY 4 HOURS PRN
Qty: 42 TABLET | Refills: 0 | Status: SHIPPED | OUTPATIENT
Start: 2022-08-02 | End: 2022-08-09

## 2022-08-02 RX ORDER — METHOCARBAMOL 750 MG/1
750 TABLET, FILM COATED ORAL 3 TIMES DAILY
Qty: 30 TABLET | Refills: 0 | Status: SHIPPED | OUTPATIENT
Start: 2022-08-02 | End: 2022-08-12

## 2022-08-02 RX ADMIN — OXYCODONE 10 MG: 5 TABLET ORAL at 05:08

## 2022-08-02 RX ADMIN — METHOCARBAMOL 750 MG: 750 TABLET ORAL at 05:08

## 2022-08-02 RX ADMIN — OXYCODONE 10 MG: 5 TABLET ORAL at 12:08

## 2022-08-02 RX ADMIN — FLUTICASONE FUROATE AND VILANTEROL TRIFENATATE 1 PUFF: 100; 25 POWDER RESPIRATORY (INHALATION) at 08:08

## 2022-08-02 RX ADMIN — METHOCARBAMOL 750 MG: 750 TABLET ORAL at 02:08

## 2022-08-02 RX ADMIN — CEFAZOLIN SODIUM 2 G: 2 SOLUTION INTRAVENOUS at 08:08

## 2022-08-02 RX ADMIN — DOCUSATE SODIUM 100 MG: 50 CAPSULE, LIQUID FILLED ORAL at 08:08

## 2022-08-02 RX ADMIN — OXYCODONE 10 MG: 5 TABLET ORAL at 08:08

## 2022-08-02 RX ADMIN — ASPIRIN 81 MG: 81 TABLET, COATED ORAL at 08:08

## 2022-08-02 NOTE — PT/OT/SLP PROGRESS
Physical Therapy Treatment    Patient Name:  Lauren Ring   MRN:  [id number]    Recommendations:     Discharge Recommendations:  outpatient PT, home with home health, home health PT   Discharge Equipment Recommendations:     Barriers to discharge: None    Assessment:     Lauren Ring is a 62 y.o. female admitted with a medical diagnosis of Draining cutaneous sinus tract.  She presents with the following impairments/functional limitations:  impaired endurance, impaired functional mobility, impaired balance, pain, decreased lower extremity function.    Rehab Prognosis: Good; patient would benefit from acute skilled PT services to address these deficits and reach maximum level of function.    Recent Surgery: Procedure(s) (LRB):  INCISION AND DRAINAGE, LOWER EXTREMITY (Right) 5 Days Post-Op    Plan:     During this hospitalization, patient to be seen daily (QD to BID) to address the identified rehab impairments via gait training, therapeutic activities, therapeutic exercises and progress toward the following goals:    · Plan of Care Expires:  08/28/22    Subjective     Chief Complaint: No c/o at this time.  Pt reports that she is DCing later this PM to home.  Pain/Comfort:  · Pain Rating 1: 3/10  · Location - Side 1: Right  · Location 1: ankle  · Pain Addressed 1: Pre-medicate for activity  · Pain Rating Post-Intervention 1: 2/10      Objective:     Communicated with NSG prior to session.  Patient found HOB elevated with peripheral IV upon PT entry to room.     General Precautions: Standard,     Orthopedic Precautions:RLE non weight bearing   Braces:    Respiratory Status: Room air 2L NC on standby in case of SOB.   SPO2: at rest: 95% 100HR   SPO2: with activity: 90% 133HR  Pt returns back to WNL <30secs.     Functional Mobility:  · Bed Mobility:     · Supine to Sit: independence  · Sit to Supine: independence  · Transfers:     · Sit to Stand:  modified independence with rolling walker  · Toilet Transfer: contact guard  assistance with  rolling walker  using  Step Transfer  · Gait: 10ft hopping to/from restroom d/t restroom needs.  (+) BM.  Pt performed self perineal care.    Therapeutic Activities and Exercises:   Supine B LE TE: SAQs  , hip ABD/ADD   and SLR 1 x 15 reps each    Patient left HOB elevated with all lines intact and call button in reach..    GOALS:   Multidisciplinary Problems     Physical Therapy Goals        Problem: Physical Therapy    Goal Priority Disciplines Outcome Goal Variances Interventions   Physical Therapy Goal     PT, PT/OT Ongoing, Progressing     Description: Goals to be met by: 22    Patient will increase functional independence with mobility by performin. Sit to stand transfer with Modified [place], NWB R LE  2. Bed to chair transfer with Modified [place] using Rolling Walker, NWB R LE  3. Gait  x 150 feet with Modified [place] using Rolling Walker, NWB R LE                     Time Tracking:     PT Received On: 22  PT Start Time: 951     PT Stop Time: 1011  PT Total Time (min): 20 min     Billable Minutes: Therapeutic Activity 20    Treatment Type: Treatment  PT/PTA: PTA     PTA Visit Number: 4     2022

## 2022-08-02 NOTE — PLAN OF CARE
Tiburcio notified that pt has talked with  who agrees for home with HH. [facility] referral. Raquel notified. Discharge today.  participated with wound care this am and can continue. Nursing will send home some supplies   Pt has needed equipment and will arrange for  or caregiver to transport.   No further assist needed

## 2022-08-02 NOTE — PROGRESS NOTES
[facility]  Hospital Medicine Progress Note        Chief Complaint: Inpatient Follow-up for     HPI: 62 y.o. female with PMHx COPD, chronic hepatitis C treated and cured, anxiety, Stage 1B NSCLC - Adenocarcinoma, RUL, s/p Right upper pulmonary lobectomy & mediastinal lymph node dissection on 6/2/17. She was admitted to [facility] on 7/28/2022 for a scheduled elective procedure. Of note, patient initially fell and sustained right tibia/fibula fx on 2/21/21, she underwent ex fix on 3/9/21 followed by ORIF on 3/23/21. Patient reportedly started having some intermittent drainage from her surgical incision over the past few months. She was seen at Urgent Care x1 week ago and given oral Clindamycin. Reported she was unable to ambulate over the past few weeks, and saw Ortho on 7/25/22, was noted to have a blister over the medial wound that has since opened and was draining. She presented to [facility] on 7/28/22 and underwent I&D right ankle with hardware removal by Dr. Hassan. Hospital Medicine team was consulted for medical management   August 1, 2022, patient is doing well, no fever, no shortness for breath, no nausea   August 2, 2022, no complaints, patient is afebrile, waiting for skilled nursing facility      Interval Hx:       Objective/physical exam:  General: In no acute distress, afebrile  Chest: Clear to auscultation bilaterally  Heart: RRR, +S1, S2, no appreciable murmur  Abdomen: Soft, nontender, BS +  MSK: Warm, no lower extremity edema, no clubbing or cyanosis, right ankle covered with dressing  Neurologic: Alert and oriented x4, Cranial nerve II-XII intact, Strength 5/5 in all 4 extremities    VITAL SIGNS: 24 HRS MIN & MAX LAST   Temp  Min: 98.2 °F (36.8 °C)  Max: 99.8 °F (37.7 °C) 98.2 °F (36.8 °C)   BP  Min: 119/68  Max: 126/71 123/81   Pulse  Min: 89  Max: 102  89   Resp  Min: 17  Max: 18 18   SpO2  Min: 95 %  Max: 97 % 97 %       Recent Labs   Lab 07/29/22  0459 07/30/22  4667  07/31/22  0354   WBC 12.6* 8.2 9.2   RBC 3.85* 3.66* 3.39*   HGB 11.0* 10.6* 9.8*   HCT 36.0* 34.6* 31.4*   MCV 93.5 94.5* 92.6   MCH 28.6 29.0 28.9   MCHC 30.6* 30.6* 31.2*   RDW 14.5 14.5 14.3    186 186   MPV 10.8* 10.5* 10.5*       Recent Labs   Lab 07/29/22  0459 07/30/22  0432 07/31/22  0354    141 140   K 4.4 4.3 4.2   CO2 24 25 25   BUN 5.9* 4.2* 4.6*   CREATININE 0.67 0.70 0.69   CALCIUM 8.4 8.6 8.4   ALBUMIN 2.8* 2.7* 2.6*   ALKPHOS 51 57 56   ALT 10 16 11   AST 16 48* 20   BILITOT 0.3 0.3 0.3          Microbiology Results (last 7 days)     ** No results found for the last 168 hours. **           See below for Radiology    Scheduled Med:   aspirin  81 mg Oral Daily    ceFAZolin (ANCEF) IVPB  2 g Intravenous Q8H    docusate sodium  100 mg Oral Daily    enoxaparin  40 mg Subcutaneous Daily    fluticasone furoate-vilanteroL  1 puff Inhalation Daily    isosorbide mononitrate  30 mg Oral Daily    methocarbamoL  750 mg Oral TID    traZODone  50 mg Oral Nightly        Continuous Infusions:       PRN Meds:  albuterol, albuterol-ipratropium, bisacodyL, diphenhydrAMINE, famotidine, magnesium hydroxide 400 mg/5 ml, morphine, ondansetron, ondansetron, ondansetron, oxyCODONE, oxyCODONE, senna, sodium chloride 0.9%       Assessment/Plan:  Chronic R Ankle Wound s/p I&D and removal of hardware on 7/28/22   Hx of Closed, displaced R tib/fib fx s/p ORIF on 3/23/21   Leukocytosis resolved   Metabolic Acidosis resolved   Anxiety   Hx of COPD, chronic hepatitis C treated and cured, Stage 1B NSCLC - Adenocarcinoma, RUL, s/p Right upper pulmonary lobectomy & mediastinal lymph node dissection on 6/2/17 in remission       continue IV cefazolin   Continue Lovenox 40 mg subQ q.d.   Consult orthopedic surgeon   Patient remains nonweightbearing to the right leg   Consult  for skilled nursing facility      VTE prophylaxis:     Patient condition:  Stable/Fair/Guarded/ Serious/ Critical    Anticipated  discharge and Disposition:         All diagnosis and differential diagnosis have been reviewed; assessment and plan has been documented; I have personally reviewed the labs and test results that are presently available; I have reviewed the patients medication list; I have reviewed the consulting providers response and recommendations. I have reviewed or attempted to review medical records based upon their availability    All of the patient's questions have been  addressed and answered. Patient's is agreeable to the above stated plan. I will continue to monitor closely and make adjustments to medical management as needed.  _____________________________________________________________________    Nutrition Status:    Radiology:  SURG FL Surgery Fluoro Usage  See OP Notes for results.     IMPRESSION: See OP Notes for results.     This procedure was auto-finalized by: Virtual Radiologist  X-Ray Chest PA And Lateral  Narrative: EXAMINATION:  XR CHEST PA AND LATERAL    CLINICAL HISTORY:  pre op;    TECHNIQUE:  Two views    COMPARISON:  June 9, 2007.    FINDINGS:  Cardiopericardial silhouette is within normal limits.  Lungs emphysematous changes without dense focal or segmental consolidation, congestion, pleural effusion or pneumothorax.  Impression: No acute cardiopulmonary process identified.    Electronically signed by: Quirino Dimas  Date:    07/28/2022  Time:    07:21      Isa Willett MD   08/02/2022

## 2022-08-02 NOTE — DISCHARGE INSTRUCTIONS
Change dressing daily; fill open area on right ankle with saline moist gauze, cover suture area with xeroform and dry gauze, secure with rolleed gauze, cast padding, and ace with no compression.

## 2022-08-08 NOTE — DISCHARGE SUMMARY
Discharge Summary    Admit Date: 07/28/2022    Discharge Date: 08/02/2022    Operative Procedure:  Removal Right ankle deep implant under anesthesia - 20680, excisional debridement sinus tract Right medial tibia [id number]    History of Present Illness/Hospital Course: Lauren Ring is a 62 y.o. female presenting with the aforementioned injuries/findings.  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  She has chronic draining wounds from her ankle appear to be small serous sanguinous drainage she has a history of severe COPD and does not wear her oxygen as expected.  This time she now has pain and a blister over the medial wound which has popped and let the full-thickness loss.  No sign of purulence erythema.  The procedure is indicated to best alleviate symptoms of the implant remaining in place.  The patient is awake and alert. After thorough discussion of the risks, benefits, expected outcomes, and alternatives to surgical intervention, the patient agreed to proceed with surgical treatment.  Specific risks discussed included, but were not limited to: superficial or deep infection, wound healing complications, DVT/PE, significant bleeding requiring transfusion, damage to named anatomic structures in the immediate area including named neurovascular structures, failure to alleviate symptoms, refracture, and general risks of anesthesia.  The patient voiced understanding and written as well as verbal consent was obtained by myself prior to the procedure. She tolerated procedure well with adequate pain control.     Discharge Disposition: Home with home health    Activity: as tolerated. NWB to RLE until follow up    Diet: resume previous home diet    Medications: Norco 10/325 mg PO q4h PRN pain, robaxin 750 mg PO TID PRN spasms, Continue home medications as prescribed    Discharge Instructions: Daily dry dressing changes with wound care as during stay to the right ankle    Follow Up: 2  weeks with Dr. Hassan for wound check and progression of weight bearing.     Katie Grider PA-C  [facility]   Orthopedic Trauma

## 2022-08-10 DIAGNOSIS — S82.874D CLOSED NONDISPLACED PILON FRACTURE OF RIGHT TIBIA WITH ROUTINE HEALING, SUBSEQUENT ENCOUNTER: Primary | ICD-10-CM

## 2022-08-12 DIAGNOSIS — S82.874D CLOSED NONDISPLACED PILON FRACTURE OF RIGHT TIBIA WITH ROUTINE HEALING, SUBSEQUENT ENCOUNTER: Primary | ICD-10-CM

## 2022-08-12 DIAGNOSIS — L98.8 DRAINING CUTANEOUS SINUS TRACT: ICD-10-CM

## 2022-08-12 RX ORDER — OXYCODONE AND ACETAMINOPHEN 5; 325 MG/1; MG/1
1 TABLET ORAL EVERY 8 HOURS PRN
Qty: 21 TABLET | Refills: 0 | Status: SHIPPED | OUTPATIENT
Start: 2022-08-12 | End: 2022-08-19

## 2022-08-22 ENCOUNTER — HOSPITAL ENCOUNTER (OUTPATIENT)
Dept: RADIOLOGY | Facility: CLINIC | Age: 62
Discharge: HOME | End: 2022-08-22
Attending: ORTHOPAEDIC SURGERY
Payer: MEDICARE

## 2022-08-22 ENCOUNTER — OFFICE VISIT (OUTPATIENT)
Dept: ORTHOPEDICS | Facility: CLINIC | Age: 62
End: 2022-08-22
Payer: MEDICARE

## 2022-08-22 VITALS
WEIGHT: 126.13 LBS | SYSTOLIC BLOOD PRESSURE: 146 MMHG | BODY MASS INDEX: 20.27 KG/M2 | HEIGHT: 66 IN | HEART RATE: 100 BPM | DIASTOLIC BLOOD PRESSURE: 83 MMHG | RESPIRATION RATE: 18 BRPM

## 2022-08-22 DIAGNOSIS — S82.874D CLOSED NONDISPLACED PILON FRACTURE OF RIGHT TIBIA WITH ROUTINE HEALING, SUBSEQUENT ENCOUNTER: Primary | ICD-10-CM

## 2022-08-22 DIAGNOSIS — L98.8 DRAINING CUTANEOUS SINUS TRACT: ICD-10-CM

## 2022-08-22 DIAGNOSIS — S82.874D CLOSED NONDISPLACED PILON FRACTURE OF RIGHT TIBIA WITH ROUTINE HEALING, SUBSEQUENT ENCOUNTER: ICD-10-CM

## 2022-08-22 PROCEDURE — 99024 POSTOP FOLLOW-UP VISIT: CPT | Mod: ,,, | Performed by: PHYSICIAN ASSISTANT

## 2022-08-22 PROCEDURE — 3077F PR MOST RECENT SYSTOLIC BLOOD PRESSURE >= 140 MM HG: ICD-10-PCS | Mod: CPTII,,, | Performed by: PHYSICIAN ASSISTANT

## 2022-08-22 PROCEDURE — 3079F PR MOST RECENT DIASTOLIC BLOOD PRESSURE 80-89 MM HG: ICD-10-PCS | Mod: CPTII,,, | Performed by: PHYSICIAN ASSISTANT

## 2022-08-22 PROCEDURE — 1159F MED LIST DOCD IN RCRD: CPT | Mod: CPTII,,, | Performed by: PHYSICIAN ASSISTANT

## 2022-08-22 PROCEDURE — 1159F PR MEDICATION LIST DOCUMENTED IN MEDICAL RECORD: ICD-10-PCS | Mod: CPTII,,, | Performed by: PHYSICIAN ASSISTANT

## 2022-08-22 PROCEDURE — 3008F PR BODY MASS INDEX (BMI) DOCUMENTED: ICD-10-PCS | Mod: CPTII,,, | Performed by: PHYSICIAN ASSISTANT

## 2022-08-22 PROCEDURE — 99024 PR POST-OP FOLLOW-UP VISIT: ICD-10-PCS | Mod: ,,, | Performed by: PHYSICIAN ASSISTANT

## 2022-08-22 PROCEDURE — 73610 XR ANKLE COMPLETE 3 VIEW RIGHT: ICD-10-PCS | Mod: RT,,, | Performed by: ORTHOPAEDIC SURGERY

## 2022-08-22 PROCEDURE — 73610 X-RAY EXAM OF ANKLE: CPT | Mod: RT,,, | Performed by: ORTHOPAEDIC SURGERY

## 2022-08-22 PROCEDURE — 3008F BODY MASS INDEX DOCD: CPT | Mod: CPTII,,, | Performed by: PHYSICIAN ASSISTANT

## 2022-08-22 PROCEDURE — 3079F DIAST BP 80-89 MM HG: CPT | Mod: CPTII,,, | Performed by: PHYSICIAN ASSISTANT

## 2022-08-22 PROCEDURE — 3077F SYST BP >= 140 MM HG: CPT | Mod: CPTII,,, | Performed by: PHYSICIAN ASSISTANT

## 2022-08-22 RX ORDER — METHOCARBAMOL 500 MG/1
500 TABLET, FILM COATED ORAL 3 TIMES DAILY
Qty: 30 TABLET | Refills: 0 | Status: SHIPPED | OUTPATIENT
Start: 2022-08-22 | End: 2022-09-01

## 2022-08-22 NOTE — PROGRESS NOTES
"Subjective:       Patient ID: Lauren Ring is a 62 y.o. female.  Chief Complaint   Patient presents with    Right Ankle - Post-op Evaluation     4 WEEK F/U RIGHT TIBIA HW REMOVAL, WOUND CHECK.          HPI       Patient presents for proximally 4 week follow-up of removal of hardware from her right  Ankle. She has had assistance with home health for wound care to her medial ankle. Remainder of incisions are healing well. She has been doing dressing changes at home daily. She has been compliant with non weight bearing status. She denies fevers or chills. Endorses serous drainage from incision site.     ROS:  Constitutional: Denies fever chills  Eyes: No change in vision  ENT: No ringing or current infections  CV: No chest pain  Resp: No labored breathing  MSK: Pain evident at site of injury located in HPI,   Integ: No signs of abrasions or lacerations  Neuro: No numbness or tingling  Lymphatic: No swelling outside the area of injury     Current Outpatient Medications on File Prior to Visit   Medication Sig Dispense Refill    albuterol-ipratropium (DUO-NEB) 2.5 mg-0.5 mg/3 mL nebulizer solution Take 3 mLs by nebulization 3 (three) times daily as needed.      ALPRAZolam (XANAX) 0.25 MG tablet Take 0.25 mg by mouth 2 (two) times daily as needed.      aspirin (ECOTRIN) 81 MG EC tablet Take 81 mg by mouth once daily.      PROAIR HFA 90 mcg/actuation inhaler Inhale 2 puffs into the lungs every 6 (six) hours as needed.      SYMBICORT 160-4.5 mcg/actuation HFAA Inhale 1 puff into the lungs Daily.      traZODone (DESYREL) 50 MG tablet Take 1 tablet by mouth nightly.       No current facility-administered medications on file prior to visit.          Objective:      BP (!) 146/83   Pulse 100   Resp 18   Ht 5' 6" (1.676 m)   Wt 57.2 kg (126 lb 1.7 oz)   LMP  (LMP Unknown)   BMI 20.35 kg/m²   Physical Exam  General the patient is alert and oriented x3 no acute distress nontoxic-appearing appropriate " affect.    Constitutional: Vital signs are examined and stable.  Resp: No signs of labored breathing    Musculoskeletal:     Right lower extremity: incisions healing well, distal incision medially with small area of serous drainage;  Sutures left in place distally all other sutures removed. compartments are soft and compressible; No pain with ROM at the hip, knee, or ankle; no tenderness to palpation; dorsi/plantar flexes the foot; SILT distally; BCR distally; DP pulse palpable        Body mass index is 20.35 kg/m².  Ideal body weight: 59.3 kg (130 lb 11.7 oz)  No results found for: HGBA1C  Hgb   Date Value Ref Range Status   07/31/2022 9.8 (L) 12.0 - 16.0 gm/dL Final   07/30/2022 10.6 (L) 12.0 - 16.0 gm/dL Final     Hct   Date Value Ref Range Status   07/31/2022 31.4 (L) 37.0 - 47.0 % Final   07/30/2022 34.6 (L) 37.0 - 47.0 % Final     Iron Level   Date Value Ref Range Status   04/10/2019 246 (H) 50 - 170 mcg/dL Final     No components found for: FROLATE  Vit D 25 OH   Date Value Ref Range Status   03/24/2021 41.5 30.0 - 80.0 ng/mL Final   06/27/2019 43.25 30.00 - 80.00 ng/mL Final     WBC   Date Value Ref Range Status   07/31/2022 9.2 4.5 - 11.5 x10(3)/mcL Final   07/30/2022 8.2 4.5 - 11.5 x10(3)/mcL Final       Radiology: Three view x-rays of the right ankle hardware intact  without signs of loosening or failure.   Bony consolidation consistent with routine healing as compared to images taken prior to hardware removal.        Assessment:         1. Closed nondisplaced pilon fracture of right tibia with routine healing, subsequent encounter  X-Ray Ankle Complete Right    methocarbamoL (ROBAXIN) 500 MG Tab   2. Draining cutaneous sinus tract  X-Ray Ankle Complete Right           Plan:         Follow up in about 1 week (around 8/29/2022), or if symptoms worsen or fail to improve.    Lauren was seen today for post-op evaluation.    Diagnoses and all orders for this visit:    Closed nondisplaced pilon fracture of right  tibia with routine healing, subsequent encounter  -     X-Ray Ankle Complete Right; Future  -     methocarbamoL (ROBAXIN) 500 MG Tab; Take 1 tablet (500 mg total) by mouth 3 (three) times daily. for 10 days    Draining cutaneous sinus tract  -     X-Ray Ankle Complete Right; Future        - Begin weight-bearing as tolerated to the right lower extremity.   Range of motion as tolerated  - she would like to begin weight-bearing on her own and will call as needed for physical therapy order  - she states that her Robaxin has been helping with pain control and she is in need of refill at this time;  I have sent this to her pharmacy.  -   Sutures left in place to the distal medial ankle due to area slow to heal with some drainage.  Follow-up in approximately 1 week for repeat wound check and possible suture removal  -ED precautions given    The above findings, diagnostics, and treatment plan were discussed with Dr. Hassan who is in agreement with the plan of care except as stated in additional documentation.     Alexandra Blair Lemaire, PA-C [facility]   Orthopedic Trauma              Future Appointments   Date Time Provider Department Center   8/22/2022  1:00 PM DO [facility]   8/30/2022 10:30 AM DO [facility] [place]   10/4/2022 10:30 AM Jose Juan Dunn MD [facility]

## 2022-08-23 ENCOUNTER — EXTERNAL HOME HEALTH (OUTPATIENT)
Dept: HOME HEALTH SERVICES | Facility: HOSPITAL | Age: 62
End: 2022-08-23
Payer: MEDICARE

## 2022-08-30 ENCOUNTER — OFFICE VISIT (OUTPATIENT)
Dept: ORTHOPEDICS | Facility: CLINIC | Age: 62
End: 2022-08-30
Payer: MEDICARE

## 2022-08-30 VITALS — HEIGHT: 66 IN | WEIGHT: 126 LBS | BODY MASS INDEX: 20.25 KG/M2

## 2022-08-30 DIAGNOSIS — S82.874D CLOSED NONDISPLACED PILON FRACTURE OF RIGHT TIBIA WITH ROUTINE HEALING, SUBSEQUENT ENCOUNTER: Primary | ICD-10-CM

## 2022-08-30 PROCEDURE — 1159F MED LIST DOCD IN RCRD: CPT | Mod: CPTII,,, | Performed by: PHYSICIAN ASSISTANT

## 2022-08-30 PROCEDURE — 3008F PR BODY MASS INDEX (BMI) DOCUMENTED: ICD-10-PCS | Mod: CPTII,,, | Performed by: PHYSICIAN ASSISTANT

## 2022-08-30 PROCEDURE — 99024 PR POST-OP FOLLOW-UP VISIT: ICD-10-PCS | Mod: ,,, | Performed by: PHYSICIAN ASSISTANT

## 2022-08-30 PROCEDURE — 99024 POSTOP FOLLOW-UP VISIT: CPT | Mod: ,,, | Performed by: PHYSICIAN ASSISTANT

## 2022-08-30 PROCEDURE — 1159F PR MEDICATION LIST DOCUMENTED IN MEDICAL RECORD: ICD-10-PCS | Mod: CPTII,,, | Performed by: PHYSICIAN ASSISTANT

## 2022-08-30 PROCEDURE — 3008F BODY MASS INDEX DOCD: CPT | Mod: CPTII,,, | Performed by: PHYSICIAN ASSISTANT

## 2022-08-30 NOTE — PROGRESS NOTES
Subjective:       Patient ID: Lauren Ring is a 62 y.o. female.  Chief Complaint   Patient presents with    Follow-up     POST OP I&d R TIBIA 7/28/22-10/26/2 pt has some pain right foot/ankle, pain is a 4 at the moment, pt took muscle relaxer before appt, no other issues to complain about, pt would like to know how much weight can be put on ankle for therapy        HPI   Patient presents for wound check approximately 4 weeks out from I&D of the right tibia following hardware removal.  Last week she presented with an area of drainage to the medial mal and surgx cream with a dressing was placed appropriately.  Home health has been changing her dressings daily.  She states that over the past 5 days or so her wounds have been cleaned and dry with no further drainage. she denies fevers or chills states that she is putting some weight on the ankle despite our recommendation to remain nonweightbearing.  Therapy has been working on her range of motion to the right ankle she is eager to begin weight-bearing like to further discuss this today.    ROS:  Constitutional: Denies fever chills  Eyes: No change in vision  ENT: No ringing or current infections  CV: No chest pain  Resp: No labored breathing  MSK: Pain evident at site of injury located in HPI,   Integ: No signs of abrasions or lacerations  Neuro: No numbness or tingling  Lymphatic: No swelling outside the area of injury     Current Outpatient Medications on File Prior to Visit   Medication Sig Dispense Refill    albuterol-ipratropium (DUO-NEB) 2.5 mg-0.5 mg/3 mL nebulizer solution Take 3 mLs by nebulization 3 (three) times daily as needed.      ALPRAZolam (XANAX) 0.25 MG tablet Take 0.25 mg by mouth 2 (two) times daily as needed.      aspirin (ECOTRIN) 81 MG EC tablet Take 81 mg by mouth once daily.      methocarbamoL (ROBAXIN) 500 MG Tab Take 1 tablet (500 mg total) by mouth 3 (three) times daily. for 10 days 30 tablet 0    PROAIR HFA 90 mcg/actuation inhaler Inhale  "2 puffs into the lungs every 6 (six) hours as needed.      SYMBICORT 160-4.5 mcg/actuation HFAA Inhale 1 puff into the lungs Daily.      traZODone (DESYREL) 50 MG tablet Take 1 tablet by mouth nightly.       No current facility-administered medications on file prior to visit.          Objective:      Ht 5' 6" (1.676 m)   Wt 57.2 kg (126 lb)   LMP  (LMP Unknown)   BMI 20.34 kg/m²   Physical Exam  General the patient is alert and oriented x3 no acute distress nontoxic-appearing appropriate affect.    Constitutional: Vital signs are examined and stable.  Resp: No signs of labored breathing    Musculoskeletal:     Right lower extremity:  Medially area of previous drainage a shin dry with small scab formation previous area of scab to the proximal incision of the medial ankle remains in place; laterally scab in place as prior with no changes compared to previous examination remained stable; compartments are soft and compressible; No pain with ROM at the hip, knee, or ankle; no tenderness to palpation; dorsi/plantar flexes the foot; SILT distally; BCR distally; DP pulse palpable; sutures removed today without complication        Body mass index is 20.34 kg/m².  Ideal body weight: 59.3 kg (130 lb 11.7 oz)  No results found for: HGBA1C  Hgb   Date Value Ref Range Status   07/31/2022 9.8 (L) 12.0 - 16.0 gm/dL Final   07/30/2022 10.6 (L) 12.0 - 16.0 gm/dL Final     Hct   Date Value Ref Range Status   07/31/2022 31.4 (L) 37.0 - 47.0 % Final   07/30/2022 34.6 (L) 37.0 - 47.0 % Final     Iron Level   Date Value Ref Range Status   04/10/2019 246 (H) 50 - 170 mcg/dL Final     No components found for: FROLATE  Vit D 25 OH   Date Value Ref Range Status   03/24/2021 41.5 30.0 - 80.0 ng/mL Final   06/27/2019 43.25 30.00 - 80.00 ng/mL Final     WBC   Date Value Ref Range Status   07/31/2022 9.2 4.5 - 11.5 x10(3)/mcL Final   07/30/2022 8.2 4.5 - 11.5 x10(3)/mcL Final       Radiology:  No x-rays taken today        Assessment:     "     1. Closed nondisplaced pilon fracture of right tibia with routine healing, subsequent encounter  Ambulatory referral/consult to Physical/Occupational Therapy              Plan:         No follow-ups on file.    Lauren was seen today for follow-up.    Diagnoses and all orders for this visit:    Closed nondisplaced pilon fracture of right tibia with routine healing, subsequent encounter  -     Ambulatory referral/consult to Physical/Occupational Therapy; Future      -Patient doing very well her incisions appear to be dry and continue to improve following surgery and hardware removal.    -she has approximately 4 weeks out.  We will allow her to begin progressive weight-bearing beginning today; over 4 weeks. Continue range of motion as tolerated.    -continue pain control as prior.  She states she is not using much pain medication at this time.    -will follow up with her in 6 weeks for repeat x-rays and evaluation.  -ED precautions given    The above findings, diagnostics, and treatment plan were discussed with Dr. Hassan who is in agreement with the plan of care except as stated in additional documentation.       Katie Grider PA-C          Future Appointments   Date Time Provider Department [place]   10/4/2022 10:30 AM Jose Juan Dunn MD [facility]   10/11/2022  9:30 AM Javan Hassan DO [facility]

## 2022-09-04 ENCOUNTER — DOCUMENT SCAN (OUTPATIENT)
Dept: HOME HEALTH SERVICES | Facility: HOSPITAL | Age: 62
End: 2022-09-04
Payer: MEDICARE

## 2022-09-13 ENCOUNTER — DOCUMENT SCAN (OUTPATIENT)
Dept: HOME HEALTH SERVICES | Facility: HOSPITAL | Age: 62
End: 2022-09-13
Payer: MEDICARE

## 2022-09-16 ENCOUNTER — DOCUMENT SCAN (OUTPATIENT)
Dept: HOME HEALTH SERVICES | Facility: HOSPITAL | Age: 62
End: 2022-09-16
Payer: MEDICARE

## 2022-09-19 ENCOUNTER — HISTORICAL (OUTPATIENT)
Dept: ADMINISTRATIVE | Facility: HOSPITAL | Age: 62
End: 2022-09-19
Payer: MEDICARE

## 2022-09-19 ENCOUNTER — DOCUMENT SCAN (OUTPATIENT)
Dept: HOME HEALTH SERVICES | Facility: HOSPITAL | Age: 62
End: 2022-09-19
Payer: MEDICARE

## 2022-10-03 PROCEDURE — G0179 PR HOME HEALTH MD RECERTIFICATION: ICD-10-PCS | Mod: ,,, | Performed by: GENERAL PRACTICE

## 2022-10-03 PROCEDURE — G0179 MD RECERTIFICATION HHA PT: HCPCS | Mod: ,,, | Performed by: GENERAL PRACTICE

## 2022-10-03 NOTE — PROGRESS NOTES
"Subjective:       Patient ID: Lauren Ring is a 62 y.o. female.    Chief Complaint: Establish Care    Patient presents to the clinic to establish primary care.  Past medical, family, and social history is reviewed, as well as all chronic conditions, and medications prescribed to treat those conditions.  Notably, she does have a very strong history of anxiety, with some depressive features, but mostly anxiety.  Has tried multiple medications, SSRIs, SNRIs, Seroquel, trazodone, none of is been very effective, and most of these medications have caused significant side effects.  She does take alprazolam, previously controlled, she did take some pain medication for short amount of time, therefore her alprazolam dose was decreased by half.  This medication is effective, and does not cause any significant problems or side effects.  She also has COPD with emphysema, sees a specialist, is controlled for the most part.      Review of Systems   Constitutional: Negative.  Negative for fatigue and fever.   HENT: Negative.  Negative for sore throat and trouble swallowing.    Eyes: Negative.  Negative for redness and visual disturbance.   Respiratory: Negative.  Negative for chest tightness and shortness of breath.    Cardiovascular: Negative.  Negative for chest pain.   Gastrointestinal: Negative.  Negative for abdominal pain, blood in stool and nausea.   Endocrine: Negative.  Negative for cold intolerance, heat intolerance and polyuria.   Genitourinary: Negative.    Musculoskeletal: Negative.  Negative for arthralgias, gait problem and joint swelling.   Integumentary:  Negative for rash. Negative.   Neurological: Negative.  Negative for dizziness, weakness and headaches.   Psychiatric/Behavioral: Negative.  Negative for agitation and dysphoric mood.        Objective:     Vitals:    10/04/22 1056   BP: 128/87   Pulse: 102   Resp: 18   Temp: 98.2 °F (36.8 °C)   SpO2: 98%   Weight: 57.2 kg (126 lb)   Height: 5' 6" (1.676 m)   Body " mass index is 20.34 kg/m².     Physical Exam  Constitutional:       Appearance: Normal appearance.   Eyes:      Conjunctiva/sclera: Conjunctivae normal.   Cardiovascular:      Rate and Rhythm: Normal rate and regular rhythm.   Pulmonary:      Effort: Pulmonary effort is normal.      Breath sounds: Normal breath sounds.   Skin:     General: Skin is warm and dry.   Neurological:      Mental Status: She is alert.   Psychiatric:         Mood and Affect: Mood normal.         Behavior: Behavior normal.         Lab Results   Component Value Date     07/31/2022    K 4.2 07/31/2022    CO2 25 07/31/2022    BUN 4.6 (L) 07/31/2022    CREATININE 0.69 07/31/2022    CALCIUM 8.4 07/31/2022    ALBUMIN 2.6 (L) 07/31/2022    BILITOT 0.3 07/31/2022    ALKPHOS 56 07/31/2022    AST 20 07/31/2022    ALT 11 07/31/2022    EGFRNONAA >60 07/31/2022     Lab Results   Component Value Date    WBC 9.2 07/31/2022    RBC 3.39 (L) 07/31/2022    HGB 9.8 (L) 07/31/2022    HCT 31.4 (L) 07/31/2022    MCV 92.6 07/31/2022    RDW 14.3 07/31/2022     07/31/2022      No results found for: CHOL, TRIG, HDL, LDL, TOTALCHOLEST  Lab Results   Component Value Date    TSH 0.899 04/10/2019     No results found for: HGBA1C, ESTIMATEDAVG       No components found for: VITAMIND    Assessment:     Problem List Items Addressed This Visit          Psychiatric    Anxiety with depression (Chronic)    Current Assessment & Plan     Alprazolam 0.5 mg prescribed to take up to 3 times a day as needed, refills given, take his medication for anxiety disorder.         Relevant Medications    ALPRAZolam (XANAX) 0.5 MG tablet       Pulmonary    COPD (chronic obstructive pulmonary disease) with emphysema (Chronic)    Current Assessment & Plan     Medical condition is currently stable, continue current treatment plan.            Oncology    History of lung cancer (Chronic)     Other Visit Diagnoses       Establishing care with new doctor, encounter for    -  Primary     Patient's medical care has been established in this clinic.  All issues addressed, all questions answered,  with appropriate scheduling of follow-up care.    Wellness examination        Next wellness will be in approximately 6-8 weeks with pre visit labs.    Relevant Orders    CBC Auto Differential    Comprehensive Metabolic Panel    Hemoglobin A1C    Lipid Panel    Abnormal blood chemistry        Relevant Orders    CBC Auto Differential    Comprehensive Metabolic Panel    Hemoglobin A1C    Lipid Panel               Medication List with Changes/Refills   New Medications    ALPRAZOLAM (XANAX) 0.5 MG TABLET    Take 1 tablet (0.5 mg total) by mouth 3 (three) times daily as needed for Anxiety.   Current Medications    ALBUTEROL-IPRATROPIUM (DUO-NEB) 2.5 MG-0.5 MG/3 ML NEBULIZER SOLUTION    Take 3 mLs by nebulization 3 (three) times daily as needed.    ASPIRIN (ECOTRIN) 81 MG EC TABLET    Take 81 mg by mouth once daily.    PROAIR HFA 90 MCG/ACTUATION INHALER    Inhale 2 puffs into the lungs every 6 (six) hours as needed.    SYMBICORT 160-4.5 MCG/ACTUATION HFAA    Inhale 1 puff into the lungs Daily.   Discontinued Medications    ALPRAZOLAM (XANAX) 0.25 MG TABLET    Take 0.25 mg by mouth 2 (two) times daily as needed.    TRAZODONE (DESYREL) 50 MG TABLET    Take 1 tablet by mouth nightly.     Plan:             Follow up in about 6 weeks (around 11/15/2022) for Wellness.

## 2022-10-04 ENCOUNTER — OFFICE VISIT (OUTPATIENT)
Dept: PRIMARY CARE CLINIC | Facility: CLINIC | Age: 62
End: 2022-10-04
Payer: MEDICARE

## 2022-10-04 VITALS
OXYGEN SATURATION: 98 % | RESPIRATION RATE: 18 BRPM | HEART RATE: 102 BPM | HEIGHT: 66 IN | TEMPERATURE: 98 F | WEIGHT: 126 LBS | DIASTOLIC BLOOD PRESSURE: 87 MMHG | BODY MASS INDEX: 20.25 KG/M2 | SYSTOLIC BLOOD PRESSURE: 128 MMHG

## 2022-10-04 DIAGNOSIS — J43.9 PULMONARY EMPHYSEMA, UNSPECIFIED EMPHYSEMA TYPE: ICD-10-CM

## 2022-10-04 DIAGNOSIS — Z76.89 ESTABLISHING CARE WITH NEW DOCTOR, ENCOUNTER FOR: Primary | ICD-10-CM

## 2022-10-04 DIAGNOSIS — Z85.118 HISTORY OF LUNG CANCER: ICD-10-CM

## 2022-10-04 DIAGNOSIS — Z00.00 WELLNESS EXAMINATION: ICD-10-CM

## 2022-10-04 DIAGNOSIS — R79.9 ABNORMAL BLOOD CHEMISTRY: ICD-10-CM

## 2022-10-04 DIAGNOSIS — F41.8 ANXIETY WITH DEPRESSION: Chronic | ICD-10-CM

## 2022-10-04 PROCEDURE — 1160F PR REVIEW ALL MEDS BY PRESCRIBER/CLIN PHARMACIST DOCUMENTED: ICD-10-PCS | Mod: CPTII,,, | Performed by: GENERAL PRACTICE

## 2022-10-04 PROCEDURE — 3079F DIAST BP 80-89 MM HG: CPT | Mod: CPTII,,, | Performed by: GENERAL PRACTICE

## 2022-10-04 PROCEDURE — 99214 PR OFFICE/OUTPT VISIT, EST, LEVL IV, 30-39 MIN: ICD-10-PCS | Mod: ,,, | Performed by: GENERAL PRACTICE

## 2022-10-04 PROCEDURE — 99214 OFFICE O/P EST MOD 30 MIN: CPT | Mod: ,,, | Performed by: GENERAL PRACTICE

## 2022-10-04 PROCEDURE — 3074F PR MOST RECENT SYSTOLIC BLOOD PRESSURE < 130 MM HG: ICD-10-PCS | Mod: CPTII,,, | Performed by: GENERAL PRACTICE

## 2022-10-04 PROCEDURE — 1160F RVW MEDS BY RX/DR IN RCRD: CPT | Mod: CPTII,,, | Performed by: GENERAL PRACTICE

## 2022-10-04 PROCEDURE — 3008F BODY MASS INDEX DOCD: CPT | Mod: CPTII,,, | Performed by: GENERAL PRACTICE

## 2022-10-04 PROCEDURE — 3074F SYST BP LT 130 MM HG: CPT | Mod: CPTII,,, | Performed by: GENERAL PRACTICE

## 2022-10-04 PROCEDURE — 3008F PR BODY MASS INDEX (BMI) DOCUMENTED: ICD-10-PCS | Mod: CPTII,,, | Performed by: GENERAL PRACTICE

## 2022-10-04 PROCEDURE — 1159F MED LIST DOCD IN RCRD: CPT | Mod: CPTII,,, | Performed by: GENERAL PRACTICE

## 2022-10-04 PROCEDURE — 3079F PR MOST RECENT DIASTOLIC BLOOD PRESSURE 80-89 MM HG: ICD-10-PCS | Mod: CPTII,,, | Performed by: GENERAL PRACTICE

## 2022-10-04 PROCEDURE — 1159F PR MEDICATION LIST DOCUMENTED IN MEDICAL RECORD: ICD-10-PCS | Mod: CPTII,,, | Performed by: GENERAL PRACTICE

## 2022-10-04 RX ORDER — ALPRAZOLAM 0.5 MG/1
0.5 TABLET ORAL 3 TIMES DAILY PRN
Qty: 90 TABLET | Refills: 5 | Status: SHIPPED | OUTPATIENT
Start: 2022-10-04 | End: 2023-04-12 | Stop reason: SDUPTHER

## 2022-10-04 NOTE — ASSESSMENT & PLAN NOTE
Alprazolam 0.5 mg prescribed to take up to 3 times a day as needed, refills given, take his medication for anxiety disorder.

## 2022-10-11 ENCOUNTER — OFFICE VISIT (OUTPATIENT)
Dept: ORTHOPEDICS | Facility: CLINIC | Age: 62
End: 2022-10-11
Payer: MEDICARE

## 2022-10-11 ENCOUNTER — HOSPITAL ENCOUNTER (OUTPATIENT)
Dept: RADIOLOGY | Facility: CLINIC | Age: 62
Discharge: HOME | End: 2022-10-11
Attending: ORTHOPAEDIC SURGERY
Payer: MEDICARE

## 2022-10-11 VITALS
SYSTOLIC BLOOD PRESSURE: 114 MMHG | WEIGHT: 126 LBS | HEART RATE: 86 BPM | BODY MASS INDEX: 20.25 KG/M2 | HEIGHT: 66 IN | DIASTOLIC BLOOD PRESSURE: 81 MMHG | TEMPERATURE: 98 F

## 2022-10-11 DIAGNOSIS — S82.874D CLOSED NONDISPLACED PILON FRACTURE OF RIGHT TIBIA WITH ROUTINE HEALING, SUBSEQUENT ENCOUNTER: Primary | ICD-10-CM

## 2022-10-11 DIAGNOSIS — S82.874D CLOSED NONDISPLACED PILON FRACTURE OF RIGHT TIBIA WITH ROUTINE HEALING, SUBSEQUENT ENCOUNTER: ICD-10-CM

## 2022-10-11 PROCEDURE — 73610 XR ANKLE COMPLETE 3 VIEW RIGHT: ICD-10-PCS | Mod: RT,,, | Performed by: ORTHOPAEDIC SURGERY

## 2022-10-11 PROCEDURE — 3079F PR MOST RECENT DIASTOLIC BLOOD PRESSURE 80-89 MM HG: ICD-10-PCS | Mod: CPTII,,, | Performed by: NURSE PRACTITIONER

## 2022-10-11 PROCEDURE — 1159F MED LIST DOCD IN RCRD: CPT | Mod: CPTII,,, | Performed by: NURSE PRACTITIONER

## 2022-10-11 PROCEDURE — 3008F BODY MASS INDEX DOCD: CPT | Mod: CPTII,,, | Performed by: NURSE PRACTITIONER

## 2022-10-11 PROCEDURE — 1160F PR REVIEW ALL MEDS BY PRESCRIBER/CLIN PHARMACIST DOCUMENTED: ICD-10-PCS | Mod: CPTII,,, | Performed by: NURSE PRACTITIONER

## 2022-10-11 PROCEDURE — 99024 POSTOP FOLLOW-UP VISIT: CPT | Mod: ,,, | Performed by: NURSE PRACTITIONER

## 2022-10-11 PROCEDURE — 3008F PR BODY MASS INDEX (BMI) DOCUMENTED: ICD-10-PCS | Mod: CPTII,,, | Performed by: NURSE PRACTITIONER

## 2022-10-11 PROCEDURE — 73610 X-RAY EXAM OF ANKLE: CPT | Mod: RT,,, | Performed by: ORTHOPAEDIC SURGERY

## 2022-10-11 PROCEDURE — 3074F SYST BP LT 130 MM HG: CPT | Mod: CPTII,,, | Performed by: NURSE PRACTITIONER

## 2022-10-11 PROCEDURE — 3079F DIAST BP 80-89 MM HG: CPT | Mod: CPTII,,, | Performed by: NURSE PRACTITIONER

## 2022-10-11 PROCEDURE — 99024 PR POST-OP FOLLOW-UP VISIT: ICD-10-PCS | Mod: ,,, | Performed by: NURSE PRACTITIONER

## 2022-10-11 PROCEDURE — 1159F PR MEDICATION LIST DOCUMENTED IN MEDICAL RECORD: ICD-10-PCS | Mod: CPTII,,, | Performed by: NURSE PRACTITIONER

## 2022-10-11 PROCEDURE — 3074F PR MOST RECENT SYSTOLIC BLOOD PRESSURE < 130 MM HG: ICD-10-PCS | Mod: CPTII,,, | Performed by: NURSE PRACTITIONER

## 2022-10-11 PROCEDURE — 1160F RVW MEDS BY RX/DR IN RCRD: CPT | Mod: CPTII,,, | Performed by: NURSE PRACTITIONER

## 2022-10-11 NOTE — PROGRESS NOTES
Subjective:       Patient ID: Lauren Ring is a 62 y.o. female.    Chief Complaint   Patient presents with    Right Lower Leg - Follow-up     10 weeks post op I&D right tibia, using wheeled scooter to walk, little pain.        The patient is here today for a follow-up evaluation 10 weeks out from irrigation debridement with removal of hardware from the right ankle.  She states she is doing well today.  Her pain is well controlled.  She is ambulating with a walker that she uses for balance.  She states that she is able to walk without any assistive device but likes to have her walker with her as she has COPD and uses the seat on her walker to take frequent breaks.  She states that she has been working on ankle exercises at home.  She has not had any fever or redness/drainage to any of her incisions.  She is happy with her progress today and has no new complaints.      Review of Systems   Constitutional: Negative for chills and fever.   HENT:  Negative for congestion and hearing loss.    Eyes:  Negative for visual disturbance.   Cardiovascular:  Negative for chest pain and syncope.   Respiratory:  Negative for cough and shortness of breath.    Hematologic/Lymphatic: Does not bruise/bleed easily.   Skin:  Negative for color change and rash.   Gastrointestinal:  Negative for abdominal pain, nausea and vomiting.   Genitourinary:  Negative for dysuria and hematuria.   Neurological:  Negative for numbness, sensory change and weakness.   Psychiatric/Behavioral:  Negative for altered mental status.       Current Outpatient Medications on File Prior to Visit   Medication Sig Dispense Refill    albuterol-ipratropium (DUO-NEB) 2.5 mg-0.5 mg/3 mL nebulizer solution Take 3 mLs by nebulization 3 (three) times daily as needed.      ALPRAZolam (XANAX) 0.5 MG tablet Take 1 tablet (0.5 mg total) by mouth 3 (three) times daily as needed for Anxiety. 90 tablet 5    aspirin (ECOTRIN) 81 MG EC tablet Take 81 mg by mouth once daily.    "   PROAIR HFA 90 mcg/actuation inhaler Inhale 2 puffs into the lungs every 6 (six) hours as needed.      SYMBICORT 160-4.5 mcg/actuation HFAA Inhale 1 puff into the lungs Daily.       No current facility-administered medications on file prior to visit.          Objective:      /81 (BP Location: Left arm, Patient Position: Sitting, BP Method: Large (Automatic))   Pulse 86   Temp 98.2 °F (36.8 °C) (Oral)   Ht 5' 6" (1.676 m)   Wt 57.2 kg (126 lb)   LMP  (LMP Unknown)   BMI 20.34 kg/m²   Physical Exam  Constitutional:       General: She is not in acute distress.     Appearance: Normal appearance.   HENT:      Head: Normocephalic and atraumatic.      Mouth/Throat:      Mouth: Mucous membranes are moist.   Eyes:      Extraocular Movements: Extraocular movements intact.   Cardiovascular:      Rate and Rhythm: Normal rate.      Pulses: Normal pulses.   Pulmonary:      Effort: Pulmonary effort is normal. No respiratory distress.   Abdominal:      General: There is no distension.      Palpations: Abdomen is soft.      Tenderness: There is no abdominal tenderness.   Musculoskeletal:      Cervical back: Normal range of motion and neck supple.      Comments: Right ankle:  Her incisions are well healed with no erythema, drainage, dehiscence.  No swelling at the ankle.  No deformity.  Good range of motion without pain or crepitus.  2+ DP pulse.  Good range of motion to the digits.  Brisk capillary refill distally.  Sensation to light touch intact distally.  No calf swelling or tenderness.   Neurological:      Mental Status: She is alert and oriented to person, place, and time. Mental status is at baseline.   Psychiatric:         Mood and Affect: Mood normal.         Behavior: Behavior normal.         Thought Content: Thought content normal.         Judgment: Judgment normal.      Body mass index is 20.34 kg/m².    Radiology:  Three-view x-ray right ankle:  Single screw remains and is intact.  Fractures are well " healed.  No widening of the mortise.  She does have some arthritic changes to the tibiotalar joint.        Assessment:         1. Closed nondisplaced pilon fracture of right tibia with routine healing, subsequent encounter  X-Ray Ankle Complete Right              Plan:     Patient is doing well today.  Incisions are well healed and she has no signs of active infection.  She is ambulating with minimal discomfort.  Continue full activity as tolerated without any formal restrictions.  Continue to work on range of motion and strengthening exercises.  She can wean from her walker as tolerated from the standpoint of her ankle.  We do not need further x-rays and will see her back on an as-needed basis for any issues or concerns.  All questions were addressed.  Patient is happy with her progress and she understands and agrees with the plan.    The above findings, diagnosis, and treatment plan were discussed with Dr. Javan Hassan who is in agreement.      Follow up if symptoms worsen or fail to improve.    Closed nondisplaced pilon fracture of right tibia with routine healing, subsequent encounter  -     X-Ray Ankle Complete Right; Future; Expected date: 10/11/2022              Orders Placed This Encounter   Procedures    X-Ray Ankle Complete Right     Standing Status:   Future     Number of Occurrences:   1     Standing Expiration Date:   10/6/2023     Order Specific Question:   May the Radiologist modify the order per protocol to meet the clinical needs of the patient?     Answer:   Yes     Order Specific Question:   Release to patient     Answer:   Immediate       Future Appointments   Date Time Provider Department Center   12/9/2022  8:00 AM NURSE, [facility] PRIMARY CARE [facility]   12/15/2022 10:30 AM Jose Juan Dunn MD [facility]

## 2022-10-17 ENCOUNTER — EXTERNAL HOME HEALTH (OUTPATIENT)
Dept: HOME HEALTH SERVICES | Facility: HOSPITAL | Age: 62
End: 2022-10-17
Payer: MEDICARE

## 2022-10-28 ENCOUNTER — TELEPHONE (OUTPATIENT)
Dept: PRIMARY CARE CLINIC | Facility: CLINIC | Age: 62
End: 2022-10-28
Payer: MEDICARE

## 2022-10-28 NOTE — TELEPHONE ENCOUNTER
----- Message from Fiona Rolle sent at 10/28/2022  8:26 AM CDT -----  Regarding: Med Advice  .Type:  Needs Medical Advice    Who Called: pt  Symptoms (please be specific): diarrhea and vomiting for 4 days   How long has patient had these symptoms:  4 days   Pharmacy name and phone #:  [id number] [place], [place] - [address] AT [facility] [address] [facility]  Would the patient rather a call back or a response via [facility]? Call Back   Best Call Back Number: [phone]  Additional Information: pt would like a medication to be called in for her symptoms, she also stated she's Pedialyte water and no relief

## 2022-10-28 NOTE — TELEPHONE ENCOUNTER
Called pt and she stated the only thing OTC she has taken was some Pedialyte. She tried Imodium and not relief. Was told to try drinking Sprite to help settle stomach. Was told to let virus run its course. If not feeling better by the weekend [facility].

## 2022-11-29 ENCOUNTER — DOCUMENT SCAN (OUTPATIENT)
Dept: HOME HEALTH SERVICES | Facility: HOSPITAL | Age: 62
End: 2022-11-29
Payer: MEDICARE

## 2022-12-09 ENCOUNTER — CLINICAL SUPPORT (OUTPATIENT)
Dept: PRIMARY CARE CLINIC | Facility: CLINIC | Age: 62
End: 2022-12-09
Payer: MEDICARE

## 2022-12-09 DIAGNOSIS — R79.9 ABNORMAL BLOOD CHEMISTRY: ICD-10-CM

## 2022-12-09 DIAGNOSIS — Z00.00 WELLNESS EXAMINATION: ICD-10-CM

## 2022-12-09 LAB
ALBUMIN SERPL-MCNC: 3.2 GM/DL (ref 3.4–4.8)
ALBUMIN/GLOB SERPL: 0.9 RATIO (ref 1.1–2)
ALP SERPL-CCNC: 108 UNIT/L (ref 40–150)
ALT SERPL-CCNC: 29 UNIT/L (ref 0–55)
AST SERPL-CCNC: 49 UNIT/L (ref 5–34)
BASOPHILS # BLD AUTO: 0.14 X10(3)/MCL (ref 0–0.2)
BASOPHILS NFR BLD AUTO: 1.1 %
BILIRUBIN DIRECT+TOT PNL SERPL-MCNC: 0.8 MG/DL
BUN SERPL-MCNC: 3.8 MG/DL (ref 9.8–20.1)
CALCIUM SERPL-MCNC: 9.5 MG/DL (ref 8.4–10.2)
CHLORIDE SERPL-SCNC: 104 MMOL/L (ref 98–107)
CHOLEST SERPL-MCNC: 191 MG/DL
CHOLEST/HDLC SERPL: 2 {RATIO} (ref 0–5)
CO2 SERPL-SCNC: 23 MMOL/L (ref 23–31)
CREAT SERPL-MCNC: 0.75 MG/DL (ref 0.55–1.02)
EOSINOPHIL # BLD AUTO: 0.13 X10(3)/MCL (ref 0–0.9)
EOSINOPHIL NFR BLD AUTO: 1 %
ERYTHROCYTE [DISTWIDTH] IN BLOOD BY AUTOMATED COUNT: 15.4 % (ref 11.5–17)
EST. AVERAGE GLUCOSE BLD GHB EST-MCNC: 91.1 MG/DL
GFR SERPLBLD CREATININE-BSD FMLA CKD-EPI: >60 MLS/MIN/1.73/M2
GLOBULIN SER-MCNC: 3.5 GM/DL (ref 2.4–3.5)
GLUCOSE SERPL-MCNC: 129 MG/DL (ref 82–115)
HBA1C MFR BLD: 4.8 %
HCT VFR BLD AUTO: 44 % (ref 37–47)
HDLC SERPL-MCNC: 89 MG/DL (ref 35–60)
HGB BLD-MCNC: 14.2 GM/DL (ref 12–16)
IMM GRANULOCYTES # BLD AUTO: 0.04 X10(3)/MCL (ref 0–0.04)
IMM GRANULOCYTES NFR BLD AUTO: 0.3 %
LDLC SERPL CALC-MCNC: 86 MG/DL (ref 50–140)
LYMPHOCYTES # BLD AUTO: 2.93 X10(3)/MCL (ref 0.6–4.6)
LYMPHOCYTES NFR BLD AUTO: 22.8 %
MCH RBC QN AUTO: 27.7 PG (ref 27–31)
MCHC RBC AUTO-ENTMCNC: 32.3 MG/DL (ref 33–36)
MCV RBC AUTO: 85.8 FL (ref 80–94)
MONOCYTES # BLD AUTO: 1.49 X10(3)/MCL (ref 0.1–1.3)
MONOCYTES NFR BLD AUTO: 11.6 %
NEUTROPHILS # BLD AUTO: 8.1 X10(3)/MCL (ref 2.1–9.2)
NEUTROPHILS NFR BLD AUTO: 63.2 %
NRBC BLD AUTO-RTO: 0 %
PLATELET # BLD AUTO: 279 X10(3)/MCL (ref 130–400)
PMV BLD AUTO: 11.8 FL (ref 7.4–10.4)
POTASSIUM SERPL-SCNC: 4.1 MMOL/L (ref 3.5–5.1)
PROT SERPL-MCNC: 6.7 GM/DL (ref 5.8–7.6)
RBC # BLD AUTO: 5.13 X10(6)/MCL (ref 4.2–5.4)
SODIUM SERPL-SCNC: 137 MMOL/L (ref 136–145)
TRIGL SERPL-MCNC: 82 MG/DL (ref 37–140)
VLDLC SERPL CALC-MCNC: 16 MG/DL
WBC # SPEC AUTO: 12.9 X10(3)/MCL (ref 4.5–11.5)

## 2022-12-09 PROCEDURE — 80053 COMPREHEN METABOLIC PANEL: CPT | Performed by: GENERAL PRACTICE

## 2022-12-09 PROCEDURE — 85025 COMPLETE CBC W/AUTO DIFF WBC: CPT | Performed by: GENERAL PRACTICE

## 2022-12-09 PROCEDURE — 80061 LIPID PANEL: CPT | Performed by: GENERAL PRACTICE

## 2022-12-09 PROCEDURE — 36415 COLL VENOUS BLD VENIPUNCTURE: CPT

## 2022-12-09 PROCEDURE — 83036 HEMOGLOBIN GLYCOSYLATED A1C: CPT | Performed by: GENERAL PRACTICE

## 2022-12-09 NOTE — PROGRESS NOTES
Patient presented for nurse visit/Blood draw. 23g needle X 1 attempt in left antecubital.  Patient tolerated procedure well.

## 2022-12-14 NOTE — ASSESSMENT & PLAN NOTE
Patient started on alprazolam to take as needed at last visit approximately two months ago.  She is having some significant breakthrough depression, some of it is situational, she has tried multiple antidepressants in the past but she has never tried a combination SNRI medication, I will prescribe Pristiq 50 mg with a follow-up visit in six weeks.

## 2022-12-14 NOTE — PROGRESS NOTES
Subjective:       Patient ID: Lauren Ring is a 62 y.o. female.    Chief Complaint: Annual Exam and Sinus Problem    Patient presents to the clinic today for wellness examination.  Current and past medical history reviewed.  Pertinent family and social history reviewed.    CRC screening:  CRC screening reviewed.  Cervical cancer screening:  If applicable, cervical cancer screening reviewed.  Breast cancer screening:  If applicable, breast cancer screening reviewed.    Vaccinations due:  Vaccination status reviewed, if due and if desired vaccinations provided in given.    A significant and separate E/M service was performed.  Patient is complaining of assistant sinus pressure, purulent mucus, postnasal drainage.  No fever, no dyspnea, she does have a history of COPD, no significant increase in coughing.  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      Review of Systems   Constitutional: Negative.  Negative for fatigue and fever.   HENT:  Positive for nasal congestion, rhinorrhea and sinus pressure/congestion. Negative for sore throat and trouble swallowing.    Eyes: Negative.  Negative for redness and visual disturbance.   Respiratory: Negative.  Negative for chest tightness and shortness of breath.    Cardiovascular: Negative.  Negative for chest pain.   Gastrointestinal: Negative.  Negative for abdominal pain, blood in stool and nausea.   Endocrine: Negative.  Negative for cold intolerance, heat intolerance and polyuria.   Genitourinary: Negative.    Musculoskeletal: Negative.  Negative for arthralgias, gait problem and joint swelling.   Integumentary:  Negative for rash. Negative.   Neurological: Negative.  Negative for dizziness, weakness and headaches.   Psychiatric/Behavioral: Negative.  Negative for agitation and dysphoric mood.        Objective:     Vitals:    12/15/22 1009   BP: 100/64   Pulse: 97   Resp: 18   Temp: 98.8 °F (37.1 °C)  "  SpO2: 98%   Weight: 56.7 kg (125 lb)   Height: 5' 6" (1.676 m)   Body mass index is 20.18 kg/m².     Physical Exam  Constitutional:       Appearance: Normal appearance.   HENT:      Head: Normocephalic.      Comments: Axillary sinus pain/discomfort     Nose: Congestion present.      Mouth/Throat:      Pharynx: Oropharynx is clear.   Eyes:      Conjunctiva/sclera: Conjunctivae normal.      Pupils: Pupils are equal, round, and reactive to light.   Cardiovascular:      Rate and Rhythm: Normal rate and regular rhythm.      Heart sounds: Normal heart sounds.   Pulmonary:      Effort: Pulmonary effort is normal.      Breath sounds: Normal breath sounds.   Abdominal:      General: Abdomen is flat.      Palpations: Abdomen is soft.   Musculoskeletal:         General: Normal range of motion.      Cervical back: Neck supple.   Skin:     General: Skin is warm and dry.   Neurological:      General: No focal deficit present.      Mental Status: She is oriented to person, place, and time.   Psychiatric:         Mood and Affect: Mood normal.         Behavior: Behavior normal.         Thought Content: Thought content normal.         Judgment: Judgment normal.         Lab Results   Component Value Date     12/09/2022    K 4.1 12/09/2022    CO2 23 12/09/2022    BUN 3.8 (L) 12/09/2022    CREATININE 0.75 12/09/2022    CALCIUM 9.5 12/09/2022    ALBUMIN 3.2 (L) 12/09/2022    BILITOT 0.8 12/09/2022    ALKPHOS 108 12/09/2022    AST 49 (H) 12/09/2022    ALT 29 12/09/2022    EGFRNONAA >60 07/31/2022     Lab Results   Component Value Date    WBC 12.9 (H) 12/09/2022    RBC 5.13 12/09/2022    HGB 14.2 12/09/2022    HCT 44.0 12/09/2022    MCV 85.8 12/09/2022    RDW 15.4 12/09/2022     12/09/2022      Lab Results   Component Value Date    CHOL 191 12/09/2022    TRIG 82 12/09/2022    HDL 89 (H) 12/09/2022    LDL 86.00 12/09/2022    TOTALCHOLEST 2 12/09/2022     Lab Results   Component Value Date    TSH 0.899 04/10/2019     Lab " Results   Component Value Date    HGBA1C 4.8 12/09/2022              Assessment:     Problem List Items Addressed This Visit          Psychiatric    Anxiety with depression (Chronic)    Current Assessment & Plan     Patient started on alprazolam to take as needed at last visit approximately two months ago.  She is having some significant breakthrough depression, some of it is situational, she has tried multiple antidepressants in the past but she has never tried a combination SNRI medication, I will prescribe Pristiq 50 mg with a follow-up visit in six weeks.         Relevant Medications    desvenlafaxine succinate (PRISTIQ) 50 MG Tb24       ENT    Maxillary sinusitis    Current Assessment & Plan     Increased fluid intake.  Start antibiotic today, take until completion.  Ibuprofen or Tylenol for sinus pain/fever as needed.  Delsym with guaifenesin or Mucinex DM as needed for cough and congestion.  Seek further medical care for any further significant problems.         Relevant Medications    betamethasone acetate-betamethasone sodium phosphate injection 9 mg    azithromycin (Z-GIRISH) 250 MG tablet       Pulmonary    COPD (chronic obstructive pulmonary disease) with emphysema (Chronic)    Current Assessment & Plan     Patient on meds, DuoNeb, Symbicort, and ProAir for her COPD.          Other Visit Diagnoses       Wellness examination    -  Primary               Medication List with Changes/Refills   New Medications    AZITHROMYCIN (Z-GIRISH) 250 MG TABLET    Take 2 tablets by mouth on day 1; Take 1 tablet by mouth on days 2-5    DESVENLAFAXINE SUCCINATE (PRISTIQ) 50 MG TB24    Take 1 tablet (50 mg total) by mouth once daily.   Current Medications    ALBUTEROL-IPRATROPIUM (DUO-NEB) 2.5 MG-0.5 MG/3 ML NEBULIZER SOLUTION    Take 3 mLs by nebulization 3 (three) times daily as needed.    ALPRAZOLAM (XANAX) 0.5 MG TABLET    Take 1 tablet (0.5 mg total) by mouth 3 (three) times daily as needed for Anxiety.    ASPIRIN (ECOTRIN)  81 MG EC TABLET    Take 81 mg by mouth once daily.    PROAIR HFA 90 MCG/ACTUATION INHALER    Inhale 2 puffs into the lungs every 6 (six) hours as needed.    SYMBICORT 160-4.5 MCG/ACTUATION HFAA    Inhale 1 puff into the lungs Daily.     Plan:             Follow up in about 6 weeks (around 1/26/2023) for Mood D/O F/up virtual.

## 2022-12-15 ENCOUNTER — OFFICE VISIT (OUTPATIENT)
Dept: PRIMARY CARE CLINIC | Facility: CLINIC | Age: 62
End: 2022-12-15
Payer: MEDICARE

## 2022-12-15 VITALS
SYSTOLIC BLOOD PRESSURE: 100 MMHG | OXYGEN SATURATION: 98 % | WEIGHT: 125 LBS | BODY MASS INDEX: 20.09 KG/M2 | HEIGHT: 66 IN | TEMPERATURE: 99 F | RESPIRATION RATE: 18 BRPM | DIASTOLIC BLOOD PRESSURE: 64 MMHG | HEART RATE: 97 BPM

## 2022-12-15 DIAGNOSIS — Z00.00 WELLNESS EXAMINATION: Primary | ICD-10-CM

## 2022-12-15 DIAGNOSIS — J43.9 PULMONARY EMPHYSEMA, UNSPECIFIED EMPHYSEMA TYPE: Chronic | ICD-10-CM

## 2022-12-15 DIAGNOSIS — F41.8 ANXIETY WITH DEPRESSION: Chronic | ICD-10-CM

## 2022-12-15 DIAGNOSIS — J01.00 ACUTE NON-RECURRENT MAXILLARY SINUSITIS: ICD-10-CM

## 2022-12-15 PROBLEM — J32.0 MAXILLARY SINUSITIS: Status: ACTIVE | Noted: 2022-12-15

## 2022-12-15 PROCEDURE — 3008F BODY MASS INDEX DOCD: CPT | Mod: CPTII,,, | Performed by: GENERAL PRACTICE

## 2022-12-15 PROCEDURE — 1160F RVW MEDS BY RX/DR IN RCRD: CPT | Mod: CPTII,,, | Performed by: GENERAL PRACTICE

## 2022-12-15 PROCEDURE — 1159F MED LIST DOCD IN RCRD: CPT | Mod: CPTII,,, | Performed by: GENERAL PRACTICE

## 2022-12-15 PROCEDURE — 96372 PR INJECTION,THERAP/PROPH/DIAG2ST, IM OR SUBCUT: ICD-10-PCS | Mod: ,,, | Performed by: GENERAL PRACTICE

## 2022-12-15 PROCEDURE — 99213 PR OFFICE/OUTPT VISIT, EST, LEVL III, 20-29 MIN: ICD-10-PCS | Mod: 25,,, | Performed by: GENERAL PRACTICE

## 2022-12-15 PROCEDURE — 3078F DIAST BP <80 MM HG: CPT | Mod: CPTII,,, | Performed by: GENERAL PRACTICE

## 2022-12-15 PROCEDURE — 3074F SYST BP LT 130 MM HG: CPT | Mod: CPTII,,, | Performed by: GENERAL PRACTICE

## 2022-12-15 PROCEDURE — 3008F PR BODY MASS INDEX (BMI) DOCUMENTED: ICD-10-PCS | Mod: CPTII,,, | Performed by: GENERAL PRACTICE

## 2022-12-15 PROCEDURE — 3078F PR MOST RECENT DIASTOLIC BLOOD PRESSURE < 80 MM HG: ICD-10-PCS | Mod: CPTII,,, | Performed by: GENERAL PRACTICE

## 2022-12-15 PROCEDURE — 3074F PR MOST RECENT SYSTOLIC BLOOD PRESSURE < 130 MM HG: ICD-10-PCS | Mod: CPTII,,, | Performed by: GENERAL PRACTICE

## 2022-12-15 PROCEDURE — 99396 PREV VISIT EST AGE 40-64: CPT | Mod: 25,,, | Performed by: GENERAL PRACTICE

## 2022-12-15 PROCEDURE — 99396 PR PREVENTIVE VISIT,EST,40-64: ICD-10-PCS | Mod: 25,,, | Performed by: GENERAL PRACTICE

## 2022-12-15 PROCEDURE — 1159F PR MEDICATION LIST DOCUMENTED IN MEDICAL RECORD: ICD-10-PCS | Mod: CPTII,,, | Performed by: GENERAL PRACTICE

## 2022-12-15 PROCEDURE — 96372 THER/PROPH/DIAG INJ SC/IM: CPT | Mod: ,,, | Performed by: GENERAL PRACTICE

## 2022-12-15 PROCEDURE — 1160F PR REVIEW ALL MEDS BY PRESCRIBER/CLIN PHARMACIST DOCUMENTED: ICD-10-PCS | Mod: CPTII,,, | Performed by: GENERAL PRACTICE

## 2022-12-15 PROCEDURE — 99213 OFFICE O/P EST LOW 20 MIN: CPT | Mod: 25,,, | Performed by: GENERAL PRACTICE

## 2022-12-15 RX ORDER — AZITHROMYCIN 250 MG/1
TABLET, FILM COATED ORAL
Qty: 6 TABLET | Refills: 0 | Status: SHIPPED | OUTPATIENT
Start: 2022-12-15 | End: 2023-01-26

## 2022-12-15 RX ORDER — BETAMETHASONE SODIUM PHOSPHATE AND BETAMETHASONE ACETATE 3; 3 MG/ML; MG/ML
9 INJECTION, SUSPENSION INTRA-ARTICULAR; INTRALESIONAL; INTRAMUSCULAR; SOFT TISSUE
Status: COMPLETED | OUTPATIENT
Start: 2022-12-15 | End: 2022-12-15

## 2022-12-15 RX ORDER — DESVENLAFAXINE SUCCINATE 50 MG/1
50 TABLET, EXTENDED RELEASE ORAL DAILY
Qty: 30 TABLET | Refills: 11 | Status: SHIPPED | OUTPATIENT
Start: 2022-12-15 | End: 2023-01-26

## 2022-12-15 RX ADMIN — BETAMETHASONE SODIUM PHOSPHATE AND BETAMETHASONE ACETATE 9 MG: 3; 3 INJECTION, SUSPENSION INTRA-ARTICULAR; INTRALESIONAL; INTRAMUSCULAR; SOFT TISSUE at 10:12

## 2022-12-15 NOTE — PATIENT INSTRUCTIONS
Increased fluid intake.  Start antibiotic today, take until completion.  Ibuprofen or Tylenol for sinus pain/fever as needed.  Delsym with guaifenesin or Mucinex DM as needed for cough and congestion.  Seek further medical care for any further significant problems.    Start and depressive medication, watch for any significant side effects.  Self monitor for any suicidal thoughts, contact this clinic immediately or seek medical attention immediately if this occurs.  Keep next scheduled appointment to check on the effectiveness of the in a depressive medication

## 2022-12-15 NOTE — ASSESSMENT & PLAN NOTE
Increased fluid intake.  Start antibiotic today, take until completion.  Ibuprofen or Tylenol for sinus pain/fever as needed.  Delsym with guaifenesin or Mucinex DM as needed for cough and congestion.  Seek further medical care for any further significant problems.

## 2022-12-19 ENCOUNTER — TELEPHONE (OUTPATIENT)
Dept: PRIMARY CARE CLINIC | Facility: CLINIC | Age: 62
End: 2022-12-19
Payer: MEDICARE

## 2022-12-19 NOTE — TELEPHONE ENCOUNTER
----- Message from Alyx Garcia sent at 12/19/2022  2:54 PM CST -----  Regarding: advice  Type:  Needs Medical Advice    Who Called: pt   Symptoms (please be specific): headaches  How long has patient had these symptoms:  Thursday last week  Pharmacy name and phone #:    Would the patient rather a call back or a response via [facility]?   Best Call Back Number: [phone]  Additional Information: pt called about desvenlafaxine succinate (PRISTIQ) 50 MG Tb24 giving her headaches right after taking it. She also stated that she had been feeling horrible every since she started taking the meds

## 2022-12-20 DIAGNOSIS — F41.8 ANXIETY WITH DEPRESSION: Primary | Chronic | ICD-10-CM

## 2022-12-20 RX ORDER — VORTIOXETINE 5 MG/1
5 TABLET, FILM COATED ORAL DAILY
Qty: 30 TABLET | Refills: 11 | Status: SHIPPED | OUTPATIENT
Start: 2022-12-20 | End: 2023-12-13 | Stop reason: SDUPTHER

## 2023-01-25 NOTE — PROGRESS NOTES
"Subjective:       Patient ID: Lauren Ring is a 62 y.o. female.    Chief Complaint: Follow-up (Pt states she isn't sleeping well )    Established patient, presents for mood disorder follow-up, anxiety with depression, had been taking alprazolam on occasion for anxiety, having some breakthrough depression, seen in December, prescribed Pristiq 50 mg daily.  Notably, she was switched to Trintellix, she is doing better on that medication.  No problems with her moods.  Notably, she is on home oxygen, only at night, does not have a portable compressor.  She does have dyspnea on exertion, history of COPD and emphysema.  She was seeing a pulmonologist previously, history of lung cancer, had part of her right lung removed, no longer needs to be scan, in fact she would prefer not to go back to see the pulmonologist, which is fine because I will assume the care for her COPD.    Review of Systems   Constitutional: Negative.  Negative for fatigue and fever.   HENT: Negative.  Negative for sore throat and trouble swallowing.    Eyes: Negative.  Negative for redness and visual disturbance.   Respiratory:  Positive for shortness of breath (Dyspnea on exertion). Negative for chest tightness.    Cardiovascular: Negative.  Negative for chest pain.   Gastrointestinal: Negative.  Negative for abdominal pain, blood in stool and nausea.   Endocrine: Negative.  Negative for cold intolerance, heat intolerance and polyuria.   Genitourinary: Negative.    Musculoskeletal: Negative.  Negative for arthralgias, gait problem and joint swelling.   Integumentary:  Negative for rash. Negative.   Neurological: Negative.  Negative for dizziness, weakness and headaches.   Psychiatric/Behavioral: Negative.  Negative for agitation and dysphoric mood.        Objective:     Vitals:    01/26/23 1107   BP: 117/80   Pulse: 105   Resp: 18   SpO2: 98%   Weight: 56.2 kg (124 lb)   Height: 5' 6" (1.676 m)   Body mass index is 20.01 kg/m².     Physical " Exam  Constitutional:       Appearance: Normal appearance.   HENT:      Head: Normocephalic.      Mouth/Throat:      Pharynx: Oropharynx is clear.   Eyes:      Conjunctiva/sclera: Conjunctivae normal.      Pupils: Pupils are equal, round, and reactive to light.   Cardiovascular:      Rate and Rhythm: Normal rate and regular rhythm.      Heart sounds: Normal heart sounds.   Pulmonary:      Effort: Pulmonary effort is normal.      Comments: Overall decreased breath sounds bilaterally, but no wheezes rales or rhonchi  Abdominal:      General: Abdomen is flat.      Palpations: Abdomen is soft.   Musculoskeletal:         General: Normal range of motion.      Cervical back: Neck supple.   Skin:     General: Skin is warm and dry.   Neurological:      General: No focal deficit present.      Mental Status: She is oriented to person, place, and time.   Psychiatric:         Mood and Affect: Mood normal.         Behavior: Behavior normal.         Thought Content: Thought content normal.         Judgment: Judgment normal.         Assessment:     Problem List Items Addressed This Visit          Psychiatric    Anxiety with depression - Primary (Chronic)    Current Assessment & Plan     Improved, continue Trintellix, no change in treatment plan.  Follow-up in six months.            Pulmonary    COPD (chronic obstructive pulmonary disease) with emphysema (Chronic)    Current Assessment & Plan     Refill of her Symbicort and albuterol sent to pharmacy, will continue to follow and we will assume her care for her COPD/emphysema.         Relevant Medications    SYMBICORT 160-4.5 mcg/actuation HFAA    PROAIR HFA 90 mcg/actuation inhaler    On home O2 (Chronic)    Current Assessment & Plan     Patient already on home O2 at night, I will look into getting her a portable unit so that she can use it with activity.            Oncology    History of lung cancer (Chronic)    Current Assessment & Plan     Previously followed by her  pulmonologist, no need for further scans.               Medication List with Changes/Refills   Current Medications    ALBUTEROL-IPRATROPIUM (DUO-NEB) 2.5 MG-0.5 MG/3 ML NEBULIZER SOLUTION    Take 3 mLs by nebulization 3 (three) times daily as needed.    ALPRAZOLAM (XANAX) 0.5 MG TABLET    Take 1 tablet (0.5 mg total) by mouth 3 (three) times daily as needed for Anxiety.    ASPIRIN (ECOTRIN) 81 MG EC TABLET    Take 81 mg by mouth once daily.    VORTIOXETINE (TRINTELLIX) 5 MG TAB    Take 1 tablet (5 mg total) by mouth once daily.   Changed and/or Refilled Medications    Modified Medication Previous Medication    PROAIR HFA 90 MCG/ACTUATION INHALER PROAIR HFA 90 mcg/actuation inhaler       Inhale 2 puffs into the lungs every 4 (four) hours as needed for Shortness of Breath.    Inhale 2 puffs into the lungs every 6 (six) hours as needed.    SYMBICORT 160-4.5 MCG/ACTUATION HFAA SYMBICORT 160-4.5 mcg/actuation HFAA       Inhale 2 puffs into the lungs every 12 (twelve) hours.    Inhale 1 puff into the lungs Daily.   Discontinued Medications    AZITHROMYCIN (Z-GIRISH) 250 MG TABLET    Take 2 tablets by mouth on day 1; Take 1 tablet by mouth on days 2-5    DESVENLAFAXINE SUCCINATE (PRISTIQ) 50 MG TB24    Take 1 tablet (50 mg total) by mouth once daily.     Plan:             Follow up in about 6 months (around 7/26/2023) for Chronic condition F/up.

## 2023-01-26 ENCOUNTER — OFFICE VISIT (OUTPATIENT)
Dept: PRIMARY CARE CLINIC | Facility: CLINIC | Age: 63
End: 2023-01-26
Payer: MEDICARE

## 2023-01-26 VITALS
SYSTOLIC BLOOD PRESSURE: 117 MMHG | OXYGEN SATURATION: 98 % | HEIGHT: 66 IN | RESPIRATION RATE: 18 BRPM | HEART RATE: 105 BPM | BODY MASS INDEX: 19.93 KG/M2 | DIASTOLIC BLOOD PRESSURE: 80 MMHG | WEIGHT: 124 LBS

## 2023-01-26 DIAGNOSIS — J43.9 PULMONARY EMPHYSEMA, UNSPECIFIED EMPHYSEMA TYPE: Chronic | ICD-10-CM

## 2023-01-26 DIAGNOSIS — Z85.118 HISTORY OF LUNG CANCER: Chronic | ICD-10-CM

## 2023-01-26 DIAGNOSIS — F41.8 ANXIETY WITH DEPRESSION: Primary | Chronic | ICD-10-CM

## 2023-01-26 DIAGNOSIS — Z99.81 ON HOME O2: ICD-10-CM

## 2023-01-26 PROCEDURE — 3074F PR MOST RECENT SYSTOLIC BLOOD PRESSURE < 130 MM HG: ICD-10-PCS | Mod: CPTII,,, | Performed by: GENERAL PRACTICE

## 2023-01-26 PROCEDURE — 1159F MED LIST DOCD IN RCRD: CPT | Mod: CPTII,,, | Performed by: GENERAL PRACTICE

## 2023-01-26 PROCEDURE — 3074F SYST BP LT 130 MM HG: CPT | Mod: CPTII,,, | Performed by: GENERAL PRACTICE

## 2023-01-26 PROCEDURE — 3008F BODY MASS INDEX DOCD: CPT | Mod: CPTII,,, | Performed by: GENERAL PRACTICE

## 2023-01-26 PROCEDURE — 3079F DIAST BP 80-89 MM HG: CPT | Mod: CPTII,,, | Performed by: GENERAL PRACTICE

## 2023-01-26 PROCEDURE — 3079F PR MOST RECENT DIASTOLIC BLOOD PRESSURE 80-89 MM HG: ICD-10-PCS | Mod: CPTII,,, | Performed by: GENERAL PRACTICE

## 2023-01-26 PROCEDURE — 1160F RVW MEDS BY RX/DR IN RCRD: CPT | Mod: CPTII,,, | Performed by: GENERAL PRACTICE

## 2023-01-26 PROCEDURE — 1159F PR MEDICATION LIST DOCUMENTED IN MEDICAL RECORD: ICD-10-PCS | Mod: CPTII,,, | Performed by: GENERAL PRACTICE

## 2023-01-26 PROCEDURE — 99214 PR OFFICE/OUTPT VISIT, EST, LEVL IV, 30-39 MIN: ICD-10-PCS | Mod: ,,, | Performed by: GENERAL PRACTICE

## 2023-01-26 PROCEDURE — 99214 OFFICE O/P EST MOD 30 MIN: CPT | Mod: ,,, | Performed by: GENERAL PRACTICE

## 2023-01-26 PROCEDURE — 1160F PR REVIEW ALL MEDS BY PRESCRIBER/CLIN PHARMACIST DOCUMENTED: ICD-10-PCS | Mod: CPTII,,, | Performed by: GENERAL PRACTICE

## 2023-01-26 PROCEDURE — 3008F PR BODY MASS INDEX (BMI) DOCUMENTED: ICD-10-PCS | Mod: CPTII,,, | Performed by: GENERAL PRACTICE

## 2023-01-26 RX ORDER — BUDESONIDE AND FORMOTEROL FUMARATE DIHYDRATE 160; 4.5 UG/1; UG/1
2 AEROSOL RESPIRATORY (INHALATION) EVERY 12 HOURS
Qty: 10.2 G | Refills: 11 | Status: SHIPPED | OUTPATIENT
Start: 2023-01-26 | End: 2024-02-12 | Stop reason: SDUPTHER

## 2023-01-26 RX ORDER — ALBUTEROL SULFATE 90 UG/1
2 AEROSOL, METERED RESPIRATORY (INHALATION) EVERY 4 HOURS PRN
Qty: 18 G | Refills: 11 | Status: SHIPPED | OUTPATIENT
Start: 2023-01-26 | End: 2023-07-27

## 2023-01-26 RX ORDER — ZOLPIDEM TARTRATE 5 MG/1
5 TABLET ORAL NIGHTLY PRN
Qty: 30 TABLET | Refills: 3 | Status: SHIPPED | OUTPATIENT
Start: 2023-01-26 | End: 2023-07-27

## 2023-01-26 NOTE — ASSESSMENT & PLAN NOTE
Patient already on home O2 at night, I will look into getting her a portable unit so that she can use it with activity.

## 2023-01-26 NOTE — ASSESSMENT & PLAN NOTE
Refill of her Symbicort and albuterol sent to pharmacy, will continue to follow and we will assume her care for her COPD/emphysema.

## 2023-01-26 NOTE — TELEPHONE ENCOUNTER
----- Message from Chantelle Hernandez sent at 1/26/2023 11:51 AM CST -----  .Type:  Needs Medical Advice    Who Called: pt    Would the patient rather a call back or a response via [facility]? Call back  Best Call Back Number: [phone]  Additional Information: pt just left office want to know if she told nurse about her sleeping pills .. please call

## 2023-02-01 DIAGNOSIS — J43.9 PULMONARY EMPHYSEMA, UNSPECIFIED EMPHYSEMA TYPE: Primary | Chronic | ICD-10-CM

## 2023-02-01 RX ORDER — ALBUTEROL SULFATE 90 UG/1
2 AEROSOL, METERED RESPIRATORY (INHALATION) EVERY 6 HOURS PRN
Qty: 18 G | Refills: 0 | Status: SHIPPED | OUTPATIENT
Start: 2023-02-01 | End: 2023-03-14 | Stop reason: SDUPTHER

## 2023-02-20 ENCOUNTER — TELEPHONE (OUTPATIENT)
Dept: INTERNAL MEDICINE | Facility: CLINIC | Age: 63
End: 2023-02-20
Payer: MEDICARE

## 2023-02-22 ENCOUNTER — TELEPHONE (OUTPATIENT)
Dept: PRIMARY CARE CLINIC | Facility: CLINIC | Age: 63
End: 2023-02-22
Payer: MEDICARE

## 2023-02-22 NOTE — TELEPHONE ENCOUNTER
Called to schedule pt appt. Pt stated she will have to call us back to see when  she can come in for an appt

## 2023-02-22 NOTE — TELEPHONE ENCOUNTER
----- Message from Jose Juan Dunn MD sent at 2/22/2023  3:47 PM CST -----  Regarding: FW: med adv  Patient probably needs to come in for an evaluation as she is having symptoms of fatigue of any significance  ----- Message -----  From: Jacklyn Kohli LPN  Sent: 2/22/2023   8:06 AM CST  To: Jose Juan Dunn MD  Subject: FW: med adv                                      I think she needs an appointment. How soon should we try to fit her into schedule?  ----- Message -----  From: Jo Ann Arteaga  Sent: 2/20/2023   4:48 PM CST  To: Shaun Manzano Staff  Subject: med adv                                          Type:  Needs Medical Advice    Who Called: Lauren  Symptoms (please be specific): fatigue and drained, lightheaded  How long has patient had these symptoms:  awhile  Pharmacy name and phone #:  [facility] in [place]  Would the patient rather a call back or a response via [facility]?   Best Call Back Number: [phone]  Additional Information: patient is requesting a rx for Iron Pills. Please call patient back.

## 2023-03-14 ENCOUNTER — TELEPHONE (OUTPATIENT)
Dept: PRIMARY CARE CLINIC | Facility: CLINIC | Age: 63
End: 2023-03-14
Payer: MEDICARE

## 2023-03-14 DIAGNOSIS — J43.9 PULMONARY EMPHYSEMA, UNSPECIFIED EMPHYSEMA TYPE: Primary | ICD-10-CM

## 2023-03-14 RX ORDER — ALBUTEROL SULFATE 90 UG/1
2 AEROSOL, METERED RESPIRATORY (INHALATION) EVERY 6 HOURS PRN
Qty: 18 G | Refills: 5 | Status: SHIPPED | OUTPATIENT
Start: 2023-03-14 | End: 2023-09-11 | Stop reason: SDUPTHER

## 2023-03-14 RX ORDER — TRAZODONE HYDROCHLORIDE 150 MG/1
150 TABLET ORAL NIGHTLY
COMMUNITY
Start: 2023-02-19 | End: 2023-07-27

## 2023-03-14 RX ORDER — QUETIAPINE FUMARATE 50 MG/1
50 TABLET, FILM COATED ORAL NIGHTLY
COMMUNITY
Start: 2023-02-19 | End: 2023-07-27

## 2023-03-14 RX ORDER — TRAZODONE HYDROCHLORIDE 100 MG/1
200 TABLET ORAL NIGHTLY PRN
COMMUNITY
Start: 2023-02-19 | End: 2023-07-27

## 2023-03-14 NOTE — TELEPHONE ENCOUNTER
----- Message from Karla Lawson sent at 3/14/2023  2:29 PM CDT -----  Regarding: refill  .Type:  RX Refill Request    Who Called: pt  Refill or New Rx:refill  RX Name and Strength:albuterol (VENTOLIN HFA) 90 mcg/actuation inhaler  How is the patient currently taking it? (ex. 1XDay):  Is this a 30 day or 90 day RX:  Preferred Pharmacy with phone number:[id number] [address] AT [facility]   Phone:  [phone]  Local or Mail Order:local  Ordering Provider:Shaun  Would the patient rather a call back or a response via [facility]? Call back  Best Call Back Number:[phone]  Additional Information: pt needs authorization from  to refill prescription

## 2023-03-22 ENCOUNTER — DOCUMENTATION ONLY (OUTPATIENT)
Dept: PRIMARY CARE CLINIC | Facility: CLINIC | Age: 63
End: 2023-03-22
Payer: MEDICARE

## 2023-04-05 ENCOUNTER — PATIENT OUTREACH (OUTPATIENT)
Dept: ADMINISTRATIVE | Facility: HOSPITAL | Age: 63
End: 2023-04-05
Payer: MEDICARE

## 2023-04-05 NOTE — PROGRESS NOTES
Population Health Outreach.    Attempted to reach pt., answers phone and hangs up, I am not able to leave a message.

## 2023-04-10 ENCOUNTER — TELEPHONE (OUTPATIENT)
Dept: PRIMARY CARE CLINIC | Facility: CLINIC | Age: 63
End: 2023-04-10
Payer: MEDICARE

## 2023-04-10 NOTE — TELEPHONE ENCOUNTER
----- Message from Scarlett Gupta sent at 4/10/2023 12:06 PM CDT -----  .Type:  RX Refill Request    Who Called: pt  Refill or New Rx:refill  RX Name and Strength:ALPRAZolam (XANAX) 0.5 MG tablet  How is the patient currently taking it? (ex. 1XDay):Take 1 tablet (0.5 mg total) by mouth 3 (three) times daily as needed for Anxiety  Is this a 30 day or 90 day RX:90  Preferred Pharmacy with phone number:[facility] #[id number] [place], [place] - [address] AT [facility]  Local or Mail Order:  Ordering Provider:  Would the patient rather a call back or a response via [facility]? toney  Best Call Back Number:[phone]  Additional Information: missed last Rtefill date on the 04/04 called in on 04/09 - Needs script filled

## 2023-04-12 DIAGNOSIS — F41.8 ANXIETY WITH DEPRESSION: Chronic | ICD-10-CM

## 2023-04-13 RX ORDER — ALPRAZOLAM 0.5 MG/1
0.5 TABLET ORAL 3 TIMES DAILY PRN
Qty: 90 TABLET | Refills: 5 | Status: SHIPPED | OUTPATIENT
Start: 2023-04-13 | End: 2023-09-20

## 2023-07-19 ENCOUNTER — TELEPHONE (OUTPATIENT)
Dept: PRIMARY CARE CLINIC | Facility: CLINIC | Age: 63
End: 2023-07-19
Payer: MEDICARE

## 2023-07-19 NOTE — TELEPHONE ENCOUNTER
----- Message from Richard Gabriele sent at 7/19/2023  8:22 AM CDT -----  .Type:  Sooner Apoointment Request    Caller is requesting a sooner appointment.  Caller declined first available appointment listed below.  Caller will not accept being placed on the waitlist and is requesting a message be sent to doctor.  Name of Caller:Emili Rosas  When is the first available appointment?07/27/23  Symptoms:scabies  Would the patient rather a call back or a response via [facility]? Call back  Best Call Back Number:[phone]  Additional Information:

## 2023-07-19 NOTE — TELEPHONE ENCOUNTER
Called  and advised that pcp has no opening. Advised the C. Staff stated that pt will not want to go. Staff stated that pt has scabies and is frequently using the restroom. Advised to staff again that if someone cancels will call for sooner appt

## 2023-07-26 PROBLEM — Z85.118 HISTORY OF LUNG CANCER: Chronic | Status: RESOLVED | Noted: 2022-10-04 | Resolved: 2023-07-26

## 2023-07-26 PROBLEM — J32.0 MAXILLARY SINUSITIS: Status: RESOLVED | Noted: 2022-12-15 | Resolved: 2023-07-26

## 2023-07-26 PROBLEM — C34.90 PRIMARY MALIGNANT NEOPLASM OF LUNG: Status: ACTIVE | Noted: 2023-07-26

## 2023-07-26 PROBLEM — H26.9 CATARACT: Status: ACTIVE | Noted: 2023-07-26

## 2023-07-26 PROBLEM — C34.90 PRIMARY MALIGNANT NEOPLASM OF LUNG: Status: RESOLVED | Noted: 2023-07-26 | Resolved: 2023-07-26

## 2023-07-26 PROBLEM — F41.9 ANXIETY: Status: ACTIVE | Noted: 2023-07-26

## 2023-07-26 PROBLEM — M86.9 OSTEOMYELITIS OF RIGHT ANKLE: Status: ACTIVE | Noted: 2023-07-26

## 2023-07-26 NOTE — ASSESSMENT & PLAN NOTE
Prescribed Trintellix 5 mg daily, alprazolam 0.5 mg 3 times daily, Seroquel 50 mg q.h.s..  Patient reports stability of moods, and effectiveness of medications, including no agitation, significant somnolence, or significant bouts of anxiety or depression.  Patient is not having any sleep disturbance.  Current treatment plan will continue, with plans to discuss any significant changes in patient's status if necessary if anything changes in the future.

## 2023-07-26 NOTE — ASSESSMENT & PLAN NOTE
Prescribed albuterol, DuoNeb, Symbicort.  This medical condition is currently stable on prescription medication, continue current treatment plan.  Refill of DuoNeb sent to pharmacy.

## 2023-07-26 NOTE — PROGRESS NOTES
"Subjective:       Patient ID: Lauren Ring is a 63 y.o. female.    Chief Complaint: Follow-up, Anxiety, and COPD    Patient presents to the clinic today for chronic condition follow-up.  Doing well, no specific complaints, tolerating all medications, reporting no significant side effects or problems.    Last wellness exam was 12/15/2022.      Review of Systems   Constitutional: Negative.  Negative for fatigue and fever.   HENT: Negative.  Negative for sore throat and trouble swallowing.    Eyes: Negative.  Negative for redness and visual disturbance.   Respiratory: Negative.  Negative for chest tightness and shortness of breath.    Cardiovascular: Negative.  Negative for chest pain.   Gastrointestinal: Negative.  Negative for abdominal pain, blood in stool and nausea.   Endocrine: Negative.  Negative for cold intolerance, heat intolerance and polyuria.   Genitourinary: Negative.    Musculoskeletal: Negative.  Negative for arthralgias, gait problem and joint swelling.   Integumentary:  Negative for rash. Negative.   Neurological: Negative.  Negative for dizziness, weakness and headaches.   Psychiatric/Behavioral: Negative.  Negative for agitation and dysphoric mood.        Objective:   Visit Vitals  /73   Pulse 86   Resp 18   Ht 5' 6" (1.676 m)   Wt 54 kg (119 lb)   LMP  (LMP Unknown)   SpO2 97%   BMI 19.21 kg/m²        Physical Exam  Constitutional:       Appearance: Normal appearance.   Eyes:      Conjunctiva/sclera: Conjunctivae normal.   Cardiovascular:      Rate and Rhythm: Normal rate and regular rhythm.   Pulmonary:      Effort: Pulmonary effort is normal.      Breath sounds: Normal breath sounds.   Skin:     General: Skin is warm and dry.   Neurological:      Mental Status: She is alert.   Psychiatric:         Mood and Affect: Mood normal.         Behavior: Behavior normal.         Lab Results   Component Value Date     12/09/2022    K 4.1 12/09/2022    CO2 23 12/09/2022    BUN 3.8 (L) 12/09/2022 "    CREATININE 0.75 12/09/2022    CALCIUM 9.5 12/09/2022    ALBUMIN 3.2 (L) 12/09/2022    BILITOT 0.8 12/09/2022    ALKPHOS 108 12/09/2022    AST 49 (H) 12/09/2022    ALT 29 12/09/2022    EGFRNORACEVR >60 12/09/2022     Lab Results   Component Value Date    WBC 12.9 (H) 12/09/2022    RBC 5.13 12/09/2022    HGB 14.2 12/09/2022    HCT 44.0 12/09/2022    MCV 85.8 12/09/2022    RDW 15.4 12/09/2022     12/09/2022      Lab Results   Component Value Date    CHOL 191 12/09/2022    TRIG 82 12/09/2022    HDL 89 (H) 12/09/2022    LDL 86.00 12/09/2022    TOTALCHOLEST 2 12/09/2022     Lab Results   Component Value Date    TSH 0.899 04/10/2019     Lab Results   Component Value Date    HGBA1C 4.8 12/09/2022          Assessment:     Problem List Items Addressed This Visit          Psychiatric    Anxiety with depression (Chronic)    Current Assessment & Plan     Prescribed Trintellix 5 mg daily, alprazolam 0.5 mg 3 times daily, Seroquel 50 mg q.h.s..  Patient reports stability of moods, and effectiveness of medications, including no agitation, significant somnolence, or significant bouts of anxiety or depression.  Patient is not having any sleep disturbance.  Current treatment plan will continue, with plans to discuss any significant changes in patient's status if necessary if anything changes in the future.            Pulmonary    COPD (chronic obstructive pulmonary disease) with emphysema - Primary (Chronic)    Current Assessment & Plan     Prescribed albuterol, DuoNeb, Symbicort.  This medical condition is currently stable on prescription medication, continue current treatment plan.  Refill of DuoNeb sent to pharmacy.         Relevant Medications    albuterol-ipratropium (DUO-NEB) 2.5 mg-0.5 mg/3 mL nebulizer solution    On home O2 (Chronic)    Current Assessment & Plan     Patient is on home oxygen, especially at night.  We will get her a portable oxygen compressor for her to use.            GI    GERD without esophagitis  (Chronic)    Current Assessment & Plan     Prescribed famotidine, refill given.         Relevant Medications    famotidine (PEPCID) 40 MG tablet     Other Visit Diagnoses       Wellness examination        This diagnosis does not apply to this visit, wellness exam with pre visit labs will be scheduled/ordered for future visit.    Relevant Orders    Comprehensive Metabolic Panel    CBC Auto Differential    Lipid Panel    Hemoglobin A1C    TSH    Hepatitis C Antibody    Abnormal blood chemistry        Most recent comprehensive metabolic panel did show one or more abnormal values, requiring further testing for investigatory purposes.    Relevant Orders    CBC Auto Differential    Hemoglobin A1C               Current Outpatient Medications   Medication Instructions    albuterol (VENTOLIN HFA) 90 mcg/actuation inhaler 2 puffs, Inhalation, Every 6 hours PRN, Rescue    albuterol-ipratropium (DUO-NEB) 2.5 mg-0.5 mg/3 mL nebulizer solution 3 mLs, Nebulization, 3 times daily PRN    ALPRAZolam (XANAX) 0.5 mg, Oral, 3 times daily PRN    aspirin (ECOTRIN) 81 mg, Oral, Daily    famotidine (PEPCID) 40 mg, Oral, Daily    ferrous sulfate (FEOSOL) Tab tablet 1 tablet, Oral, With breakfast    SYMBICORT 160-4.5 mcg/actuation HFAA 2 puffs, Inhalation, Every 12 hours    TRINTELLIX 5 mg, Oral, Daily     Plan:             Follow up in about 5 months (around 12/19/2023) for Wellness.

## 2023-07-26 NOTE — ASSESSMENT & PLAN NOTE
Patient is on home oxygen, especially at night.  We will get her a portable oxygen compressor for her to use.

## 2023-07-27 ENCOUNTER — OFFICE VISIT (OUTPATIENT)
Dept: PRIMARY CARE CLINIC | Facility: CLINIC | Age: 63
End: 2023-07-27
Payer: MEDICARE

## 2023-07-27 VITALS
HEART RATE: 86 BPM | BODY MASS INDEX: 19.13 KG/M2 | DIASTOLIC BLOOD PRESSURE: 73 MMHG | RESPIRATION RATE: 18 BRPM | HEIGHT: 66 IN | OXYGEN SATURATION: 97 % | WEIGHT: 119 LBS | SYSTOLIC BLOOD PRESSURE: 104 MMHG

## 2023-07-27 DIAGNOSIS — J43.9 PULMONARY EMPHYSEMA, UNSPECIFIED EMPHYSEMA TYPE: Primary | Chronic | ICD-10-CM

## 2023-07-27 DIAGNOSIS — Z99.81 ON HOME O2: Chronic | ICD-10-CM

## 2023-07-27 DIAGNOSIS — F41.8 ANXIETY WITH DEPRESSION: Chronic | ICD-10-CM

## 2023-07-27 DIAGNOSIS — R79.9 ABNORMAL BLOOD CHEMISTRY: ICD-10-CM

## 2023-07-27 DIAGNOSIS — K21.9 GERD WITHOUT ESOPHAGITIS: ICD-10-CM

## 2023-07-27 DIAGNOSIS — Z00.00 WELLNESS EXAMINATION: ICD-10-CM

## 2023-07-27 PROBLEM — L98.8 DRAINING CUTANEOUS SINUS TRACT: Status: RESOLVED | Noted: 2022-08-02 | Resolved: 2023-07-27

## 2023-07-27 PROBLEM — S82.874D: Status: RESOLVED | Noted: 2022-08-02 | Resolved: 2023-07-27

## 2023-07-27 PROBLEM — M86.9 OSTEOMYELITIS OF RIGHT ANKLE: Status: RESOLVED | Noted: 2023-07-26 | Resolved: 2023-07-27

## 2023-07-27 PROBLEM — H26.9 CATARACT: Status: RESOLVED | Noted: 2023-07-26 | Resolved: 2023-07-27

## 2023-07-27 PROCEDURE — 99214 PR OFFICE/OUTPT VISIT, EST, LEVL IV, 30-39 MIN: ICD-10-PCS | Mod: ,,, | Performed by: GENERAL PRACTICE

## 2023-07-27 PROCEDURE — 1159F MED LIST DOCD IN RCRD: CPT | Mod: CPTII,,, | Performed by: GENERAL PRACTICE

## 2023-07-27 PROCEDURE — 3074F PR MOST RECENT SYSTOLIC BLOOD PRESSURE < 130 MM HG: ICD-10-PCS | Mod: CPTII,,, | Performed by: GENERAL PRACTICE

## 2023-07-27 PROCEDURE — 3078F PR MOST RECENT DIASTOLIC BLOOD PRESSURE < 80 MM HG: ICD-10-PCS | Mod: CPTII,,, | Performed by: GENERAL PRACTICE

## 2023-07-27 PROCEDURE — 3078F DIAST BP <80 MM HG: CPT | Mod: CPTII,,, | Performed by: GENERAL PRACTICE

## 2023-07-27 PROCEDURE — 1160F PR REVIEW ALL MEDS BY PRESCRIBER/CLIN PHARMACIST DOCUMENTED: ICD-10-PCS | Mod: CPTII,,, | Performed by: GENERAL PRACTICE

## 2023-07-27 PROCEDURE — 3008F PR BODY MASS INDEX (BMI) DOCUMENTED: ICD-10-PCS | Mod: CPTII,,, | Performed by: GENERAL PRACTICE

## 2023-07-27 PROCEDURE — 99214 OFFICE O/P EST MOD 30 MIN: CPT | Mod: ,,, | Performed by: GENERAL PRACTICE

## 2023-07-27 PROCEDURE — 1159F PR MEDICATION LIST DOCUMENTED IN MEDICAL RECORD: ICD-10-PCS | Mod: CPTII,,, | Performed by: GENERAL PRACTICE

## 2023-07-27 PROCEDURE — 3008F BODY MASS INDEX DOCD: CPT | Mod: CPTII,,, | Performed by: GENERAL PRACTICE

## 2023-07-27 PROCEDURE — 1160F RVW MEDS BY RX/DR IN RCRD: CPT | Mod: CPTII,,, | Performed by: GENERAL PRACTICE

## 2023-07-27 PROCEDURE — 3074F SYST BP LT 130 MM HG: CPT | Mod: CPTII,,, | Performed by: GENERAL PRACTICE

## 2023-07-27 RX ORDER — IPRATROPIUM BROMIDE AND ALBUTEROL SULFATE 2.5; .5 MG/3ML; MG/3ML
3 SOLUTION RESPIRATORY (INHALATION) 3 TIMES DAILY PRN
Qty: 75 ML | Refills: 11 | Status: SHIPPED | OUTPATIENT
Start: 2023-07-27 | End: 2024-07-26

## 2023-07-27 RX ORDER — FAMOTIDINE 40 MG/1
40 TABLET, FILM COATED ORAL DAILY
Qty: 90 TABLET | Refills: 3 | Status: SHIPPED | OUTPATIENT
Start: 2023-07-27 | End: 2024-07-26

## 2023-07-27 RX ORDER — FAMOTIDINE 40 MG/1
40 TABLET, FILM COATED ORAL DAILY
COMMUNITY
End: 2023-07-27 | Stop reason: SDUPTHER

## 2023-07-27 RX ORDER — LANOLIN ALCOHOL/MO/W.PET/CERES
1 CREAM (GRAM) TOPICAL
COMMUNITY

## 2023-07-28 ENCOUNTER — TELEPHONE (OUTPATIENT)
Dept: PRIMARY CARE CLINIC | Facility: CLINIC | Age: 63
End: 2023-07-28
Payer: MEDICARE

## 2023-07-28 NOTE — TELEPHONE ENCOUNTER
I spoke to Marta and they stated that patient does not qualify for an portable concentrator due to her resting oxygen not being below 88.

## 2023-07-28 NOTE — TELEPHONE ENCOUNTER
----- Message from Scarlett Gputa sent at 7/27/2023  3:16 PM CDT -----  Regarding: dme  .Type:  Needs Medical Advice    Who Called: [facility]  Best Call Back Number: [phone]  Additional Information: does not qualify

## 2023-08-18 LAB
FIT SCREENING: NORMAL
FIT SCREENING: POSITIVE

## 2023-09-11 ENCOUNTER — TELEPHONE (OUTPATIENT)
Dept: PRIMARY CARE CLINIC | Facility: CLINIC | Age: 63
End: 2023-09-11
Payer: MEDICARE

## 2023-09-11 DIAGNOSIS — J43.9 PULMONARY EMPHYSEMA, UNSPECIFIED EMPHYSEMA TYPE: ICD-10-CM

## 2023-09-11 RX ORDER — ALBUTEROL SULFATE 90 UG/1
2 AEROSOL, METERED RESPIRATORY (INHALATION) EVERY 6 HOURS PRN
Qty: 18 G | Refills: 6 | Status: SHIPPED | OUTPATIENT
Start: 2023-09-11 | End: 2023-09-13

## 2023-09-11 NOTE — TELEPHONE ENCOUNTER
----- Message from Elizabeth Cooper sent at 9/11/2023  9:00 AM CDT -----  .Type:  RX Refill Request    Who Called: pharmacy  Refill or New Rx:refill  RX Name and Strength:albuterol (VENTOLIN HFA) 90 mcg/actuation   How is the patient currently taking it? (ex. 1XDay):  Is this a 30 day or 90 day RX:  Preferred Pharmacy with phone number:[facility]  Local or Mail Order:local  Ordering Provider:Shaun  Would the patient rather a call back or a response via [facility]?   Best Call Back Number:  Additional Information: pharmacy calling for request

## 2023-09-13 ENCOUNTER — TELEPHONE (OUTPATIENT)
Dept: PRIMARY CARE CLINIC | Facility: CLINIC | Age: 63
End: 2023-09-13
Payer: MEDICARE

## 2023-09-13 DIAGNOSIS — J43.9 PULMONARY EMPHYSEMA, UNSPECIFIED EMPHYSEMA TYPE: Primary | Chronic | ICD-10-CM

## 2023-09-13 RX ORDER — ALBUTEROL SULFATE 90 UG/1
2 AEROSOL, METERED RESPIRATORY (INHALATION) EVERY 6 HOURS PRN
Qty: 18 G | Refills: 11 | Status: SHIPPED | OUTPATIENT
Start: 2023-09-13 | End: 2024-09-12

## 2023-09-13 NOTE — TELEPHONE ENCOUNTER
Contacted patient, she state that pharmacy charged her 40$ for inhaler and it is usually 0$. I did start a PA for inhaler

## 2023-09-13 NOTE — TELEPHONE ENCOUNTER
----- Message from Scarlett Gupta sent at 9/13/2023 11:49 AM CDT -----  Regarding: med- price  .Type:  Needs Medical Advice    Who Called:   Symptoms (please be specific):    How long has patient had these symptoms:    Pharmacy name and phone #:    Would the patient rather a call back or a response via [facility]?   Best Call Back Number: [phone]  Additional Information: Issue with med price - pt was told to contact PCP  albuterol (VENTOLIN HFA) 90 mcg/actuation inhaler    [id number] - [facility] [address] AT [facility] OF [facility]

## 2023-09-18 PROBLEM — F51.01 PRIMARY INSOMNIA: Chronic | Status: ACTIVE | Noted: 2023-09-18

## 2023-09-18 NOTE — PROGRESS NOTES
"Subjective:       Patient ID: Lauren Ring is a 63 y.o. female.    Chief Complaint: Insomnia    Established patient, presents to the clinic to discuss insomnia.  Apparently, she has had a long history of insomnia, I have never spoken much about it with her.  She does take alprazolam 0.5 mg t.i.d..  I prescribed Ambien 5 mg, did not work at all.  She has been on other medications such as Seroquel, with no effect.  She does remember taking Xanax at a different dose, although she does not remember the dosage, and it did possibly help her to sleep.      Review of Systems   Constitutional: Negative.  Negative for fatigue and fever.   HENT: Negative.  Negative for sore throat and trouble swallowing.    Eyes: Negative.  Negative for redness and visual disturbance.   Respiratory: Negative.  Negative for chest tightness and shortness of breath.    Cardiovascular: Negative.  Negative for chest pain.   Gastrointestinal: Negative.  Negative for abdominal pain, blood in stool and nausea.   Endocrine: Negative.  Negative for cold intolerance, heat intolerance and polyuria.   Genitourinary: Negative.    Musculoskeletal: Negative.  Negative for arthralgias, gait problem and joint swelling.   Integumentary:  Negative for rash. Negative.   Neurological: Negative.  Negative for dizziness, weakness and headaches.   Psychiatric/Behavioral:  Positive for sleep disturbance. Negative for agitation and dysphoric mood.          Objective:     Vitals:    09/20/23 1102   BP: 122/83   Pulse: 88   Resp: 18   SpO2: 98%   Weight: 51.7 kg (114 lb)   Height: 5' 6" (1.676 m)   Body mass index is 18.4 kg/m².     Physical Exam  Constitutional:       Appearance: Normal appearance.   Cardiovascular:      Rate and Rhythm: Normal rate.   Pulmonary:      Effort: Pulmonary effort is normal.   Psychiatric:         Mood and Affect: Mood normal.         Behavior: Behavior normal.         Thought Content: Thought content normal.         Judgment: Judgment " normal.           Assessment:     Problem List Items Addressed This Visit          GI    Positive FIT (fecal immunochemical test) (Chronic)    Overview     Positive FIT test, done at home through her insurance.         Current Assessment & Plan     Patient had a positive FIT test done through her insurance, does not want to investigate this further at this time.            Other    Primary insomnia - Primary (Chronic)    Overview     Prescribed alprazolam 0.5 mg t.i.d., but she will take two at night for insomnia.         Current Assessment & Plan     Take medication as prescribed for insomnia, we will check on the status of this at her next visit.         Relevant Medications    ALPRAZolam (XANAX) 0.5 MG tablet          Current Outpatient Medications   Medication Instructions    albuterol (PROAIR HFA) 90 mcg/actuation inhaler 2 puffs, Inhalation, Every 6 hours PRN    albuterol-ipratropium (DUO-NEB) 2.5 mg-0.5 mg/3 mL nebulizer solution 3 mLs, Nebulization, 3 times daily PRN    ALPRAZolam (XANAX) 0.5 MG tablet Take one tablet in the morning, one tablet in the late afternoon, and two tablets each evening before bedtime.    aspirin (ECOTRIN) 81 mg, Oral, Daily    famotidine (PEPCID) 40 mg, Oral, Daily    ferrous sulfate (FEOSOL) Tab tablet 1 tablet, Oral, With breakfast    SYMBICORT 160-4.5 mcg/actuation HFAA 2 puffs, Inhalation, Every 12 hours    TRINTELLIX 5 mg, Oral, Daily     Plan:             Next follow-up is a wellness exam in December.

## 2023-09-20 ENCOUNTER — OFFICE VISIT (OUTPATIENT)
Dept: PRIMARY CARE CLINIC | Facility: CLINIC | Age: 63
End: 2023-09-20
Payer: MEDICARE

## 2023-09-20 VITALS
DIASTOLIC BLOOD PRESSURE: 83 MMHG | RESPIRATION RATE: 18 BRPM | HEIGHT: 66 IN | OXYGEN SATURATION: 98 % | WEIGHT: 114 LBS | SYSTOLIC BLOOD PRESSURE: 122 MMHG | BODY MASS INDEX: 18.32 KG/M2 | HEART RATE: 88 BPM

## 2023-09-20 DIAGNOSIS — F51.01 PRIMARY INSOMNIA: Primary | Chronic | ICD-10-CM

## 2023-09-20 DIAGNOSIS — R19.5 POSITIVE FIT (FECAL IMMUNOCHEMICAL TEST): ICD-10-CM

## 2023-09-20 PROCEDURE — 1159F PR MEDICATION LIST DOCUMENTED IN MEDICAL RECORD: ICD-10-PCS | Mod: CPTII,,, | Performed by: GENERAL PRACTICE

## 2023-09-20 PROCEDURE — 3074F PR MOST RECENT SYSTOLIC BLOOD PRESSURE < 130 MM HG: ICD-10-PCS | Mod: CPTII,,, | Performed by: GENERAL PRACTICE

## 2023-09-20 PROCEDURE — 3079F PR MOST RECENT DIASTOLIC BLOOD PRESSURE 80-89 MM HG: ICD-10-PCS | Mod: CPTII,,, | Performed by: GENERAL PRACTICE

## 2023-09-20 PROCEDURE — 3079F DIAST BP 80-89 MM HG: CPT | Mod: CPTII,,, | Performed by: GENERAL PRACTICE

## 2023-09-20 PROCEDURE — 1160F PR REVIEW ALL MEDS BY PRESCRIBER/CLIN PHARMACIST DOCUMENTED: ICD-10-PCS | Mod: CPTII,,, | Performed by: GENERAL PRACTICE

## 2023-09-20 PROCEDURE — 3008F BODY MASS INDEX DOCD: CPT | Mod: CPTII,,, | Performed by: GENERAL PRACTICE

## 2023-09-20 PROCEDURE — 1160F RVW MEDS BY RX/DR IN RCRD: CPT | Mod: CPTII,,, | Performed by: GENERAL PRACTICE

## 2023-09-20 PROCEDURE — 3008F PR BODY MASS INDEX (BMI) DOCUMENTED: ICD-10-PCS | Mod: CPTII,,, | Performed by: GENERAL PRACTICE

## 2023-09-20 PROCEDURE — 99213 PR OFFICE/OUTPT VISIT, EST, LEVL III, 20-29 MIN: ICD-10-PCS | Mod: ,,, | Performed by: GENERAL PRACTICE

## 2023-09-20 PROCEDURE — 1159F MED LIST DOCD IN RCRD: CPT | Mod: CPTII,,, | Performed by: GENERAL PRACTICE

## 2023-09-20 PROCEDURE — 3074F SYST BP LT 130 MM HG: CPT | Mod: CPTII,,, | Performed by: GENERAL PRACTICE

## 2023-09-20 PROCEDURE — 99213 OFFICE O/P EST LOW 20 MIN: CPT | Mod: ,,, | Performed by: GENERAL PRACTICE

## 2023-09-20 RX ORDER — ALPRAZOLAM 0.5 MG/1
TABLET ORAL
Qty: 120 TABLET | Refills: 5 | Status: SHIPPED | OUTPATIENT
Start: 2023-09-20

## 2023-09-20 NOTE — ASSESSMENT & PLAN NOTE
Patient had a positive FIT test done through her insurance, does not want to investigate this further at this time.

## 2023-09-20 NOTE — ASSESSMENT & PLAN NOTE
Take medication as prescribed for insomnia, we will check on the status of this at her next visit.

## 2023-12-13 DIAGNOSIS — F41.8 ANXIETY WITH DEPRESSION: Chronic | ICD-10-CM

## 2023-12-13 RX ORDER — VORTIOXETINE 5 MG/1
5 TABLET, FILM COATED ORAL DAILY
Qty: 90 TABLET | Refills: 3 | Status: SHIPPED | OUTPATIENT
Start: 2023-12-13 | End: 2024-12-12

## 2023-12-14 ENCOUNTER — CLINICAL SUPPORT (OUTPATIENT)
Dept: PRIMARY CARE CLINIC | Facility: CLINIC | Age: 63
End: 2023-12-14
Payer: MEDICARE

## 2023-12-14 DIAGNOSIS — R79.9 ABNORMAL BLOOD CHEMISTRY: ICD-10-CM

## 2023-12-14 DIAGNOSIS — Z00.00 WELLNESS EXAMINATION: ICD-10-CM

## 2023-12-14 LAB
ALBUMIN SERPL-MCNC: 3.5 G/DL (ref 3.4–4.8)
ALBUMIN/GLOB SERPL: 1.1 RATIO (ref 1.1–2)
ALP SERPL-CCNC: 84 UNIT/L (ref 40–150)
ALT SERPL-CCNC: 15 UNIT/L (ref 0–55)
AST SERPL-CCNC: 27 UNIT/L (ref 5–34)
BASOPHILS # BLD AUTO: 0.1 X10(3)/MCL
BASOPHILS NFR BLD AUTO: 1.2 %
BILIRUB SERPL-MCNC: 0.8 MG/DL
BUN SERPL-MCNC: 5.1 MG/DL (ref 9.8–20.1)
CALCIUM SERPL-MCNC: 9.4 MG/DL (ref 8.4–10.2)
CHLORIDE SERPL-SCNC: 107 MMOL/L (ref 98–107)
CHOLEST SERPL-MCNC: 221 MG/DL
CHOLEST/HDLC SERPL: 2 {RATIO} (ref 0–5)
CO2 SERPL-SCNC: 28 MMOL/L (ref 23–31)
CREAT SERPL-MCNC: 0.76 MG/DL (ref 0.55–1.02)
EOSINOPHIL # BLD AUTO: 0.2 X10(3)/MCL (ref 0–0.9)
EOSINOPHIL NFR BLD AUTO: 2.5 %
ERYTHROCYTE [DISTWIDTH] IN BLOOD BY AUTOMATED COUNT: 18.6 % (ref 11.5–17)
EST. AVERAGE GLUCOSE BLD GHB EST-MCNC: 91.1 MG/DL
GFR SERPLBLD CREATININE-BSD FMLA CKD-EPI: >60 MLS/MIN/1.73/M2
GLOBULIN SER-MCNC: 3.3 GM/DL (ref 2.4–3.5)
GLUCOSE SERPL-MCNC: 106 MG/DL (ref 82–115)
HBA1C MFR BLD: 4.8 %
HCT VFR BLD AUTO: 41.1 % (ref 37–47)
HDLC SERPL-MCNC: 110 MG/DL (ref 35–60)
HGB BLD-MCNC: 13.1 G/DL (ref 12–16)
IMM GRANULOCYTES # BLD AUTO: 0.02 X10(3)/MCL (ref 0–0.04)
IMM GRANULOCYTES NFR BLD AUTO: 0.2 %
LDLC SERPL CALC-MCNC: 89 MG/DL (ref 50–140)
LYMPHOCYTES # BLD AUTO: 2.9 X10(3)/MCL (ref 0.6–4.6)
LYMPHOCYTES NFR BLD AUTO: 35.7 %
MCH RBC QN AUTO: 27.9 PG (ref 27–31)
MCHC RBC AUTO-ENTMCNC: 31.9 G/DL (ref 33–36)
MCV RBC AUTO: 87.6 FL (ref 80–94)
MONOCYTES # BLD AUTO: 0.87 X10(3)/MCL (ref 0.1–1.3)
MONOCYTES NFR BLD AUTO: 10.7 %
NEUTROPHILS # BLD AUTO: 4.03 X10(3)/MCL (ref 2.1–9.2)
NEUTROPHILS NFR BLD AUTO: 49.7 %
NRBC BLD AUTO-RTO: 0 %
PLATELET # BLD AUTO: 233 X10(3)/MCL (ref 130–400)
PMV BLD AUTO: 10.2 FL (ref 7.4–10.4)
POTASSIUM SERPL-SCNC: 4.4 MMOL/L (ref 3.5–5.1)
PROT SERPL-MCNC: 6.8 GM/DL (ref 5.8–7.6)
RBC # BLD AUTO: 4.69 X10(6)/MCL (ref 4.2–5.4)
SODIUM SERPL-SCNC: 141 MMOL/L (ref 136–145)
TRIGL SERPL-MCNC: 112 MG/DL (ref 37–140)
TSH SERPL-ACNC: 2.44 UIU/ML (ref 0.35–4.94)
VLDLC SERPL CALC-MCNC: 22 MG/DL
WBC # SPEC AUTO: 8.12 X10(3)/MCL (ref 4.5–11.5)

## 2023-12-14 PROCEDURE — 80053 COMPREHEN METABOLIC PANEL: CPT | Performed by: GENERAL PRACTICE

## 2023-12-14 PROCEDURE — 36415 COLL VENOUS BLD VENIPUNCTURE: CPT | Mod: ,,, | Performed by: GENERAL PRACTICE

## 2023-12-14 PROCEDURE — 36415 COLL VENOUS BLD VENIPUNCTURE: CPT

## 2023-12-14 PROCEDURE — 36415 PR COLLECTION VENOUS BLOOD,VENIPUNCTURE: ICD-10-PCS | Mod: ,,, | Performed by: GENERAL PRACTICE

## 2023-12-14 PROCEDURE — 80061 LIPID PANEL: CPT | Performed by: GENERAL PRACTICE

## 2023-12-14 PROCEDURE — 83036 HEMOGLOBIN GLYCOSYLATED A1C: CPT | Performed by: GENERAL PRACTICE

## 2023-12-14 PROCEDURE — 85025 COMPLETE CBC W/AUTO DIFF WBC: CPT | Performed by: GENERAL PRACTICE

## 2023-12-14 PROCEDURE — 86803 HEPATITIS C AB TEST: CPT | Performed by: GENERAL PRACTICE

## 2023-12-14 PROCEDURE — 84443 ASSAY THYROID STIM HORMONE: CPT | Performed by: GENERAL PRACTICE

## 2023-12-14 NOTE — PROGRESS NOTES
Patient verified name and .Patient here for nurse visit to draw AW labs with (22) gauge needle. Patient was drawn in right antecubital .No complications or issues occurred. Patient tolerated venipuncture well. Patient expressed no questions or concerns.

## 2023-12-15 LAB — HCV AB SERPL QL IA: REACTIVE

## 2023-12-18 NOTE — PROGRESS NOTES
"Subjective:       Patient ID: Lauren Ring is a 63 y.o. female.    Chief Complaint: Annual Exam    Patient presents to the clinic today for chronic condition follow-up.  Doing well, no specific complaints, tolerating all medications, reporting no significant side effects or problems.    Last wellness exam was 12/15/2022.    Chronic conditions being treated include but are not limited to anxiety with depression, primary insomnia, COPD, GERD.        Review of Systems   Constitutional: Negative.  Negative for fatigue and fever.   HENT: Negative.  Negative for sore throat and trouble swallowing.    Eyes: Negative.  Negative for redness and visual disturbance.   Respiratory: Negative.  Negative for chest tightness and shortness of breath.    Cardiovascular: Negative.  Negative for chest pain.   Gastrointestinal: Negative.  Negative for abdominal pain, blood in stool and nausea.   Endocrine: Negative.  Negative for cold intolerance, heat intolerance and polyuria.   Genitourinary: Negative.    Musculoskeletal: Negative.  Negative for arthralgias, gait problem and joint swelling.   Integumentary:  Negative for rash. Negative.   Neurological: Negative.  Negative for dizziness, weakness and headaches.   Psychiatric/Behavioral: Negative.  Negative for agitation and dysphoric mood.            Patient Care Team:  Jose Juan Dunn MD as PCP - General (Family Medicine)  Javan Hassan DO as Consulting Physician (Orthopedic Surgery)  Santana Braga MD as Consulting Physician (Pulmonary Disease)  [facility]  Objective:   Visit Vitals  /85   Pulse (!) 118   Resp 18   Ht 5' 6" (1.676 m)   Wt 52.6 kg (116 lb)   LMP  (LMP Unknown)   SpO2 97%   BMI 18.72 kg/m²        Physical Exam  Constitutional:       Appearance: Normal appearance.   HENT:      Head: Normocephalic.      Mouth/Throat:      Mouth: Mucous membranes are moist.      Pharynx: Oropharynx is clear.   Eyes:      Conjunctiva/sclera: Conjunctivae normal.      Pupils: Pupils " are equal, round, and reactive to light.   Cardiovascular:      Rate and Rhythm: Normal rate and regular rhythm.      Heart sounds: Normal heart sounds.   Pulmonary:      Effort: Pulmonary effort is normal.      Breath sounds: Normal breath sounds.   Abdominal:      General: Abdomen is flat.      Palpations: Abdomen is soft.   Musculoskeletal:         General: Normal range of motion.      Cervical back: Neck supple.   Skin:     General: Skin is warm and dry.   Neurological:      General: No focal deficit present.      Mental Status: She is alert and oriented to person, place, and time.   Psychiatric:         Mood and Affect: Mood normal.         Behavior: Behavior normal.         Thought Content: Thought content normal.         Judgment: Judgment normal.           Lab Results   Component Value Date     12/14/2023    K 4.4 12/14/2023    CO2 28 12/14/2023    BUN 5.1 (L) 12/14/2023    CREATININE 0.76 12/14/2023    CALCIUM 9.4 12/14/2023    ALBUMIN 3.5 12/14/2023    BILITOT 0.8 12/14/2023    ALKPHOS 84 12/14/2023    AST 27 12/14/2023    ALT 15 12/14/2023    EGFRNORACEVR >60 12/14/2023     Lab Results   Component Value Date    WBC 8.12 12/14/2023    RBC 4.69 12/14/2023    HGB 13.1 12/14/2023    HCT 41.1 12/14/2023    MCV 87.6 12/14/2023    RDW 18.6 (H) 12/14/2023     12/14/2023      Lab Results   Component Value Date    CHOL 221 (H) 12/14/2023    TRIG 112 12/14/2023     (H) 12/14/2023    LDL 89.00 12/14/2023    TOTALCHOLEST 2 12/14/2023     Lab Results   Component Value Date    TSH 2.439 12/14/2023     Lab Results   Component Value Date    HGBA1C 4.8 12/14/2023     Assessment:       ICD-10-CM ICD-9-CM   1. Wellness examination  Z00.00 V70.0   2. Anxiety with depression  F41.8 300.4   3. Pulmonary emphysema, unspecified emphysema type  J43.9 492.8   4. Primary insomnia  F51.01 307.42   5. GERD without esophagitis  K21.9 530.81       Current Outpatient Medications   Medication Instructions    albuterol  (PROAIR HFA) 90 mcg/actuation inhaler 2 puffs, Inhalation, Every 6 hours PRN    albuterol-ipratropium (DUO-NEB) 2.5 mg-0.5 mg/3 mL nebulizer solution 3 mLs, Nebulization, 3 times daily PRN    ALPRAZolam (XANAX) 0.5 MG tablet Take one tablet in the morning, one tablet in the late afternoon, and two tablets each evening before bedtime.    aspirin (ECOTRIN) 81 mg, Oral, Daily    famotidine (PEPCID) 40 mg, Oral, Daily    ferrous sulfate (FEOSOL) Tab tablet 1 tablet, Oral, With breakfast    SYMBICORT 160-4.5 mcg/actuation HFAA 2 puffs, Inhalation, Every 12 hours    TRINTELLIX 5 mg, Oral, Daily     Plan:     Problem List Items Addressed This Visit          Psychiatric    Anxiety with depression (Chronic)    Overview     Prescribed Trintellix 5 mg daily, alprazolam 0.5 mg.  T.i.d., takes two at night for insomnia.         Current Assessment & Plan     Patient reports stability of moods, and effectiveness of medications, including no agitation, significant somnolence, or significant bouts of anxiety or depression.  Patient is not having any sleep disturbance.  Current treatment plan will continue, with plans to discuss any significant changes in patient's status if necessary if anything changes in the future.            Pulmonary    COPD (chronic obstructive pulmonary disease) with emphysema (Chronic)    Overview     Prescribed albuterol, DuoNeb, Symbicort.  She does see pulmonology, Dr. Braga.            GI    GERD without esophagitis (Chronic)    Overview     Takes Pepcid 40 mg daily.         Current Assessment & Plan     This medical condition is currently stable on prescription medication, continue current treatment plan.            Other    Primary insomnia (Chronic)    Overview     Prescribed alprazolam 0.5 mg t.i.d., but she will take two at night for insomnia.         Current Assessment & Plan     This medical condition is currently stable on prescription medication, continue current treatment plan.           Other Visit Diagnoses       Wellness examination    -  Primary    Overall health status was reviewed.  Good health habits reinforced.  Annual wellness exams recommended.                    Follow up in about 6 months (around 6/19/2024) for Chronic condition F/up.

## 2023-12-19 ENCOUNTER — OFFICE VISIT (OUTPATIENT)
Dept: PRIMARY CARE CLINIC | Facility: CLINIC | Age: 63
End: 2023-12-19
Payer: MEDICARE

## 2023-12-19 VITALS
SYSTOLIC BLOOD PRESSURE: 121 MMHG | HEART RATE: 118 BPM | OXYGEN SATURATION: 97 % | DIASTOLIC BLOOD PRESSURE: 85 MMHG | HEIGHT: 66 IN | RESPIRATION RATE: 18 BRPM | WEIGHT: 116 LBS | BODY MASS INDEX: 18.64 KG/M2

## 2023-12-19 DIAGNOSIS — Z00.00 WELLNESS EXAMINATION: Primary | ICD-10-CM

## 2023-12-19 DIAGNOSIS — F51.01 PRIMARY INSOMNIA: Chronic | ICD-10-CM

## 2023-12-19 DIAGNOSIS — K21.9 GERD WITHOUT ESOPHAGITIS: Chronic | ICD-10-CM

## 2023-12-19 DIAGNOSIS — J43.9 PULMONARY EMPHYSEMA, UNSPECIFIED EMPHYSEMA TYPE: Chronic | ICD-10-CM

## 2023-12-19 DIAGNOSIS — F41.8 ANXIETY WITH DEPRESSION: Chronic | ICD-10-CM

## 2023-12-19 PROCEDURE — 3074F SYST BP LT 130 MM HG: CPT | Mod: CPTII,,, | Performed by: GENERAL PRACTICE

## 2023-12-19 PROCEDURE — 1160F PR REVIEW ALL MEDS BY PRESCRIBER/CLIN PHARMACIST DOCUMENTED: ICD-10-PCS | Mod: CPTII,,, | Performed by: GENERAL PRACTICE

## 2023-12-19 PROCEDURE — 3079F DIAST BP 80-89 MM HG: CPT | Mod: CPTII,,, | Performed by: GENERAL PRACTICE

## 2023-12-19 PROCEDURE — 3044F PR MOST RECENT HEMOGLOBIN A1C LEVEL <7.0%: ICD-10-PCS | Mod: CPTII,,, | Performed by: GENERAL PRACTICE

## 2023-12-19 PROCEDURE — 1159F PR MEDICATION LIST DOCUMENTED IN MEDICAL RECORD: ICD-10-PCS | Mod: CPTII,,, | Performed by: GENERAL PRACTICE

## 2023-12-19 PROCEDURE — 3008F PR BODY MASS INDEX (BMI) DOCUMENTED: ICD-10-PCS | Mod: CPTII,,, | Performed by: GENERAL PRACTICE

## 2023-12-19 PROCEDURE — 99396 PR PREVENTIVE VISIT,EST,40-64: ICD-10-PCS | Mod: ,,, | Performed by: GENERAL PRACTICE

## 2023-12-19 PROCEDURE — 3008F BODY MASS INDEX DOCD: CPT | Mod: CPTII,,, | Performed by: GENERAL PRACTICE

## 2023-12-19 PROCEDURE — 3074F PR MOST RECENT SYSTOLIC BLOOD PRESSURE < 130 MM HG: ICD-10-PCS | Mod: CPTII,,, | Performed by: GENERAL PRACTICE

## 2023-12-19 PROCEDURE — 99396 PREV VISIT EST AGE 40-64: CPT | Mod: ,,, | Performed by: GENERAL PRACTICE

## 2023-12-19 PROCEDURE — 1159F MED LIST DOCD IN RCRD: CPT | Mod: CPTII,,, | Performed by: GENERAL PRACTICE

## 2023-12-19 PROCEDURE — 3044F HG A1C LEVEL LT 7.0%: CPT | Mod: CPTII,,, | Performed by: GENERAL PRACTICE

## 2023-12-19 PROCEDURE — 1160F RVW MEDS BY RX/DR IN RCRD: CPT | Mod: CPTII,,, | Performed by: GENERAL PRACTICE

## 2023-12-19 PROCEDURE — 3079F PR MOST RECENT DIASTOLIC BLOOD PRESSURE 80-89 MM HG: ICD-10-PCS | Mod: CPTII,,, | Performed by: GENERAL PRACTICE

## 2024-01-03 ENCOUNTER — TELEPHONE (OUTPATIENT)
Dept: PRIMARY CARE CLINIC | Facility: CLINIC | Age: 64
End: 2024-01-03
Payer: MEDICARE

## 2024-01-03 NOTE — TELEPHONE ENCOUNTER
----- Message from Alyx Garcia sent at 1/3/2024 11:28 AM CST -----  Regarding: advice  Type:  Needs Medical Advice    Who Called: pt    Best Call Back Number: [phone]  Additional Information: pt called about receiving a bill for her labs and she wanted to know why she has something to pay. Please advise

## 2024-01-03 NOTE — TELEPHONE ENCOUNTER
Tried to contact pt to discuss bill details. Tried to give pt number to billing dept. Pt tried to contact them with no success.Went to discuss with pt and phone line cut out on us.

## 2024-02-12 ENCOUNTER — TELEPHONE (OUTPATIENT)
Dept: PRIMARY CARE CLINIC | Facility: CLINIC | Age: 64
End: 2024-02-12
Payer: MEDICARE

## 2024-02-12 DIAGNOSIS — J43.9 PULMONARY EMPHYSEMA, UNSPECIFIED EMPHYSEMA TYPE: Chronic | ICD-10-CM

## 2024-02-12 RX ORDER — BUDESONIDE AND FORMOTEROL FUMARATE DIHYDRATE 160; 4.5 UG/1; UG/1
2 AEROSOL RESPIRATORY (INHALATION) EVERY 12 HOURS
Qty: 10.2 G | Refills: 11 | Status: SHIPPED | OUTPATIENT
Start: 2024-02-12 | End: 2025-02-11

## 2024-02-12 NOTE — TELEPHONE ENCOUNTER
----- Message from Alicia Sam sent at 2/12/2024  2:57 PM CST -----  Regarding: refill request  .Type:  RX Refill Request    Who Called: [facility]    Refill or New Rx:Refill    RX Name and Strength:SYMBICORT 160-4.5 mcg/actuation HFAA 10.2 g 11 1/26/2023 1/26/2024 Yes  Sig - Route: Inhale 2 puffs into the lungs every 12 (twelve) hours. - Inhalation  Sent to pharmacy as: SYMBICORT 160-4.5 mcg/actuation HFAA      How is the patient currently taking it? (ex. 1XDay):n/a    Is this a 30 day or 90 day RX:n/a    Preferred Pharmacy with phone number:[facility] #[id number] [address] AT [facility]  Phone: [phone]  Fax: [phone]          Local or Mail Order:local    Ordering Provider:Dr. Dunn    Would the patient rather a call back or a response via [facility]? CB, If needed.     Best Call Back Number:[phone]    Additional Information: Refill request. Please advise.

## 2024-03-27 ENCOUNTER — DOCUMENTATION ONLY (OUTPATIENT)
Dept: ADMINISTRATIVE | Facility: HOSPITAL | Age: 64
End: 2024-03-27
Payer: MEDICARE

## 2024-03-28 ENCOUNTER — TELEPHONE (OUTPATIENT)
Dept: PRIMARY CARE CLINIC | Facility: CLINIC | Age: 64
End: 2024-03-28
Payer: MEDICARE

## 2024-03-28 NOTE — TELEPHONE ENCOUNTER
----- Message from Jose Juan Dunn MD sent at 3/28/2024  7:55 AM CDT -----  Patient had an FIT test, sent to her by someone else.  It is positive, she needs to have a colonoscopy scheduled, I tried to contact her unsuccessfully, please contact her and send a message back to me so I can make a referral.  ----- Message -----  From: Frieda Olivares RN  Sent: 3/27/2024  12:57 PM CDT  To: Jose Juan Dunn MD

## 2024-04-01 ENCOUNTER — TELEPHONE (OUTPATIENT)
Dept: PRIMARY CARE CLINIC | Facility: CLINIC | Age: 64
End: 2024-04-01
Payer: MEDICARE

## 2024-04-04 ENCOUNTER — TELEPHONE (OUTPATIENT)
Dept: PRIMARY CARE CLINIC | Facility: CLINIC | Age: 64
End: 2024-04-04
Payer: MEDICARE

## 2024-04-11 DIAGNOSIS — F51.01 PRIMARY INSOMNIA: Primary | Chronic | ICD-10-CM

## 2024-04-11 RX ORDER — ALPRAZOLAM 0.5 MG/1
TABLET ORAL
Qty: 120 TABLET | Refills: 5 | Status: SHIPPED | OUTPATIENT
Start: 2024-04-11

## 2024-04-11 NOTE — TELEPHONE ENCOUNTER
----- Message from Alyx Garcai sent at 4/11/2024 11:33 AM CDT -----  Regarding: refill  Type:  RX Refill Request    Who Called: pt    RX Name and Strength:ALPRAZolam (XANAX) 0.5 MG tablet     Preferred Pharmacy with phone number:[facility] in [place]    Best Call Back Number:[phone]  Additional Information: stated that she has 1 tablet left. Please advise

## 2024-06-18 ENCOUNTER — TELEPHONE (OUTPATIENT)
Dept: PRIMARY CARE CLINIC | Facility: CLINIC | Age: 64
End: 2024-06-18
Payer: MEDICARE

## 2024-06-18 PROBLEM — L97.319 NON-PRESSURE CHRONIC ULCER OF RIGHT ANKLE WITH UNSPECIFIED SEVERITY: Status: ACTIVE | Noted: 2024-06-18

## 2024-06-18 PROBLEM — K74.60 UNSPECIFIED CIRRHOSIS OF LIVER: Status: ACTIVE | Noted: 2024-06-18

## 2024-06-18 NOTE — TELEPHONE ENCOUNTER
Pre Visit Call    Pt has visit on ....06/19  Did pt confirm appointment? yes  Wellness labs done? N/A

## 2024-06-18 NOTE — PROGRESS NOTES
"Subjective:       Patient ID: Lauren Ring is a 64 y.o. female.    Chief Complaint: Anxiety and Insomnia    Patient presents to the clinic today for chronic condition follow-up.  Doing well, no specific complaints, tolerating all medications, reporting no significant side effects or problems.    Last wellness exam was 12/19/2023.    Chronic conditions being treated include but are not limited to anxiety with depression, primary insomnia, pulmonary emphysema, oxygen dependent, only at night.      Review of Systems   Constitutional: Negative.  Negative for fatigue and fever.   HENT: Negative.  Negative for sore throat and trouble swallowing.    Eyes: Negative.  Negative for redness and visual disturbance.   Respiratory: Negative.  Negative for chest tightness and shortness of breath.    Cardiovascular: Negative.  Negative for chest pain.   Gastrointestinal: Negative.  Negative for abdominal pain, blood in stool and nausea.   Endocrine: Negative.  Negative for cold intolerance, heat intolerance and polyuria.   Genitourinary: Negative.    Musculoskeletal: Negative.  Negative for arthralgias, gait problem and joint swelling.   Integumentary:  Negative for rash. Negative.   Neurological: Negative.  Negative for dizziness, weakness and headaches.   Psychiatric/Behavioral: Negative.  Negative for agitation and dysphoric mood.            Patient Care Team:  Jose Juan Dunn MD as PCP - General (Family Medicine)  Javan Hassan DO as Consulting Physician (Orthopedic Surgery)  Santana Braga MD as Consulting Physician (Pulmonary Disease)  [facility]  Objective:   Visit Vitals  /82   Pulse 93   Resp 18   Ht 5' 6" (1.676 m)   Wt 53.5 kg (118 lb)   LMP  (LMP Unknown)   SpO2 97%   BMI 19.05 kg/m²        Physical Exam  Constitutional:       Appearance: Normal appearance.   HENT:      Head: Normocephalic.      Mouth/Throat:      Mouth: Mucous membranes are moist.      Pharynx: Oropharynx is clear.   Eyes:      " Conjunctiva/sclera: Conjunctivae normal.      Pupils: Pupils are equal, round, and reactive to light.   Cardiovascular:      Rate and Rhythm: Normal rate and regular rhythm.      Heart sounds: Normal heart sounds.   Pulmonary:      Effort: Pulmonary effort is normal.      Breath sounds: Normal breath sounds.   Abdominal:      General: Abdomen is flat.      Palpations: Abdomen is soft.   Musculoskeletal:         General: Normal range of motion.      Cervical back: Neck supple.   Skin:     General: Skin is warm and dry.   Neurological:      General: No focal deficit present.      Mental Status: She is alert and oriented to person, place, and time.   Psychiatric:         Mood and Affect: Mood normal.         Behavior: Behavior normal.         Thought Content: Thought content normal.         Judgment: Judgment normal.           Lab Results   Component Value Date     12/14/2023    K 4.4 12/14/2023     12/14/2023    CO2 28 12/14/2023    BUN 5.1 (L) 12/14/2023    CREATININE 0.76 12/14/2023    CALCIUM 9.4 12/14/2023    ALBUMIN 3.5 12/14/2023    BILITOT 0.8 12/14/2023    ALKPHOS 84 12/14/2023    AST 27 12/14/2023    ALT 15 12/14/2023    EGFRNORACEVR >60 12/14/2023     Lab Results   Component Value Date    WBC 8.12 12/14/2023    RBC 4.69 12/14/2023    HGB 13.1 12/14/2023    HCT 41.1 12/14/2023    MCV 87.6 12/14/2023    RDW 18.6 (H) 12/14/2023     12/14/2023      Lab Results   Component Value Date    CHOL 221 (H) 12/14/2023    TRIG 112 12/14/2023     (H) 12/14/2023    LDL 89.00 12/14/2023    TOTALCHOLEST 2 12/14/2023     Lab Results   Component Value Date    TSH 2.439 12/14/2023     Lab Results   Component Value Date    HGBA1C 4.8 12/14/2023         Assessment:       ICD-10-CM ICD-9-CM   1. Anxiety with depression  F41.8 300.4   2. Pulmonary emphysema, unspecified emphysema type  J43.9 492.8   3. On home O2  Z99.81 V46.2   4. Primary insomnia  F51.01 307.42   5. GERD without esophagitis  K21.9 530.81    6. Positive FIT (fecal immunochemical test)  R19.5 792.1   7. Wellness examination  Z00.00 V70.0   8. Abnormal blood chemistry  R79.9 790.6   9. Dependence on other enabling machines and devices  Z99.89 V46.8       Current Outpatient Medications   Medication Instructions    albuterol (PROAIR HFA) 90 mcg/actuation inhaler 2 puffs, Inhalation, Every 6 hours PRN    albuterol-ipratropium (DUO-NEB) 2.5 mg-0.5 mg/3 mL nebulizer solution 3 mLs, Nebulization, 3 times daily PRN    ALPRAZolam (XANAX) 0.5 MG tablet Take one tablet in the morning, one tablet in the late afternoon, and two tablets each evening before bedtime.    aspirin (ECOTRIN) 81 mg, Oral, Daily    famotidine (PEPCID) 40 mg, Oral, Daily    ferrous sulfate (FEOSOL) Tab tablet 1 tablet, Oral, With breakfast    loratadine (CLARITIN) 10 mg, Oral, Daily    SYMBICORT 160-4.5 mcg/actuation HFAA 2 puffs, Inhalation, Every 12 hours    TRINTELLIX 5 mg, Oral, Daily     Plan:     Problem List Items Addressed This Visit          Psychiatric    Anxiety with depression - Primary (Chronic)    Overview     Prescribed Trintellix 5 mg daily, alprazolam 0.5 mg.  T.i.d., takes two at night for insomnia.    Patient reports stability of moods, and effectiveness of medications, including no agitation, significant somnolence, or significant bouts of anxiety or depression.  Patient is not having any sleep disturbance.  Current treatment plan will continue, with plans to discuss any significant changes in patient's status if necessary if anything changes in the future..    Medication will be continued at current dosage.            Pulmonary    COPD (chronic obstructive pulmonary disease) with emphysema (Chronic)    Overview     Prescribed albuterol, DuoNeb, Symbicort.  She does see pulmonology, Dr. Braga.    Patient sees a specialist for this condition.  This medical condition appears to be stable, patient will continue regular follow-up with the treating specialist.         On  home O2 (Chronic)    Overview     Patient is on home oxygen, especially at night.  We will get her a portable oxygen compressor for her to use.               Oncology    Positive FIT (fecal immunochemical test) (Chronic)    Overview     Positive FIT test, done at home through her insurance.  She does not wish to investigate this any further.            GI    GERD without esophagitis (Chronic)    Overview     Takes Pepcid 40 mg daily.    This medical condition is currently stable on prescription medication, continue current treatment plan.            Other    Primary insomnia (Chronic)    Overview     Prescribed alprazolam 0.5 mg t.i.d., takes two each evening for insomnia.    This medical condition is currently stable on prescription medication, continue current treatment plan.         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  She is also on supplemental oxygen at times at home.  For these, and other reasons there are certain screening tests that will be deferred indefinitely, including colon cancer screening, and breast cancer screening.          Other Visit Diagnoses       Wellness examination        This diagnosis does not apply to this visit, wellness exam with pre visit labs will be scheduled/ordered for future visit.    Relevant Orders    Comprehensive Metabolic Panel    Lipid Panel    TSH    Abnormal blood chemistry        Relevant Orders    CBC Auto Differential    Hemoglobin A1C                    Follow up in about 6 months (around 12/30/2024) for Wellness.

## 2024-06-19 ENCOUNTER — OFFICE VISIT (OUTPATIENT)
Dept: PRIMARY CARE CLINIC | Facility: CLINIC | Age: 64
End: 2024-06-19
Payer: MEDICARE

## 2024-06-19 VITALS
HEART RATE: 93 BPM | HEIGHT: 66 IN | RESPIRATION RATE: 18 BRPM | DIASTOLIC BLOOD PRESSURE: 82 MMHG | SYSTOLIC BLOOD PRESSURE: 114 MMHG | OXYGEN SATURATION: 97 % | WEIGHT: 118 LBS | BODY MASS INDEX: 18.96 KG/M2

## 2024-06-19 DIAGNOSIS — Z99.89 DEPENDENCE ON OTHER ENABLING MACHINES AND DEVICES: Chronic | ICD-10-CM

## 2024-06-19 DIAGNOSIS — R79.9 ABNORMAL BLOOD CHEMISTRY: ICD-10-CM

## 2024-06-19 DIAGNOSIS — F41.8 ANXIETY WITH DEPRESSION: Primary | Chronic | ICD-10-CM

## 2024-06-19 DIAGNOSIS — J43.9 PULMONARY EMPHYSEMA, UNSPECIFIED EMPHYSEMA TYPE: Chronic | ICD-10-CM

## 2024-06-19 DIAGNOSIS — F51.01 PRIMARY INSOMNIA: Chronic | ICD-10-CM

## 2024-06-19 DIAGNOSIS — Z99.81 ON HOME O2: Chronic | ICD-10-CM

## 2024-06-19 DIAGNOSIS — K21.9 GERD WITHOUT ESOPHAGITIS: Chronic | ICD-10-CM

## 2024-06-19 DIAGNOSIS — R19.5 POSITIVE FIT (FECAL IMMUNOCHEMICAL TEST): Chronic | ICD-10-CM

## 2024-06-19 DIAGNOSIS — Z00.00 WELLNESS EXAMINATION: ICD-10-CM

## 2024-06-19 PROCEDURE — 3079F DIAST BP 80-89 MM HG: CPT | Mod: CPTII,,, | Performed by: GENERAL PRACTICE

## 2024-06-19 PROCEDURE — 1159F MED LIST DOCD IN RCRD: CPT | Mod: CPTII,,, | Performed by: GENERAL PRACTICE

## 2024-06-19 PROCEDURE — 1160F RVW MEDS BY RX/DR IN RCRD: CPT | Mod: CPTII,,, | Performed by: GENERAL PRACTICE

## 2024-06-19 PROCEDURE — 99214 OFFICE O/P EST MOD 30 MIN: CPT | Mod: ,,, | Performed by: GENERAL PRACTICE

## 2024-06-19 PROCEDURE — 3074F SYST BP LT 130 MM HG: CPT | Mod: CPTII,,, | Performed by: GENERAL PRACTICE

## 2024-06-19 PROCEDURE — 3008F BODY MASS INDEX DOCD: CPT | Mod: CPTII,,, | Performed by: GENERAL PRACTICE

## 2024-06-19 RX ORDER — LORATADINE 10 MG/1
10 TABLET ORAL DAILY
COMMUNITY

## 2024-09-19 ENCOUNTER — TELEPHONE (OUTPATIENT)
Dept: PRIMARY CARE CLINIC | Facility: CLINIC | Age: 64
End: 2024-09-19
Payer: MEDICARE

## 2024-09-19 DIAGNOSIS — F41.8 ANXIETY WITH DEPRESSION: Chronic | ICD-10-CM

## 2024-09-19 RX ORDER — VORTIOXETINE 5 MG/1
5 TABLET, FILM COATED ORAL DAILY
Qty: 90 TABLET | Refills: 3 | Status: SHIPPED | OUTPATIENT
Start: 2024-09-19 | End: 2025-09-19

## 2024-09-19 NOTE — TELEPHONE ENCOUNTER
----- Message from Sonia Navarro sent at 9/19/2024  8:40 AM CDT -----  .Who Called: [facility]    Refill or New Rx: Refill     RX Name and Strength: vortioxetine (TRINTELLIX) 5 mg Tab    How is the patient currently taking it? (ex. 1XDay): Sig - Route: Take 1 tablet (5 mg total) by mouth once daily. - Oral    Is this a 30 day or 90 day RX: 90    Local or Mail Order: Local    List of preferred pharmacies on file (remove unneeded): [facility] #[id number] - [address] AT [facility]   Phone: [phone]  Fax: [phone]    Ordering Provider: Dr. Dunn    Preferred Method of Contact: Phone Call    Patient's Preferred Phone Number on File: [phone]     Best Call Back Number, if different:    Additional Information:  n/a

## 2024-09-25 DIAGNOSIS — J43.9 PULMONARY EMPHYSEMA, UNSPECIFIED EMPHYSEMA TYPE: Chronic | ICD-10-CM

## 2024-09-25 RX ORDER — ALBUTEROL SULFATE 90 UG/1
2 INHALANT RESPIRATORY (INHALATION) EVERY 6 HOURS PRN
Qty: 18 G | Refills: 11 | Status: SHIPPED | OUTPATIENT
Start: 2024-09-25 | End: 2025-09-25

## 2024-10-21 DIAGNOSIS — F51.01 PRIMARY INSOMNIA: Chronic | ICD-10-CM

## 2024-10-21 DIAGNOSIS — J43.9 PULMONARY EMPHYSEMA, UNSPECIFIED EMPHYSEMA TYPE: Chronic | ICD-10-CM

## 2024-10-21 RX ORDER — ALPRAZOLAM 0.5 MG/1
TABLET ORAL
Qty: 120 TABLET | Refills: 5 | Status: SHIPPED | OUTPATIENT
Start: 2024-10-21

## 2024-10-21 RX ORDER — IPRATROPIUM BROMIDE AND ALBUTEROL SULFATE 2.5; .5 MG/3ML; MG/3ML
3 SOLUTION RESPIRATORY (INHALATION) 3 TIMES DAILY PRN
Qty: 75 ML | Refills: 11 | Status: SHIPPED | OUTPATIENT
Start: 2024-10-21 | End: 2025-10-21

## 2024-10-21 NOTE — TELEPHONE ENCOUNTER
----- Message from Richard sent at 10/21/2024  8:01 AM CDT -----  .Who Called: Lauren Ring    Refill or New Rx:Refill  RX Name and Strength:ALPRAZolam (XANAX) 0.5 MG tablet  How is the patient currently taking it? (ex. 1XDay):Sig: Take one tablet in the morning, one tablet in the late afternoon, and two tablets each evening before bedtime.  Is this a 30 day or 90 day RX:30 day  Local or Mail Order:local  List of preferred pharmacies on file (remove unneeded): [facility] #[id number] [place], [place] - [address] [address] AT [facility] [[id number]]  Ordering Provider:Jose Juan Dunn MD    Refill or New Rx:Refill  RX Name and Strength:albuterol-ipratropium (DUO-NEB) 2.5 mg-0.5 mg/3 mL nebulizer solution  How is the patient currently taking it? (ex. 1XDay): Sig - Route: Take 3 mLs by nebulization 3 (three) times daily as needed for Wheezing. - Nebulization  Is this a 30 day or 90 day RX:  Local or Mail Order:local  List of preferred pharmacies on file (remove unneeded): [facility] #[id number] [place], [place] - [address] AT [facility] [[id number]]  Ordering Provider:Jose Juan Dunn MD    Preferred Method of Contact: Phone Call  Patient's Preferred Phone Number on File: [phone]   Best Call Back Number, if different:  Additional Information:

## 2024-10-28 ENCOUNTER — TELEPHONE (OUTPATIENT)
Dept: PRIMARY CARE CLINIC | Facility: CLINIC | Age: 64
End: 2024-10-28
Payer: MEDICARE

## 2024-11-14 NOTE — PROGRESS NOTES
[facility] - Ortho Neuro  Orthopedics  Progress Note    Patient Name: Lauren Ring  MRN: [id number]  Admission Date: 7/28/2022  Hospital Length of Stay: 2 days  Attending Provider: Javan Hassan DO  Primary Care Provider: WINNIE Luu  Follow-up For: Procedure(s) (LRB):  INCISION AND DRAINAGE, LOWER EXTREMITY (Right)    Post-Operative Day: 5 Day Post-Op  Subjective:     Principal Problem:<principal problem not specified>    Principal Orthopedic Problem: 5 Day Post-Op   Hardware removal with I&d to right lower ext    Interval History: Patient resting comfortably this morning. States her pain is well controlled with medications. Case management working on rehab vs SNF placement. Dressing removed today. Patient and  both believe they are capable of daily wound care if they can have home health provide supplies and help a couple of days per week. Patient is eager for discharge home.     Review of patient's allergies indicates:   Allergen Reactions    Codeine     Tetanus toxoid        Current Facility-Administered Medications   Medication    albuterol inhaler 2 puff    albuterol-ipratropium 2.5 mg-0.5 mg/3 mL nebulizer solution 3 mL    aspirin EC tablet 81 mg    bisacodyL suppository 10 mg    cefazolin (ANCEF) 2 gram in dextrose 5% 50 mL IVPB (premix)    diphenhydrAMINE capsule 25 mg    docusate sodium capsule 100 mg    enoxaparin injection 40 mg    famotidine tablet 20 mg    fluticasone furoate-vilanteroL 100-25 mcg/dose diskus inhaler 1 puff    isosorbide mononitrate 24 hr tablet 30 mg    magnesium hydroxide 400 mg/5 ml suspension 2,400 mg    methocarbamoL tablet 750 mg    morphine injection 4 mg    ondansetron disintegrating tablet 4 mg    ondansetron injection 4 mg    ondansetron injection 4 mg    oxyCODONE immediate release tablet 10 mg    oxyCODONE immediate release tablet 5 mg    senna tablet 8.6 mg    sodium chloride 0.9% flush 10 mL    traZODone tablet 50 mg  "    Objective:     Vital Signs (Most Recent):  Temp: 99.8 °F (37.7 °C) (08/01/22 2002)  Pulse: 98 (08/01/22 2002)  Resp: 18 (08/02/22 0502)  BP: 126/71 (08/01/22 2002)  SpO2: 96 % (08/01/22 2002) Vital Signs (24h Range):  Temp:  [98.8 °F (37.1 °C)-99.8 °F (37.7 °C)] 99.8 °F (37.7 °C)  Pulse:  [] 98  Resp:  [17-18] 18  SpO2:  [95 %-96 %] 96 %  BP: (119-126)/(68-71) 126/71     Weight: 57.2 kg (126 lb 1.7 oz)  Height: 5' 6" (167.6 cm)  Body mass index is 20.35 kg/m².      Intake/Output Summary (Last 24 hours) at 8/2/2022 0737  Last data filed at 8/1/2022 1800  Gross per 24 hour   Intake 240 ml   Output 950 ml   Net -710 ml       Physical Exam:   Musculoskeletal:       Right lower extremity:Dressing removed for exam this morning. Incisions are clean and dry. Wound packed with occlusive dressing this morning. No erythema, drainage, signs of dehiscence; compartments are soft and compressible;Tolerates passive ROM of the knee and ankle; appropriate tenderness to palpation; dorsi/plantar flexes the foot; SILT distally; BCR distally; DP pulse palpable        Diagnostic Findings:   Significant Labs:   Recent Lab Results       07/31/22  0354        Albumin/Globulin Ratio 1.0       Albumin 2.6       Alkaline Phosphatase 56       ALT 11       AST 20       Baso # 0.08       Basophil % 0.9       BILIRUBIN TOTAL 0.3       BUN 4.6       Calcium 8.4       Chloride 108       CO2 25       Creatinine 0.69       eGFR if non African American >60       Eos # 0.28       Eosinophil % 3.0       Globulin, Total 2.7       Glucose 107       Hematocrit 31.4       Hemoglobin 9.8       Immature Grans (Abs) 0.04       Immature Granulocytes 0.4       Lymph # 2.08       LYMPH % 22.6       MCH 28.9       MCHC 31.2       MCV 92.6       Mono # 1.16       Mono % 12.6       MPV 10.5       Neut # 5.6       Neut % 60.5       nRBC 0.0       Platelets 186       Potassium 4.2       PROTEIN TOTAL 5.3       RBC 3.39       RDW 14.3       Sodium 140       WBC " 9.2              Significant Imaging: I have reviewed and interpreted all pertinent imaging results/findings.     Assessment/Plan:     There are no hospital problems to display for this patient.  Patient s/p hardware removal and I&D of draining sinus tract to the right ankle.   Following review of wound and current wound care dressing, I agree that the patient would be able to manage her wound care at home with the help of home health. They have ensured me that they would be able to do this daily as her  is typically home with her.  Remain NWB to the RLE for approx 3 weeks until followup. Daily dry dressing changes per wound care.   She is orthopaedically stable for discharge. I will place orders today. Will need referral to home health. Patient may continue with plan for rehab if this is able to be obtained today, otherwise, she is stable for discharge home with home health.   Follow up with Dr. Hassan in 3 weeks for wound check.   Lovenox for DVT ppx during stay. Aspirin upon discharge.        The above findings, diagnostics, and treatment plan were discussed with Dr. Hassan who is in agreement with the plan of care except as stated in additional documentation.      [facility]   Orthopedic Trauma       Artificial joint

## 2024-12-27 ENCOUNTER — CLINICAL SUPPORT (OUTPATIENT)
Dept: PRIMARY CARE CLINIC | Facility: CLINIC | Age: 64
End: 2024-12-27
Payer: MEDICARE

## 2024-12-27 DIAGNOSIS — Z00.00 WELLNESS EXAMINATION: ICD-10-CM

## 2024-12-27 DIAGNOSIS — R79.9 ABNORMAL BLOOD CHEMISTRY: ICD-10-CM

## 2024-12-27 LAB
ALBUMIN SERPL-MCNC: 3.2 G/DL (ref 3.4–4.8)
ALBUMIN/GLOB SERPL: 0.9 RATIO (ref 1.1–2)
ALP SERPL-CCNC: 100 UNIT/L (ref 40–150)
ALT SERPL-CCNC: 23 UNIT/L (ref 0–55)
ANION GAP SERPL CALC-SCNC: 10 MEQ/L
AST SERPL-CCNC: 42 UNIT/L (ref 5–34)
BASOPHILS # BLD AUTO: 0.15 X10(3)/MCL
BASOPHILS NFR BLD AUTO: 1.2 %
BILIRUB SERPL-MCNC: 0.5 MG/DL
BUN SERPL-MCNC: <3 MG/DL (ref 9.8–20.1)
CALCIUM SERPL-MCNC: 9.5 MG/DL (ref 8.4–10.2)
CHLORIDE SERPL-SCNC: 105 MMOL/L (ref 98–107)
CHOLEST SERPL-MCNC: 173 MG/DL
CHOLEST/HDLC SERPL: 2 {RATIO} (ref 0–5)
CO2 SERPL-SCNC: 24 MMOL/L (ref 23–31)
CREAT SERPL-MCNC: 0.79 MG/DL (ref 0.55–1.02)
CREAT/UREA NIT SERPL: <4
EOSINOPHIL # BLD AUTO: 0.08 X10(3)/MCL (ref 0–0.9)
EOSINOPHIL NFR BLD AUTO: 0.6 %
ERYTHROCYTE [DISTWIDTH] IN BLOOD BY AUTOMATED COUNT: 17.9 % (ref 11.5–17)
EST. AVERAGE GLUCOSE BLD GHB EST-MCNC: 88.2 MG/DL
GFR SERPLBLD CREATININE-BSD FMLA CKD-EPI: >60 ML/MIN/1.73/M2
GLOBULIN SER-MCNC: 3.6 GM/DL (ref 2.4–3.5)
GLUCOSE SERPL-MCNC: 141 MG/DL (ref 82–115)
HBA1C MFR BLD: 4.7 %
HCT VFR BLD AUTO: 47.1 % (ref 37–47)
HDLC SERPL-MCNC: 74 MG/DL (ref 35–60)
HGB BLD-MCNC: 14.7 G/DL (ref 12–16)
IMM GRANULOCYTES # BLD AUTO: 0.04 X10(3)/MCL (ref 0–0.04)
IMM GRANULOCYTES NFR BLD AUTO: 0.3 %
LDLC SERPL CALC-MCNC: 81 MG/DL (ref 50–140)
LYMPHOCYTES # BLD AUTO: 1.92 X10(3)/MCL (ref 0.6–4.6)
LYMPHOCYTES NFR BLD AUTO: 15.4 %
MCH RBC QN AUTO: 25.2 PG (ref 27–31)
MCHC RBC AUTO-ENTMCNC: 31.2 G/DL (ref 33–36)
MCV RBC AUTO: 80.7 FL (ref 80–94)
MONOCYTES # BLD AUTO: 1.22 X10(3)/MCL (ref 0.1–1.3)
MONOCYTES NFR BLD AUTO: 9.8 %
NEUTROPHILS # BLD AUTO: 9.04 X10(3)/MCL (ref 2.1–9.2)
NEUTROPHILS NFR BLD AUTO: 72.7 %
NRBC BLD AUTO-RTO: 0 %
PLATELET # BLD AUTO: 361 X10(3)/MCL (ref 130–400)
PMV BLD AUTO: 10.8 FL (ref 7.4–10.4)
POTASSIUM SERPL-SCNC: 3.8 MMOL/L (ref 3.5–5.1)
PROT SERPL-MCNC: 6.8 GM/DL (ref 5.8–7.6)
RBC # BLD AUTO: 5.84 X10(6)/MCL (ref 4.2–5.4)
SODIUM SERPL-SCNC: 139 MMOL/L (ref 136–145)
TRIGL SERPL-MCNC: 88 MG/DL (ref 37–140)
TSH SERPL-ACNC: 1.13 UIU/ML (ref 0.35–4.94)
VLDLC SERPL CALC-MCNC: 18 MG/DL
WBC # BLD AUTO: 12.45 X10(3)/MCL (ref 4.5–11.5)

## 2024-12-27 PROCEDURE — 80061 LIPID PANEL: CPT | Performed by: GENERAL PRACTICE

## 2024-12-27 PROCEDURE — 36415 COLL VENOUS BLD VENIPUNCTURE: CPT

## 2024-12-27 PROCEDURE — 80053 COMPREHEN METABOLIC PANEL: CPT | Performed by: GENERAL PRACTICE

## 2024-12-27 PROCEDURE — 84443 ASSAY THYROID STIM HORMONE: CPT | Performed by: GENERAL PRACTICE

## 2024-12-27 PROCEDURE — 83036 HEMOGLOBIN GLYCOSYLATED A1C: CPT | Performed by: GENERAL PRACTICE

## 2024-12-27 PROCEDURE — 85025 COMPLETE CBC W/AUTO DIFF WBC: CPT | Performed by: GENERAL PRACTICE

## 2025-02-03 ENCOUNTER — OFFICE VISIT (OUTPATIENT)
Dept: PRIMARY CARE CLINIC | Facility: CLINIC | Age: 65
End: 2025-02-03
Payer: MEDICARE

## 2025-02-03 VITALS
OXYGEN SATURATION: 97 % | HEIGHT: 66 IN | DIASTOLIC BLOOD PRESSURE: 81 MMHG | WEIGHT: 108 LBS | SYSTOLIC BLOOD PRESSURE: 117 MMHG | RESPIRATION RATE: 18 BRPM | BODY MASS INDEX: 17.36 KG/M2 | HEART RATE: 90 BPM

## 2025-02-03 DIAGNOSIS — J43.9 PULMONARY EMPHYSEMA, UNSPECIFIED EMPHYSEMA TYPE: Chronic | ICD-10-CM

## 2025-02-03 DIAGNOSIS — Z99.89 DEPENDENCE ON OTHER ENABLING MACHINES AND DEVICES: Chronic | ICD-10-CM

## 2025-02-03 DIAGNOSIS — Z99.81 ON HOME O2: Chronic | ICD-10-CM

## 2025-02-03 DIAGNOSIS — J44.1 COPD EXACERBATION: ICD-10-CM

## 2025-02-03 DIAGNOSIS — R19.5 POSITIVE FIT (FECAL IMMUNOCHEMICAL TEST): Chronic | ICD-10-CM

## 2025-02-03 DIAGNOSIS — K21.9 GERD WITHOUT ESOPHAGITIS: Chronic | ICD-10-CM

## 2025-02-03 DIAGNOSIS — F41.8 ANXIETY WITH DEPRESSION: Chronic | ICD-10-CM

## 2025-02-03 DIAGNOSIS — N32.81 OAB (OVERACTIVE BLADDER): ICD-10-CM

## 2025-02-03 DIAGNOSIS — F51.01 PRIMARY INSOMNIA: Chronic | ICD-10-CM

## 2025-02-03 DIAGNOSIS — Z00.00 MEDICARE ANNUAL WELLNESS VISIT, SUBSEQUENT: Primary | ICD-10-CM

## 2025-02-03 PROCEDURE — G0439 PPPS, SUBSEQ VISIT: HCPCS | Mod: ,,, | Performed by: GENERAL PRACTICE

## 2025-02-03 PROCEDURE — 1160F RVW MEDS BY RX/DR IN RCRD: CPT | Mod: CPTII,,, | Performed by: GENERAL PRACTICE

## 2025-02-03 PROCEDURE — 3079F DIAST BP 80-89 MM HG: CPT | Mod: CPTII,,, | Performed by: GENERAL PRACTICE

## 2025-02-03 PROCEDURE — 1159F MED LIST DOCD IN RCRD: CPT | Mod: CPTII,,, | Performed by: GENERAL PRACTICE

## 2025-02-03 PROCEDURE — 3074F SYST BP LT 130 MM HG: CPT | Mod: CPTII,,, | Performed by: GENERAL PRACTICE

## 2025-02-03 RX ORDER — OXYBUTYNIN CHLORIDE 5 MG/1
5 TABLET ORAL 3 TIMES DAILY
Qty: 90 TABLET | Refills: 11 | Status: ON HOLD | OUTPATIENT
Start: 2025-02-03 | End: 2026-02-03

## 2025-02-03 RX ORDER — AZITHROMYCIN 250 MG/1
TABLET, FILM COATED ORAL
Qty: 6 TABLET | Refills: 0 | Status: ON HOLD | OUTPATIENT
Start: 2025-02-03

## 2025-02-03 RX ORDER — TIOTROPIUM BROMIDE INHALATION SPRAY 3.12 UG/1
2 SPRAY, METERED RESPIRATORY (INHALATION) DAILY
Status: ON HOLD | COMMUNITY

## 2025-02-03 NOTE — PROGRESS NOTES
Subjective:       Patient ID: Lauren Ring is a 64 y.o. female.    Chief Complaint: Medicare AWV    Patient presents to the clinic today for wellness examination.  Current and past medical history reviewed.  Pertinent family and social history reviewed.    CRC screening:  CRC screening reviewed.  Cervical cancer screening:  If applicable, cervical cancer screening reviewed.  Breast cancer screening:  If applicable, breast cancer screening reviewed.    Vaccinations due:  Vaccination status reviewed, if due and if desired vaccinations provided in given.    She does have a few issues today.  First, she is having a coughing, mucus production without worsening of her dyspnea.  She feels like some of it may be allergies, but she is also concerned that she is becoming ill.  No fever.    Also, she is having issues with being incontinence, both urinary and fecal.  She does take Imodium when her stools are watery.  However, she is having urinary issues, more of an urge incontinence issue, not being able to make it to the bathroom.  She has never received a urologic evaluation.    Opioid Screening: Patient medication list reviewed, patient is not taking prescription opioids. Patient is not using additional opioids than prescribed. Patient is not at low risk of substance abuse based on this opioid use history.    Review of Systems   Constitutional: Negative.  Negative for fatigue and fever.   HENT: Negative.  Negative for sore throat and trouble swallowing.    Eyes: Negative.  Negative for redness and visual disturbance.   Respiratory: Negative.  Negative for chest tightness and shortness of breath.    Cardiovascular: Negative.  Negative for chest pain.   Gastrointestinal: Negative.  Negative for abdominal pain, blood in stool and nausea.   Endocrine: Negative.  Negative for cold intolerance, heat intolerance and polyuria.   Genitourinary: Negative.    Musculoskeletal: Negative.  Negative for arthralgias, gait problem and  "joint swelling.   Integumentary:  Negative for rash. Negative.   Neurological: Negative.  Negative for dizziness, weakness and headaches.   Psychiatric/Behavioral: Negative.  Negative for agitation and dysphoric mood.            Patient Care Team:  Jose Juan Dunn MD as PCP - General (Family Medicine)  Javan Hassan DO as Consulting Physician (Orthopedic Surgery)  Santana Braga MD as Consulting Physician (Pulmonary Disease)  Marta Cortez -  Objective:   Visit Vitals  /81   Pulse 90   Resp 18   Ht 5' 6" (1.676 m)   Wt 49 kg (108 lb)   LMP  (LMP Unknown)   SpO2 97%   BMI 17.43 kg/m²        Physical Exam  Constitutional:       Appearance: Normal appearance.   HENT:      Head: Normocephalic.      Mouth/Throat:      Mouth: Mucous membranes are moist.      Pharynx: Oropharynx is clear.   Eyes:      Conjunctiva/sclera: Conjunctivae normal.      Pupils: Pupils are equal, round, and reactive to light.   Cardiovascular:      Rate and Rhythm: Normal rate and regular rhythm.      Heart sounds: Normal heart sounds.   Pulmonary:      Effort: Pulmonary effort is normal.      Breath sounds: Normal breath sounds.   Abdominal:      General: Abdomen is flat.      Palpations: Abdomen is soft.   Musculoskeletal:         General: Normal range of motion.      Cervical back: Neck supple.   Skin:     General: Skin is warm and dry.   Neurological:      General: No focal deficit present.      Mental Status: She is alert and oriented to person, place, and time.   Psychiatric:         Mood and Affect: Mood normal.         Behavior: Behavior normal.         Thought Content: Thought content normal.         Judgment: Judgment normal.           Lab Results   Component Value Date     12/27/2024    K 3.8 12/27/2024     12/27/2024    CO2 24 12/27/2024    BUN <3.0 (L) 12/27/2024    CREATININE 0.79 12/27/2024    CALCIUM 9.5 12/27/2024    ALBUMIN 3.2 (L) 12/27/2024    BILITOT 0.5 12/27/2024    ALKPHOS 100 12/27/2024    AST 42 " (H) 12/27/2024    ALT 23 12/27/2024    EGFRNORACEVR >60 12/27/2024     Lab Results   Component Value Date    WBC 12.45 (H) 12/27/2024    RBC 5.84 (H) 12/27/2024    HGB 14.7 12/27/2024    HCT 47.1 (H) 12/27/2024    MCV 80.7 12/27/2024    RDW 17.9 (H) 12/27/2024     12/27/2024      Lab Results   Component Value Date    CHOL 173 12/27/2024    TRIG 88 12/27/2024    HDL 74 (H) 12/27/2024    LDL 81.00 12/27/2024    TOTALCHOLEST 2 12/27/2024     Lab Results   Component Value Date    TSH 1.133 12/27/2024     Lab Results   Component Value Date    HGBA1C 4.7 12/27/2024        Assessment:       ICD-10-CM ICD-9-CM   1. Wellness examination  Z00.00 V70.0   2. Anxiety with depression  F41.8 300.4   3. Pulmonary emphysema, unspecified emphysema type  J43.9 492.8   4. On home O2  Z99.81 V46.2   5. Positive FIT (fecal immunochemical test)  R19.5 792.1   6. GERD without esophagitis  K21.9 530.81   7. Dependence on other enabling machines and devices  Z99.89 V46.8   8. Primary insomnia  F51.01 307.42   9. COPD exacerbation  J44.1 491.21   10. OAB (overactive bladder)  N32.81 596.51       Current Outpatient Medications   Medication Instructions    albuterol (PROAIR HFA) 90 mcg/actuation inhaler 2 puffs, Inhalation, Every 6 hours PRN    albuterol-ipratropium (DUO-NEB) 2.5 mg-0.5 mg/3 mL nebulizer solution 3 mLs, Nebulization, 3 times daily PRN    ALPRAZolam (XANAX) 0.5 MG tablet Take one tablet in the morning, one tablet in the late afternoon, and two tablets each evening before bedtime.    aspirin (ECOTRIN) 81 mg, Oral, Daily    azithromycin (Z-GIRISH) 250 MG tablet Take 2 tablets by mouth on day 1; Take 1 tablet by mouth on days 2-5      famotidine (PEPCID) 40 mg, Oral, Daily    ferrous sulfate (FEOSOL) Tab tablet 1 tablet, Oral, With breakfast    loratadine (CLARITIN) 10 mg, Oral, Daily    oxybutynin (DITROPAN) 5 mg, Oral, 3 times daily    SPIRIVA RESPIMAT 2.5 mcg/actuation inhaler 2 puffs, Inhalation, Daily    SYMBICORT 160-4.5  mcg/actuation HFAA 2 puffs, Inhalation, Every 12 hours    TRINTELLIX 5 mg, Oral, Daily     Health risk assessment:  After review of the patient's medical record as it relates to health risk assessment over the next 5-10 years per recommendations of the USPSTF, it was determined that all pertinent recommendations have been appropriately addressed. The patient does not desire any further preventative care measures or screening tests at this time.  We will continue to reassess at each annual visit.    Plan:     Problem List Items Addressed This Visit          Psychiatric    Anxiety with depression (Chronic)    Overview     Prescribed Trintellix 5 mg daily, alprazolam 0.5 mg.  T.i.d., takes two at night for insomnia.    Patient reports stability of moods, and effectiveness of medications, including no agitation, significant somnolence, or significant bouts of anxiety or depression.  Patient is not having any sleep disturbance.  Current treatment plan will continue, with plans to discuss any significant changes in patient's status if necessary if anything changes in the future..    Medication will be continued at current dosage.            Pulmonary    COPD (chronic obstructive pulmonary disease) with emphysema (Chronic)    Overview     Prescribed albuterol, DuoNeb, Symbicort.  She does see pulmonology, Dr. Braga.    Patient sees a specialist for this condition.  This medical condition appears to be stable, patient will continue regular follow-up with the treating specialist.         On home O2 (Chronic)    Overview     Patient is on home oxygen, especially at night.              COPD exacerbation (Chronic)    Overview     Zithromax prescribed, start antibiotic today.         Relevant Medications    azithromycin (Z-GIRISH) 250 MG tablet       Renal/    OAB (overactive bladder)    Overview     Prescribed Ditropan XL 5 mg t.i.d..    Start medication, we will continue to monitor this situation over time.         Relevant  Medications    oxybutynin (DITROPAN) 5 MG Tab       Oncology    Positive FIT (fecal immunochemical test) (Chronic)    Overview     Positive FIT test, done at home through her insurance.  She does not wish to investigate this any further.            GI    GERD without esophagitis (Chronic)    Overview     Takes Pepcid 40 mg daily.    This medical condition is currently stable on prescription medication, continue current treatment plan.            Other    Primary insomnia (Chronic)    Overview     Prescribed alprazolam 0.5 mg t.i.d., takes two each evening for insomnia.    This medical condition is currently stable on prescription medication, continue current treatment plan.         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  She is also on supplemental oxygen at times at home.  For these, and other reasons there are certain screening tests that will be deferred indefinitely, including colon cancer screening, and breast cancer screening.          Other Visit Diagnoses       Wellness examination    -  Primary    Overall health status was reviewed.  Good health habits reinforced.  Annual wellness exams recommended.          Advanced care planning:    Living Will  During this visit, I engaged the patient  in the voluntary advance care planning process.  The patient and I reviewed the role for advance directives and their purpose in directing future healthcare if the patient's unable to speak for him/herself.  At this point in time, the patient does have full decision-making capacity.  We discussed different extreme health states that she could experience, and reviewed what kind of medical care she would want in those situations.  The patient communicated that if she were comatose and had little chance of a meaningful recovery, she would not want machines/life-sustaining treatments used.  In addition to the above preference, other important end-of-life issues for the patient include no other issues. The patient has completed a living will to reflect these preferences.  I spent a total of 5 minutes engaging the patient in this advance care planning discussion.            Follow up in about 6 months (around 8/6/2025) for Extended chronic condition F/up.    This note was created with the assistance of [employer] voice recognition software or phone dictation. There may be transcription errors as a result of using this technology. Effort has been made to assure accuracy of transcription but any obvious errors or omissions should be clarified with the author of the document.

## 2025-02-13 NOTE — PROGRESS NOTES
Patient ID: [id number]     Chief Complaint: Medicare AWV      HPI:     Lauren Ring is a 64 y.o. female here today for a Medicare Wellness. No other complaints today.       -------------------------------------    Anxiety    Cataract    Closed nondisplaced pilon fracture of right tibia with routine healing    COPD (chronic obstructive pulmonary disease)    Depression    Draining cutaneous sinus tract    Emphysema lung    History of lung cancer    Lung cancer    Maxillary sinusitis    Osteomyelitis of right ankle    Primary malignant neoplasm of lung        Past Surgical History:   Procedure Laterality Date    CARPAL TUNNEL RELEASE Bilateral     CATARACT EXTRACTION Bilateral     HYSTERECTOMY      OPEN REDUCTION AND INTERNAL FIXATION (ORIF) OF INJURY OF ANKLE Right     THORACOTOMY      TONSILLECTOMY         Review of patient's allergies indicates:   Allergen Reactions    Codeine Itching    Influenza virus vaccines Other (See Comments)     Adverse Reaction    Tetanus toxoid        No outpatient medications have been marked as taking for the 2/3/25 encounter (Office Visit) with Jose Juan Dunn MD.       Social History     Socioeconomic History    Marital status:    Tobacco Use    Smoking status: Never    Smokeless tobacco: Never   Substance and Sexual Activity    Alcohol use: Yes    Drug use: Never        Family History   Problem Relation Name Age of Onset    Mental illness Mother      Heart disease Father      Cancer Sister      Cancer Brother          Patient Care Team:  Jose Juan Dunn MD as PCP - General (Family Medicine)  Javan Hassan DO as Consulting Physician (Orthopedic Surgery)  Santana Braga MD as Consulting Physician (Pulmonary Disease)  Marta Cortez -     Opioid Screening: Patient medication list reviewed, patient is not taking prescription opioids. Patient is not using additional opioids than prescribed. Patient is at low risk of substance abuse based on this opioid use history.        Subjective:     Review of Systems   Constitutional: Negative.  Negative for malaise/fatigue and weight loss.   HENT: Negative.     Eyes: Negative.    Respiratory: Negative.  Negative for shortness of breath.    Cardiovascular: Negative.  Negative for chest pain.   Gastrointestinal: Negative.    Genitourinary: Negative.    Musculoskeletal: Negative.    Skin: Negative.    Neurological: Negative.  Negative for focal weakness, weakness and headaches.   Endo/Heme/Allergies: Negative.    Psychiatric/Behavioral: Negative.  Negative for depression and memory loss. The patient is not nervous/anxious.          Patient Reported Health Risk Assessment  What [age] 65-69  Are you male or female?: Female  During the past four weeks, how much have you been bothered by emotional problems such as feeling anxious, depressed, irritable, sad, or downhearted and blue?: Not at all  During the past five weeks, has your physical and/or emotional health limited your social activities with family, friends, neighbors, or groups?: Not at all  During the past four weeks, how much bodily pain have you generally had?: No pain  During the past four weeks, was someone available to help if you needed and wanted help?: Yes, as much as I wanted  During the past four weeks, what was the hardest physical activity you could do for at least two minutes?: Light  Can you get to places out of walking distance without help?  (For example, can you travel alone on buses or taxis, or drive your own car?): Yes  Can you go shopping for groceries or clothes without someone's help?: Yes  Can you prepare your own meals?: Yes  Can you do your own housework without help?: Yes  Because of any health problems, do you need the help of another person with your personal care needs such as eating, bathing, dressing, or getting around the house?: No  Can you handle your own money without help?: Yes  During the past four weeks, how would you rate your health in  "general?: Good  How have things been going for you during the past four weeks?: Pretty well  Are you having difficulties driving your car?: No  Do you always fasten your seat belt when you are in a car?: Yes, usually  How often in the past four weeks have you been bothered by falling or dizzy when standing up?: Never  How often in the past four weeks have you been bothered by sexual problems?: Never  How often in the past four weeks have you been bothered by trouble eating well?: Never  How often in the past four weeks have you been bothered by teeth or denture problems?: Never  How often in the past four weeks have you been bothered with problems using the telephone?: Never  How often in the past four weeks have you been bothered by tiredness or fatigue?: Never  Have you fallen two or more times in the past year?: Yes  Are you afraid of falling?: Yes  Are you a smoker?: No  During the past four weeks, how many drinks of wine, beer, or other alcoholic beverages did you have?: One drink or less per week  Do you exercise for about 20 minutes three or more days a week?: No, I usually do not exercise this much  Have you been given any information to help you with hazards in your house that might hurt you?: No  Have you been given any information to help you with keeping track of your medications?: No  How often do you have trouble taking medicines the way you've been told to take them?: I always take them as prescribed  How confident are you that you can control and manage most of your health problems?: Very confident  What is your race? (Check all that apply.):     Objective:     /81   Pulse 90   Resp 18   Ht 5' 6" (1.676 m)   Wt 49 kg (108 lb)   LMP  (LMP Unknown)   SpO2 97%   BMI 17.43 kg/m²     Physical Exam  Constitutional:       Appearance: Normal appearance.   HENT:      Head: Normocephalic.      Mouth/Throat:      Mouth: Mucous membranes are moist.      Pharynx: Oropharynx is clear.   Eyes: "      Conjunctiva/sclera: Conjunctivae normal.      Pupils: Pupils are equal, round, and reactive to light.   Cardiovascular:      Rate and Rhythm: Normal rate and regular rhythm.      Heart sounds: Normal heart sounds.   Pulmonary:      Effort: Pulmonary effort is normal.      Breath sounds: Normal breath sounds.   Abdominal:      General: Abdomen is flat.      Palpations: Abdomen is soft.   Musculoskeletal:         General: Normal range of motion.      Cervical back: Neck supple.   Skin:     General: Skin is warm and dry.   Neurological:      General: No focal deficit present.      Mental Status: She is alert and oriented to person, place, and time.   Psychiatric:         Mood and Affect: Mood normal.         Behavior: Behavior normal.         Thought Content: Thought content normal.         Judgment: Judgment normal.         Lab Results   Component Value Date     12/27/2024    K 3.8 12/27/2024     12/27/2024    CO2 24 12/27/2024    BUN <3.0 (L) 12/27/2024    CREATININE 0.79 12/27/2024    CALCIUM 9.5 12/27/2024    ALBUMIN 3.2 (L) 12/27/2024    BILITOT 0.5 12/27/2024    ALKPHOS 100 12/27/2024    AST 42 (H) 12/27/2024    ALT 23 12/27/2024    EGFRNORACEVR >60 12/27/2024     Lab Results   Component Value Date    WBC 12.45 (H) 12/27/2024    RBC 5.84 (H) 12/27/2024    HGB 14.7 12/27/2024    HCT 47.1 (H) 12/27/2024    MCV 80.7 12/27/2024    RDW 17.9 (H) 12/27/2024     12/27/2024      Lab Results   Component Value Date    CHOL 173 12/27/2024    TRIG 88 12/27/2024    HDL 74 (H) 12/27/2024    LDL 81.00 12/27/2024    TOTALCHOLEST 2 12/27/2024     Lab Results   Component Value Date    TSH 1.133 12/27/2024     Lab Results   Component Value Date    HGBA1C 4.7 12/27/2024             No data to display                  2/3/2025     3:15 PM 12/19/2023    11:00 AM 10/11/2022     9:30 AM 10/4/2022    10:30 AM   Fall Risk Assessment - Outpatient   Mobility Status Ambulatory w/ assistance Ambulatory w/ assistance  Ambulatory Ambulatory w/ assistance   Number of falls 1 2+ 0 0   Identified as fall risk True True False False           Depression Screening  Over the past two weeks, has the patient felt down, depressed, or hopeless?: No  Over the past two weeks, has the patient felt little interest or pleasure in doing things?: No  Functional Ability/Safety Screening  Was the patient's timed Up & Go test unsteady or longer than 30 seconds?: No  Does the patient need help with phone, transportation, shopping, preparing meals, housework, laundry, meds, or managing money?: No  Does the patient's home have rugs in the hallway, lack grab bars in the bathroom, lack handrails on the stairs or have poor lighting?: No  Have you noticed any hearing difficulties?: No  Cognitive Function (Assessed through direct observation with due consideration of information obtained by way of patient reports and/or concerns raised by family, friends, caretakers, or others)    Does the patient repeat questions/statements in the same day?: No  Does the patient have trouble remembering the date, year, and time?: No  Does the patient have difficulty managing finances?: No  Does the patient have a decreased sense of direction?: No  Assessment:       ICD-10-CM ICD-9-CM   1. Medicare annual wellness visit, subsequent  Z00.00 V70.0   2. Anxiety with depression  F41.8 300.4   3. Pulmonary emphysema, unspecified emphysema type  J43.9 492.8   4. On home O2  Z99.81 V46.2   5. Positive FIT (fecal immunochemical test)  R19.5 792.1   6. GERD without esophagitis  K21.9 530.81   7. Dependence on other enabling machines and devices  Z99.89 V46.8   8. Primary insomnia  F51.01 307.42   9. COPD exacerbation  J44.1 491.21   10. OAB (overactive bladder)  N32.81 596.51        Plan:     Medicare Annual Wellness and Personalized Prevention Plan:   Fall Risk + Home Safety + Hearing Impairment + Depression Screen + Cognitive Impairment Screen + Health Risk Assessment all reviewed.        Health Maintenance Topics with due status: Not Due       Topic Last Completion Date    Lipid Panel 12/27/2024      The patient's Health Maintenance was reviewed and the following appears to be due at this time:   There are no preventive care reminders to display for this patient.  Health risk assessment:  After review of the patient's medical record as it relates to health risk assessment over the next 5-10 years per recommendations of the [place]PSTF, it was determined that all pertinent recommendations have been appropriately addressed. The patient does not desire any further preventative care measures or screening tests at this time.  We will continue to reassess at each annual visit.    1. Medicare annual wellness visit, subsequent  Comments:  Overall health status was reviewed.  Good health habits reinforced.  Annual wellness exams recommended.    2. Anxiety with depression  Overview:  Prescribed Trintellix 5 mg daily, alprazolam 0.5 mg.  T.i.d., takes two at night for insomnia.    Patient reports stability of moods, and effectiveness of medications, including no agitation, significant somnolence, or significant bouts of anxiety or depression.  Patient is not having any sleep disturbance.  Current treatment plan will continue, with plans to discuss any significant changes in patient's status if necessary if anything changes in the future..    Medication will be continued at current dosage.      3. Pulmonary emphysema, unspecified emphysema type  Overview:  Prescribed albuterol, DuoNeb, Symbicort.  She does see pulmonology, Dr. Braga.    Patient sees a specialist for this condition.  This medical condition appears to be stable, patient will continue regular follow-up with the treating specialist.      4. On home O2  Overview:  Patient is on home oxygen, especially at night.           5. Positive FIT (fecal immunochemical test)  Overview:  Positive FIT test, done at home through her insurance.  She does not  wish to investigate this any further.      6. GERD without esophagitis  Overview:  Takes Pepcid 40 mg daily.    This medical condition is currently stable on prescription medication, continue current treatment plan.      7. Dependence on other enabling machines and devices  Overview:  Patient does use a walker for ambulation, is frail, weak, has a diagnosis of COPD and liver cirrhosis.  She is also on supplemental oxygen at times at home.  For these, and other reasons there are certain screening tests that will be deferred indefinitely, including colon cancer screening, and breast cancer screening.      8. Primary insomnia  Overview:  Prescribed alprazolam 0.5 mg t.i.d., takes two each evening for insomnia.    This medical condition is currently stable on prescription medication, continue current treatment plan.      9. COPD exacerbation  Overview:  Zithromax prescribed, start antibiotic today.    Orders:  -     azithromycin (Z-GIRISH) 250 MG tablet; Take 2 tablets by mouth on day 1; Take 1 tablet by mouth on days 2-5  Dispense: 6 tablet; Refill: 0    10. OAB (overactive bladder)  Overview:  Prescribed Ditropan XL 5 mg t.i.d..    Start medication, we will continue to monitor this situation over time.    Orders:  -     oxybutynin (DITROPAN) 5 MG Tab; Take 1 tablet (5 mg total) by mouth 3 (three) times daily.  Dispense: 90 tablet; Refill: 11            Advance Care Planning     Date: 02/13/2025    Living Will  During this visit, I engaged the patient  in the voluntary advance care planning process.  The patient and I reviewed the role for advance directives and their purpose in directing future healthcare if the patient's unable to speak for him/herself.  At this point in time, the patient does have full decision-making capacity.  We discussed different extreme health states that she could experience, and reviewed what kind of medical care she would want in those situations.  The patient communicated that if she were  comatose and had little chance of a meaningful recovery, she would not want machines/life-sustaining treatments used. In addition to the above preference, other important end-of-life issues for the patient include no other issues.  Patient does have a living will/advanced care planning, will bring a copy to the clinic so that we can place it in the chart.  Patient may also be given the option to complete our advanced care planning paperwork so that we may enter it into the medical record today.  I spent a total of 5 minutes engaging the patient in this advance care planning discussion.              Current Outpatient Medications   Medication Instructions    albuterol (PROAIR HFA) 90 mcg/actuation inhaler 2 puffs, Inhalation, Every 6 hours PRN    albuterol-ipratropium (DUO-NEB) 2.5 mg-0.5 mg/3 mL nebulizer solution 3 mLs, Nebulization, 3 times daily PRN    ALPRAZolam (XANAX) 0.5 MG tablet Take one tablet in the morning, one tablet in the late afternoon, and two tablets each evening before bedtime.    aspirin (ECOTRIN) 81 mg, Oral, Daily    azithromycin (Z-GIRISH) 250 MG tablet Take 2 tablets by mouth on day 1; Take 1 tablet by mouth on days 2-5      famotidine (PEPCID) 40 mg, Oral, Daily    ferrous sulfate (FEOSOL) Tab tablet 1 tablet, Oral, With breakfast    loratadine (CLARITIN) 10 mg, Oral, Daily    oxybutynin (DITROPAN) 5 mg, Oral, 3 times daily    SPIRIVA RESPIMAT 2.5 mcg/actuation inhaler 2 puffs, Inhalation, Daily    SYMBICORT 160-4.5 mcg/actuation HFAA 2 puffs, Inhalation, Every 12 hours    TRINTELLIX 5 mg, Oral, Daily        Follow up in about 6 months (around 8/6/2025) for Extended chronic condition F/up. In addition to their scheduled follow up, the patient has also been instructed to follow up on as needed basis.     This note was created with the assistance of [employer] voice recognition software or phone dictation. There may be transcription errors as a result of using this technology. Effort has been made to  assure accuracy of transcription but any obvious errors or omissions should be clarified with the author of the document.

## 2025-02-21 ENCOUNTER — HOSPITAL ENCOUNTER (INPATIENT)
Facility: HOSPITAL | Age: 65
LOS: 14 days | Discharge: NURSING, REHAB OR LONG-TERM CARE | DRG: 521 | End: 2025-03-07
Attending: STUDENT IN AN ORGANIZED HEALTH CARE EDUCATION/TRAINING PROGRAM | Admitting: INTERNAL MEDICINE
Payer: MEDICARE

## 2025-02-21 DIAGNOSIS — R11.10 VOMITING: ICD-10-CM

## 2025-02-21 DIAGNOSIS — E87.6 HYPOKALEMIA: Primary | ICD-10-CM

## 2025-02-21 DIAGNOSIS — F51.01 PRIMARY INSOMNIA: Chronic | ICD-10-CM

## 2025-02-21 DIAGNOSIS — R60.9 SWELLING: ICD-10-CM

## 2025-02-21 DIAGNOSIS — S72.002A CLOSED FRACTURE OF NECK OF LEFT FEMUR, INITIAL ENCOUNTER: ICD-10-CM

## 2025-02-21 DIAGNOSIS — E43 SEVERE MALNUTRITION: ICD-10-CM

## 2025-02-21 DIAGNOSIS — S72.009A HIP FRACTURE: ICD-10-CM

## 2025-02-21 LAB
ALBUMIN SERPL-MCNC: 2.9 G/DL (ref 3.4–4.8)
ALBUMIN/GLOB SERPL: 0.8 RATIO (ref 1.1–2)
ALP SERPL-CCNC: 78 UNIT/L (ref 40–150)
ALT SERPL-CCNC: 14 UNIT/L (ref 0–55)
ANION GAP SERPL CALC-SCNC: 11 MEQ/L
APTT PPP: 29.3 SECONDS (ref 23.2–33.7)
AST SERPL-CCNC: 28 UNIT/L (ref 5–34)
BASOPHILS # BLD AUTO: 0.14 X10(3)/MCL
BASOPHILS NFR BLD AUTO: 1.3 %
BILIRUB SERPL-MCNC: 0.4 MG/DL
BUN SERPL-MCNC: 7.2 MG/DL (ref 9.8–20.1)
CALCIUM SERPL-MCNC: 8.9 MG/DL (ref 8.4–10.2)
CHLORIDE SERPL-SCNC: 103 MMOL/L (ref 98–107)
CO2 SERPL-SCNC: 24 MMOL/L (ref 23–31)
CREAT SERPL-MCNC: 0.63 MG/DL (ref 0.55–1.02)
CREAT/UREA NIT SERPL: 11
EOSINOPHIL # BLD AUTO: 0.2 X10(3)/MCL (ref 0–0.9)
EOSINOPHIL NFR BLD AUTO: 1.9 %
ERYTHROCYTE [DISTWIDTH] IN BLOOD BY AUTOMATED COUNT: 18.8 % (ref 11.5–17)
GFR SERPLBLD CREATININE-BSD FMLA CKD-EPI: >60 ML/MIN/1.73/M2
GLOBULIN SER-MCNC: 3.5 GM/DL (ref 2.4–3.5)
GLUCOSE SERPL-MCNC: 92 MG/DL (ref 82–115)
GROUP & RH: NORMAL
HCT VFR BLD AUTO: 37.7 % (ref 37–47)
HGB BLD-MCNC: 11.8 G/DL (ref 12–16)
IMM GRANULOCYTES # BLD AUTO: 0.03 X10(3)/MCL (ref 0–0.04)
IMM GRANULOCYTES NFR BLD AUTO: 0.3 %
INDIRECT COOMBS: NORMAL
INR PPP: 1.1
LYMPHOCYTES # BLD AUTO: 1.35 X10(3)/MCL (ref 0.6–4.6)
LYMPHOCYTES NFR BLD AUTO: 12.7 %
MCH RBC QN AUTO: 25.5 PG (ref 27–31)
MCHC RBC AUTO-ENTMCNC: 31.3 G/DL (ref 33–36)
MCV RBC AUTO: 81.4 FL (ref 80–94)
MONOCYTES # BLD AUTO: 0.92 X10(3)/MCL (ref 0.1–1.3)
MONOCYTES NFR BLD AUTO: 8.6 %
NEUTROPHILS # BLD AUTO: 8.01 X10(3)/MCL (ref 2.1–9.2)
NEUTROPHILS NFR BLD AUTO: 75.2 %
NRBC BLD AUTO-RTO: 0 %
OHS QRS DURATION: 78 MS
OHS QTC CALCULATION: 473 MS
PLATELET # BLD AUTO: 369 X10(3)/MCL (ref 130–400)
PMV BLD AUTO: 9.8 FL (ref 7.4–10.4)
POTASSIUM SERPL-SCNC: 2.6 MMOL/L (ref 3.5–5.1)
PROT SERPL-MCNC: 6.4 GM/DL (ref 5.8–7.6)
PROTHROMBIN TIME: 14.7 SECONDS (ref 12.5–14.5)
RBC # BLD AUTO: 4.63 X10(6)/MCL (ref 4.2–5.4)
SODIUM SERPL-SCNC: 138 MMOL/L (ref 136–145)
SPECIMEN OUTDATE: NORMAL
WBC # BLD AUTO: 10.65 X10(3)/MCL (ref 4.5–11.5)

## 2025-02-21 PROCEDURE — 63600175 PHARM REV CODE 636 W HCPCS: Performed by: NURSE PRACTITIONER

## 2025-02-21 PROCEDURE — 85025 COMPLETE CBC W/AUTO DIFF WBC: CPT | Performed by: STUDENT IN AN ORGANIZED HEALTH CARE EDUCATION/TRAINING PROGRAM

## 2025-02-21 PROCEDURE — 85610 PROTHROMBIN TIME: CPT | Performed by: NURSE PRACTITIONER

## 2025-02-21 PROCEDURE — 96361 HYDRATE IV INFUSION ADD-ON: CPT

## 2025-02-21 PROCEDURE — 11000001 HC ACUTE MED/SURG PRIVATE ROOM

## 2025-02-21 PROCEDURE — 93010 ELECTROCARDIOGRAM REPORT: CPT | Mod: ,,, | Performed by: INTERNAL MEDICINE

## 2025-02-21 PROCEDURE — 25000003 PHARM REV CODE 250: Performed by: INTERNAL MEDICINE

## 2025-02-21 PROCEDURE — 96375 TX/PRO/DX INJ NEW DRUG ADDON: CPT

## 2025-02-21 PROCEDURE — 99223 1ST HOSP IP/OBS HIGH 75: CPT | Mod: 57,,, | Performed by: ORTHOPAEDIC SURGERY

## 2025-02-21 PROCEDURE — 96374 THER/PROPH/DIAG INJ IV PUSH: CPT

## 2025-02-21 PROCEDURE — 93005 ELECTROCARDIOGRAM TRACING: CPT

## 2025-02-21 PROCEDURE — 86901 BLOOD TYPING SEROLOGIC RH(D): CPT | Performed by: STUDENT IN AN ORGANIZED HEALTH CARE EDUCATION/TRAINING PROGRAM

## 2025-02-21 PROCEDURE — 99285 EMERGENCY DEPT VISIT HI MDM: CPT | Mod: 25

## 2025-02-21 PROCEDURE — 63600175 PHARM REV CODE 636 W HCPCS: Performed by: STUDENT IN AN ORGANIZED HEALTH CARE EDUCATION/TRAINING PROGRAM

## 2025-02-21 PROCEDURE — 80053 COMPREHEN METABOLIC PANEL: CPT | Performed by: STUDENT IN AN ORGANIZED HEALTH CARE EDUCATION/TRAINING PROGRAM

## 2025-02-21 PROCEDURE — 25000003 PHARM REV CODE 250: Performed by: NURSE PRACTITIONER

## 2025-02-21 PROCEDURE — 85730 THROMBOPLASTIN TIME PARTIAL: CPT | Performed by: NURSE PRACTITIONER

## 2025-02-21 RX ORDER — OXYBUTYNIN CHLORIDE 5 MG/1
5 TABLET ORAL 3 TIMES DAILY
Status: DISCONTINUED | OUTPATIENT
Start: 2025-02-22 | End: 2025-03-04

## 2025-02-21 RX ORDER — CETIRIZINE HYDROCHLORIDE 10 MG/1
10 TABLET ORAL DAILY
Status: DISCONTINUED | OUTPATIENT
Start: 2025-02-22 | End: 2025-03-07 | Stop reason: HOSPADM

## 2025-02-21 RX ORDER — FLUTICASONE FUROATE AND VILANTEROL 100; 25 UG/1; UG/1
1 POWDER RESPIRATORY (INHALATION) DAILY
Status: DISCONTINUED | OUTPATIENT
Start: 2025-02-22 | End: 2025-03-07 | Stop reason: HOSPADM

## 2025-02-21 RX ORDER — IPRATROPIUM BROMIDE AND ALBUTEROL SULFATE 2.5; .5 MG/3ML; MG/3ML
3 SOLUTION RESPIRATORY (INHALATION) 3 TIMES DAILY PRN
Status: DISCONTINUED | OUTPATIENT
Start: 2025-02-21 | End: 2025-03-07 | Stop reason: HOSPADM

## 2025-02-21 RX ORDER — POTASSIUM CHLORIDE 20 MEQ/1
40 TABLET, EXTENDED RELEASE ORAL ONCE
Status: COMPLETED | OUTPATIENT
Start: 2025-02-21 | End: 2025-02-21

## 2025-02-21 RX ORDER — MORPHINE SULFATE 4 MG/ML
4 INJECTION, SOLUTION INTRAMUSCULAR; INTRAVENOUS
Refills: 0 | Status: COMPLETED | OUTPATIENT
Start: 2025-02-21 | End: 2025-02-21

## 2025-02-21 RX ORDER — ONDANSETRON HYDROCHLORIDE 2 MG/ML
4 INJECTION, SOLUTION INTRAVENOUS EVERY 4 HOURS PRN
Status: DISCONTINUED | OUTPATIENT
Start: 2025-02-21 | End: 2025-03-07 | Stop reason: HOSPADM

## 2025-02-21 RX ORDER — MORPHINE SULFATE 4 MG/ML
2 INJECTION, SOLUTION INTRAMUSCULAR; INTRAVENOUS EVERY 4 HOURS PRN
Status: DISCONTINUED | OUTPATIENT
Start: 2025-02-22 | End: 2025-03-04

## 2025-02-21 RX ORDER — LANOLIN ALCOHOL/MO/W.PET/CERES
1 CREAM (GRAM) TOPICAL
Status: DISCONTINUED | OUTPATIENT
Start: 2025-02-22 | End: 2025-02-28

## 2025-02-21 RX ORDER — POTASSIUM CHLORIDE 7.45 MG/ML
10 INJECTION INTRAVENOUS
Status: COMPLETED | OUTPATIENT
Start: 2025-02-21 | End: 2025-02-21

## 2025-02-21 RX ORDER — ALBUTEROL SULFATE 90 UG/1
2 INHALANT RESPIRATORY (INHALATION) EVERY 6 HOURS PRN
Status: DISCONTINUED | OUTPATIENT
Start: 2025-02-21 | End: 2025-03-07 | Stop reason: HOSPADM

## 2025-02-21 RX ORDER — MORPHINE SULFATE 4 MG/ML
2 INJECTION, SOLUTION INTRAMUSCULAR; INTRAVENOUS EVERY 6 HOURS PRN
Refills: 0 | Status: DISCONTINUED | OUTPATIENT
Start: 2025-02-21 | End: 2025-02-21

## 2025-02-21 RX ORDER — FAMOTIDINE 20 MG/1
40 TABLET, FILM COATED ORAL DAILY
Status: DISCONTINUED | OUTPATIENT
Start: 2025-02-22 | End: 2025-03-04

## 2025-02-21 RX ORDER — ONDANSETRON HYDROCHLORIDE 2 MG/ML
4 INJECTION, SOLUTION INTRAVENOUS
Status: COMPLETED | OUTPATIENT
Start: 2025-02-21 | End: 2025-02-21

## 2025-02-21 RX ORDER — ALPRAZOLAM 0.5 MG/1
0.5 TABLET ORAL 3 TIMES DAILY PRN
Status: DISCONTINUED | OUTPATIENT
Start: 2025-02-21 | End: 2025-03-07 | Stop reason: HOSPADM

## 2025-02-21 RX ADMIN — ONDANSETRON 4 MG: 2 INJECTION INTRAMUSCULAR; INTRAVENOUS at 01:02

## 2025-02-21 RX ADMIN — MORPHINE SULFATE 2 MG: 4 INJECTION INTRAVENOUS at 08:02

## 2025-02-21 RX ADMIN — POTASSIUM CHLORIDE 10 MEQ: 7.46 INJECTION, SOLUTION INTRAVENOUS at 05:02

## 2025-02-21 RX ADMIN — MORPHINE SULFATE 4 MG: 4 INJECTION, SOLUTION INTRAMUSCULAR; INTRAVENOUS at 01:02

## 2025-02-21 RX ADMIN — POTASSIUM CHLORIDE 10 MEQ: 7.46 INJECTION, SOLUTION INTRAVENOUS at 03:02

## 2025-02-21 RX ADMIN — POTASSIUM CHLORIDE 40 MEQ: 1500 TABLET, EXTENDED RELEASE ORAL at 03:02

## 2025-02-21 RX ADMIN — SODIUM CHLORIDE, POTASSIUM CHLORIDE, SODIUM LACTATE AND CALCIUM CHLORIDE 1000 ML: 600; 310; 30; 20 INJECTION, SOLUTION INTRAVENOUS at 01:02

## 2025-02-21 RX ADMIN — POTASSIUM CHLORIDE 10 MEQ: 7.46 INJECTION, SOLUTION INTRAVENOUS at 08:02

## 2025-02-21 RX ADMIN — ALPRAZOLAM 0.5 MG: 0.5 TABLET ORAL at 11:02

## 2025-02-21 RX ADMIN — POTASSIUM CHLORIDE 10 MEQ: 7.46 INJECTION, SOLUTION INTRAVENOUS at 04:02

## 2025-02-21 NOTE — FIRST PROVIDER EVALUATION
"Medical screening examination initiated.  I have conducted a focused provider triage encounter, findings are as follows:    Brief history of present illness:  arrived to ED due to L hip pain after a fall 2 weeks ago. Denies additional injury/trauma.     Vitals:    02/21/25 1249   BP: 115/72   Pulse: 73   Resp: 18   Temp: 97.9 °F (36.6 °C)   SpO2: 96%   Weight: 52.2 kg (115 lb)   Height: 5' 6" (1.676 m)       Pertinent physical exam:  awake, alert, has non-labored breathing, ambulatory     Brief workup plan:  imaging     Preliminary workup initiated; this workup will be continued and followed by the physician or advanced practice provider that is assigned to the patient when roomed.  "

## 2025-02-21 NOTE — ED PROVIDER NOTES
Encounter Date: 2/21/2025    SCRIBE #1 NOTE: I, Francisco Smith, am scribing for, and in the presence of,  Garcia Proctor MD. I have scribed the following portions of the note - Other sections scribed: HPI, ROS, PE.       History     Chief Complaint   Patient presents with    Hip Pain     C/o L hip pain x 2 weeks. States fall x 2 weeks ago and pain since. Denies any new recent trauma injury. GCS 15      Patient is a 64 y.o. female with a PMHx of anxiety, COPD, and lung cancer presenting to the ED with hip pain after a fall 2 weeks ago. Patient reports she fell while getting out of bed and landed on her left side. She states she has had pain in her left hip that has not gotten better despite remaining bed bound the last 2 weeks. Patient is not ambulatory since the fall. Patient takes baby aspirin twice a week.     The history is provided by the patient. No  was used.     Review of patient's allergies indicates:   Allergen Reactions    Codeine Itching    Influenza virus vaccines Other (See Comments)     Adverse Reaction    Tetanus toxoid      Past Medical History:   Diagnosis Date    Anxiety     Cataract 7/26/2023    Closed nondisplaced pilon fracture of right tibia with routine healing     COPD (chronic obstructive pulmonary disease)     Depression     Draining cutaneous sinus tract 8/2/2022    Emphysema lung     History of lung cancer 10/4/2022    Lung cancer 2017    Maxillary sinusitis 12/15/2022    Osteomyelitis of right ankle 7/26/2023    Primary malignant neoplasm of lung 7/26/2023     Past Surgical History:   Procedure Laterality Date    CARPAL TUNNEL RELEASE Bilateral     CATARACT EXTRACTION Bilateral     HYSTERECTOMY      OPEN REDUCTION AND INTERNAL FIXATION (ORIF) OF INJURY OF ANKLE Right     THORACOTOMY      TONSILLECTOMY       Family History   Problem Relation Name Age of Onset    Mental illness Mother      Heart disease Father      Cancer Sister      Cancer Brother       Social  History[1]  Review of Systems   Constitutional:  Negative for chills and fever.   HENT:  Negative for congestion, drooling and sore throat.    Eyes:  Negative for pain and visual disturbance.   Respiratory:  Negative for chest tightness, shortness of breath and wheezing.    Cardiovascular:  Negative for chest pain, palpitations and leg swelling.   Gastrointestinal:  Negative for abdominal pain, nausea and vomiting.   Genitourinary:  Negative for dysuria and hematuria.   Musculoskeletal:  Negative for back pain, neck pain and neck stiffness.        Hip pain   Skin:  Negative for pallor and rash.   Neurological:  Negative for weakness and numbness.   Hematological:  Does not bruise/bleed easily.       Physical Exam     Initial Vitals [02/21/25 1249]   BP Pulse Resp Temp SpO2   115/72 73 18 97.9 °F (36.6 °C) 96 %      MAP       --         Physical Exam    Nursing note and vitals reviewed.  Constitutional: She appears well-developed and well-nourished. She is not diaphoretic. No distress.   Elderly, frail   HENT:   Head: Normocephalic and atraumatic.   Nose: Nose normal. Mouth/Throat: Oropharynx is clear and moist. Mucous membranes are dry.   Eyes: EOM are normal. Pupils are equal, round, and reactive to light.   Neck: Neck supple.   Normal range of motion.  Cardiovascular:  Normal rate, regular rhythm, normal heart sounds and normal pulses.           No murmur heard.  Pulmonary/Chest: Breath sounds normal. No respiratory distress. She has no wheezes. She has no rales.   Abdominal: Abdomen is soft. She exhibits no distension. There is no abdominal tenderness.   Musculoskeletal:      Cervical back: Normal range of motion and neck supple.      Comments: Old bruises to bilateral forearms, pain in left hip with log roll     Neurological: She is alert and oriented to person, place, and time. No cranial nerve deficit or sensory deficit.   Skin: Skin is warm. Capillary refill takes less than 2 seconds. No rash noted.          ED Course   Critical Care    Date/Time: 2/21/2025 6:38 PM    Performed by: Garcia Proctor MD  Authorized by: Garcia Proctor MD  Direct patient critical care time: 35 minutes  Total critical care time (exclusive of procedural time) : 35 minutes  Critical care time was exclusive of separately billable procedures and treating other patients.  Critical care was necessary to treat or prevent imminent or life-threatening deterioration of the following conditions: metabolic crisis.  Critical care was time spent personally by me on the following activities: development of treatment plan with patient or surrogate, discussions with consultants, evaluation of patient's response to treatment, examination of patient, obtaining history from patient or surrogate, ordering and performing treatments and interventions, ordering and review of radiographic studies, ordering and review of laboratory studies, pulse oximetry, re-evaluation of patient's condition and review of old charts.        Labs Reviewed   COMPREHENSIVE METABOLIC PANEL - Abnormal       Result Value    Sodium 138      Potassium 2.6 (*)     Chloride 103      CO2 24      Glucose 92      Blood Urea Nitrogen 7.2 (*)     Creatinine 0.63      Calcium 8.9      Protein Total 6.4      Albumin 2.9 (*)     Globulin 3.5      Albumin/Globulin Ratio 0.8 (*)     Bilirubin Total 0.4      ALP 78      ALT 14      AST 28      eGFR >60      Anion Gap 11.0      BUN/Creatinine Ratio 11     CBC WITH DIFFERENTIAL - Abnormal    WBC 10.65      RBC 4.63      Hgb 11.8 (*)     Hct 37.7      MCV 81.4      MCH 25.5 (*)     MCHC 31.3 (*)     RDW 18.8 (*)     Platelet 369      MPV 9.8      Neut % 75.2      Lymph % 12.7      Mono % 8.6      Eos % 1.9      Basophil % 1.3      Imm Grans % 0.3      Neut # 8.01      Lymph # 1.35      Mono # 0.92      Eos # 0.20      Baso # 0.14      Imm Gran # 0.03      NRBC% 0.0     PROTIME-INR - Abnormal    PT 14.7 (*)     INR 1.1      Narrative:      Protimes are used to monitor anticoagulant agents such as warfarin. PT INR values are based on the current patient normal mean and the ARMANDO value for the specific instrument reagent used.  **Routine theraputic target values for the INR are 2.0-3.0**   APTT - Normal    PTT 29.3     CBC W/ AUTO DIFFERENTIAL    Narrative:     The following orders were created for panel order CBC auto differential.  Procedure                               Abnormality         Status                     ---------                               -----------         ------                     CBC with Differential[[id number]]       Abnormal            Final result                 Please view results for these tests on the individual orders.   TYPE & SCREEN    Group & Rh A POS      Indirect Luis GEL NEG      Specimen Outdate 02/24/2025 23:59          ECG Results              EKG 12-lead (Final result)        Collection Time Result Time QRS Duration OHS QTC Calculation    02/21/25 13:59:19 02/21/25 16:04:48 78 473                     Final result by Interface, Lab In [facility] (02/21/25 16:04:53)                   Narrative:    Test Reason : S72.009A,    Vent. Rate :  71 BPM     Atrial Rate :  71 BPM     P-R Int : 160 ms          QRS Dur :  78 ms      QT Int : 436 ms       P-R-T Axes :  82  75 101 degrees    QTcB Int : 473 ms    Normal sinus rhythm  Nonspecific ST and T wave abnormality  Prolonged QT  Abnormal ECG    Confirmed by Royce Ortiz ([id number]) on 2/21/2025 4:04:46 PM    Referred By: AAAREFERRAL SELF           Confirmed By: Royce Ortiz                                  Imaging Results              X-Ray Chest AP Portable (Final result)  Result time 02/21/25 14:42:03      Final result by Dawit Manriquez MD (02/21/25 14:42:03)                   Impression:      Minimal left basilar atelectatic banding is seen.      Electronically signed by: Dawit Manriquez  Date:    02/21/2025  Time:    14:42               Narrative:     EXAMINATION:  XR CHEST AP PORTABLE    CLINICAL HISTORY:  Fracture of unspecified part of neck of unspecified femur, initial encounter for closed fracture    TECHNIQUE:  Single frontal view of the chest was performed.    COMPARISON:  July 28, 2022    FINDINGS:  The cardiomediastinal silhouette is within normal limits.  Minimal left basilar atelectatic banding is seen.  The remainder lung fields appear clear.  No pleural effusion or pneumothorax are noted.                                       X-Ray Hip 2 or 3 views Left with Pelvis when performed (Final result)  Result time 02/21/25 14:12:57      Final result by Rani Ng MD (02/21/25 14:12:57)                   Narrative:    EXAMINATION:  XR HIP WITH PELVIS WHEN PERFORMED 2 OR 3 VIEWS LEFT    CLINICAL HISTORY:  hip pain;    TECHNIQUE:  AP view of the pelvis and AP and frog leg lateral view of the left hip were performed.    COMPARISON:  11/15/2016    FINDINGS:  BONE: Age-indeterminate displaced left femoral neck fracture with foreshortening.    SOFT TISSUES: Unremarkable.      Electronically signed by: Rani Ng  Date:    02/21/2025  Time:    14:12                                     Medications   morphine injection 2 mg (has no administration in time range)   ondansetron injection 4 mg (has no administration in time range)   potassium chloride 10 mEq in 100 mL IVPB (10 mEq Intravenous New Bag 2/21/25 1735)   morphine injection 4 mg (4 mg Intravenous Given 2/21/25 1350)   ondansetron injection 4 mg (4 mg Intravenous Given 2/21/25 1350)   lactated ringers bolus 1,000 mL (0 mLs Intravenous Stopped 2/21/25 1450)   potassium chloride SA CR tablet 40 mEq (40 mEq Oral Given 2/21/25 1541)     Medical Decision Making  Problems Addressed:  Hip fracture: acute illness or injury    Amount and/or Complexity of Data Reviewed  Labs: ordered.  Radiology: ordered. Decision-making details documented in ED Course.    Risk  Prescription drug management.  Decision  regarding hospitalization.    Differential diagnosis (includes but is not limited to):   Hip fracture, dislocation, neurovascular injury, soft tissue injury, electrolyte abnormalities, dehydration, kidney injury    MDM Narrative  64-year-old female presents for left hip pain over the past 2 weeks after a fall from bed.  She reports ongoing pain but she thought it was just bruised so she did not come in for evaluation.  She finally decided to come in today because she had persistent pain that has not improving at home.  She does have significant tenderness to palpation and with logroll of the left hip.  X-ray confirms a left femoral neck fracture.  Orthopedic surgery consulted and will evaluate the patient with the plan for operative intervention tomorrow, recommends admission to the hospitalist service.  Hospital Medicine consulted will admit.  Labs reviewed, hypokalemia noted, replacement ongoing.    Dispo: Admit    My independent radiology interpretation: as above  Point of care US (independently performed and interpreted):   Decision rules/clinical scoring:     Sepsis Perfusion Assessment:     Amount and/or Complexity of Data Reviewed  Independent historian: none   Summary of history:   External data reviewed: notes from previous ED visits and notes from clinic visits  Summary of data reviewed: Prior records reviewed  Risk and benefits of testing: discussed   Labs: ordered and reviewed  Radiology: ordered and independent interpretation performed (see above or ED course)  ECG/medicine tests: ordered and independent interpretation performed (see above or ED course)  Discussion of management or test interpretation with external provider(s): discussed with hospitalist physician and discussed with ortho consultant   Summary of discussion: as above    Risk  Parenteral controlled substances   Drug therapy requiring intense monitoring for toxicity   Decision regarding hospitalization  Shared decision making     Critical  Care  30-74 minutes     Data Reviewed/Counseling: I have personally reviewed the patient's vital signs, nursing notes, and other relevant tests, information, and imaging. I had a detailed discussion regarding the historical points, exam findings, and any diagnostic results supporting the discharge diagnosis. I personally performed the history, PE, MDM and procedures as documented above and agree with the scribe's documentation.    Portions of this note were dictated using voice recognition software. Although it was reviewed for accuracy, some inherent voice recognition errors may have occurred and may be present in this document.          Scribe Attestation:   Scribe #1: I performed the above scribed service and the documentation accurately describes the services I performed. I attest to the accuracy of the note.    Attending Attestation:           Physician Attestation for Scribe:  Physician Attestation Statement for Scribe #1: I, Garcia Proctor MD, reviewed documentation, as scribed by Fracnisco Smith in my presence, and it is both accurate and complete.             ED Course as of 02/21/25 1839   Fri Feb 21, 2025   1348 Discussed with Orthopedic surgery, admit to hospitalist, plan for surgery tomorrow. [MC]   1410 X-Ray Hip 2 or 3 views Left with Pelvis when performed  Independently visualized/reviewed by me during the ED visit.  - Left femoral neck fracture [MC]   1430 X-Ray Chest AP Portable  Independently visualized/reviewed by me during the ED visit.  - NO acute lobar consolidation [MC]   1433 EKG independently interpreted by me.  EKG: NSR @ 71, no STEMI, Qtc 473 [MC]      ED Course User Index  [MC] Garcia Proctor MD                           Clinical Impression:  Final diagnoses:  [S72.009A] Hip fracture  [E87.6] Hypokalemia (Primary)          ED Disposition Condition    Admit Stable                  [1]   Social History  Tobacco Use    Smoking status: Never    Smokeless tobacco: Never   Substance  Use Topics    Alcohol use: Yes    Drug use: Never        Garcia Proctor MD  02/21/25 2763

## 2025-02-22 ENCOUNTER — ANESTHESIA (OUTPATIENT)
Dept: SURGERY | Facility: HOSPITAL | Age: 65
End: 2025-02-22
Payer: MEDICARE

## 2025-02-22 ENCOUNTER — ANESTHESIA EVENT (OUTPATIENT)
Dept: SURGERY | Facility: HOSPITAL | Age: 65
End: 2025-02-22
Payer: MEDICARE

## 2025-02-22 LAB
ALBUMIN SERPL-MCNC: 2.6 G/DL (ref 3.4–4.8)
ALBUMIN/GLOB SERPL: 0.9 RATIO (ref 1.1–2)
ALP SERPL-CCNC: 79 UNIT/L (ref 40–150)
ALT SERPL-CCNC: 12 UNIT/L (ref 0–55)
ANION GAP SERPL CALC-SCNC: 6 MEQ/L
AST SERPL-CCNC: 22 UNIT/L (ref 5–34)
BASOPHILS # BLD AUTO: 0.11 X10(3)/MCL
BASOPHILS NFR BLD AUTO: 1.4 %
BILIRUB SERPL-MCNC: 0.3 MG/DL
BUN SERPL-MCNC: 6.8 MG/DL (ref 9.8–20.1)
CALCIUM SERPL-MCNC: 8.7 MG/DL (ref 8.4–10.2)
CHLORIDE SERPL-SCNC: 107 MMOL/L (ref 98–107)
CO2 SERPL-SCNC: 27 MMOL/L (ref 23–31)
CREAT SERPL-MCNC: 0.64 MG/DL (ref 0.55–1.02)
CREAT/UREA NIT SERPL: 11
EOSINOPHIL # BLD AUTO: 0.21 X10(3)/MCL (ref 0–0.9)
EOSINOPHIL NFR BLD AUTO: 2.7 %
ERYTHROCYTE [DISTWIDTH] IN BLOOD BY AUTOMATED COUNT: 19 % (ref 11.5–17)
GFR SERPLBLD CREATININE-BSD FMLA CKD-EPI: >60 ML/MIN/1.73/M2
GLOBULIN SER-MCNC: 2.8 GM/DL (ref 2.4–3.5)
GLUCOSE SERPL-MCNC: 109 MG/DL (ref 82–115)
HCT VFR BLD AUTO: 35.9 % (ref 37–47)
HGB BLD-MCNC: 10.9 G/DL (ref 12–16)
IMM GRANULOCYTES # BLD AUTO: 0.03 X10(3)/MCL (ref 0–0.04)
IMM GRANULOCYTES NFR BLD AUTO: 0.4 %
LYMPHOCYTES # BLD AUTO: 1.58 X10(3)/MCL (ref 0.6–4.6)
LYMPHOCYTES NFR BLD AUTO: 20.1 %
MAGNESIUM SERPL-MCNC: 1.8 MG/DL (ref 1.6–2.6)
MCH RBC QN AUTO: 25.5 PG (ref 27–31)
MCHC RBC AUTO-ENTMCNC: 30.4 G/DL (ref 33–36)
MCV RBC AUTO: 84.1 FL (ref 80–94)
MONOCYTES # BLD AUTO: 0.75 X10(3)/MCL (ref 0.1–1.3)
MONOCYTES NFR BLD AUTO: 9.6 %
NEUTROPHILS # BLD AUTO: 5.17 X10(3)/MCL (ref 2.1–9.2)
NEUTROPHILS NFR BLD AUTO: 65.8 %
NRBC BLD AUTO-RTO: 0 %
PLATELET # BLD AUTO: 335 X10(3)/MCL (ref 130–400)
PMV BLD AUTO: 9.6 FL (ref 7.4–10.4)
POTASSIUM SERPL-SCNC: 3.9 MMOL/L (ref 3.5–5.1)
PROT SERPL-MCNC: 5.4 GM/DL (ref 5.8–7.6)
RBC # BLD AUTO: 4.27 X10(6)/MCL (ref 4.2–5.4)
SODIUM SERPL-SCNC: 140 MMOL/L (ref 136–145)
WBC # BLD AUTO: 7.85 X10(3)/MCL (ref 4.5–11.5)

## 2025-02-22 PROCEDURE — 0SRS0JZ REPLACEMENT OF LEFT HIP JOINT, FEMORAL SURFACE WITH SYNTHETIC SUBSTITUTE, OPEN APPROACH: ICD-10-PCS | Performed by: ORTHOPAEDIC SURGERY

## 2025-02-22 PROCEDURE — 11000001 HC ACUTE MED/SURG PRIVATE ROOM

## 2025-02-22 PROCEDURE — 80053 COMPREHEN METABOLIC PANEL: CPT | Performed by: INTERNAL MEDICINE

## 2025-02-22 PROCEDURE — 36000711: Performed by: ORTHOPAEDIC SURGERY

## 2025-02-22 PROCEDURE — 36415 COLL VENOUS BLD VENIPUNCTURE: CPT | Performed by: INTERNAL MEDICINE

## 2025-02-22 PROCEDURE — C1713 ANCHOR/SCREW BN/BN,TIS/BN: HCPCS | Performed by: ORTHOPAEDIC SURGERY

## 2025-02-22 PROCEDURE — D9220A PRA ANESTHESIA: Mod: CRNA,,, | Performed by: NURSE ANESTHETIST, CERTIFIED REGISTERED

## 2025-02-22 PROCEDURE — 63600175 PHARM REV CODE 636 W HCPCS: Performed by: NURSE ANESTHETIST, CERTIFIED REGISTERED

## 2025-02-22 PROCEDURE — 85025 COMPLETE CBC W/AUTO DIFF WBC: CPT | Performed by: INTERNAL MEDICINE

## 2025-02-22 PROCEDURE — 71000033 HC RECOVERY, INTIAL HOUR: Performed by: ORTHOPAEDIC SURGERY

## 2025-02-22 PROCEDURE — 25000003 PHARM REV CODE 250: Performed by: INTERNAL MEDICINE

## 2025-02-22 PROCEDURE — 63600175 PHARM REV CODE 636 W HCPCS: Performed by: ORTHOPAEDIC SURGERY

## 2025-02-22 PROCEDURE — 25000003 PHARM REV CODE 250: Performed by: NURSE ANESTHETIST, CERTIFIED REGISTERED

## 2025-02-22 PROCEDURE — 63600175 PHARM REV CODE 636 W HCPCS: Performed by: NURSE PRACTITIONER

## 2025-02-22 PROCEDURE — 37000009 HC ANESTHESIA EA ADD 15 MINS: Performed by: ORTHOPAEDIC SURGERY

## 2025-02-22 PROCEDURE — 83735 ASSAY OF MAGNESIUM: CPT | Performed by: INTERNAL MEDICINE

## 2025-02-22 PROCEDURE — 37000008 HC ANESTHESIA 1ST 15 MINUTES: Performed by: ORTHOPAEDIC SURGERY

## 2025-02-22 PROCEDURE — 27236 TREAT THIGH FRACTURE: CPT | Mod: AS,LT,, | Performed by: NURSE PRACTITIONER

## 2025-02-22 PROCEDURE — 27236 TREAT THIGH FRACTURE: CPT | Mod: LT,,, | Performed by: ORTHOPAEDIC SURGERY

## 2025-02-22 PROCEDURE — 36000710: Performed by: ORTHOPAEDIC SURGERY

## 2025-02-22 PROCEDURE — 27201423 OPTIME MED/SURG SUP & DEVICES STERILE SUPPLY: Performed by: ORTHOPAEDIC SURGERY

## 2025-02-22 PROCEDURE — D9220A PRA ANESTHESIA: Mod: ANES,,, | Performed by: ANESTHESIOLOGY

## 2025-02-22 PROCEDURE — 63600175 PHARM REV CODE 636 W HCPCS: Mod: JZ,TB | Performed by: ORTHOPAEDIC SURGERY

## 2025-02-22 PROCEDURE — C1776 JOINT DEVICE (IMPLANTABLE): HCPCS | Performed by: ORTHOPAEDIC SURGERY

## 2025-02-22 PROCEDURE — 25000242 PHARM REV CODE 250 ALT 637 W/ HCPCS: Performed by: INTERNAL MEDICINE

## 2025-02-22 DEVICE — HIP STEM - HIGH OFFSET
Type: IMPLANTABLE DEVICE | Site: HIP | Status: FUNCTIONAL
Brand: INSIGNIA

## 2025-02-22 DEVICE — BIPOLAR COMPONENT
Type: IMPLANTABLE DEVICE | Site: HIP | Status: FUNCTIONAL
Brand: UHR

## 2025-02-22 DEVICE — LFIT V40 FEMORAL HEAD
Type: IMPLANTABLE DEVICE | Site: HIP | Status: FUNCTIONAL
Brand: V40 HEAD

## 2025-02-22 RX ORDER — MIDAZOLAM HYDROCHLORIDE 1 MG/ML
INJECTION INTRAMUSCULAR; INTRAVENOUS
Status: DISCONTINUED | OUTPATIENT
Start: 2025-02-22 | End: 2025-02-22

## 2025-02-22 RX ORDER — METHOCARBAMOL 500 MG/1
500 TABLET, FILM COATED ORAL EVERY 8 HOURS PRN
Status: CANCELLED | OUTPATIENT
Start: 2025-02-22

## 2025-02-22 RX ORDER — CEFAZOLIN SODIUM 2 G/50ML
2 SOLUTION INTRAVENOUS ONCE
Status: COMPLETED | OUTPATIENT
Start: 2025-02-22 | End: 2025-02-22

## 2025-02-22 RX ORDER — PROPOFOL 10 MG/ML
VIAL (ML) INTRAVENOUS
Status: DISCONTINUED | OUTPATIENT
Start: 2025-02-22 | End: 2025-02-22

## 2025-02-22 RX ORDER — GLUCAGON 1 MG
1 KIT INJECTION
Status: CANCELLED | OUTPATIENT
Start: 2025-02-22

## 2025-02-22 RX ORDER — MIDAZOLAM HYDROCHLORIDE 2 MG/2ML
2 INJECTION, SOLUTION INTRAMUSCULAR; INTRAVENOUS ONCE AS NEEDED
Status: CANCELLED | OUTPATIENT
Start: 2025-02-22 | End: 2036-07-21

## 2025-02-22 RX ORDER — ALUMINUM HYDROXIDE, MAGNESIUM HYDROXIDE, AND SIMETHICONE 1200; 120; 1200 MG/30ML; MG/30ML; MG/30ML
30 SUSPENSION ORAL EVERY 6 HOURS PRN
Status: CANCELLED | OUTPATIENT
Start: 2025-02-22

## 2025-02-22 RX ORDER — ONDANSETRON HYDROCHLORIDE 2 MG/ML
4 INJECTION, SOLUTION INTRAVENOUS EVERY 6 HOURS PRN
Status: CANCELLED | OUTPATIENT
Start: 2025-02-22

## 2025-02-22 RX ORDER — ROPIVACAINE HYDROCHLORIDE 5 MG/ML
INJECTION, SOLUTION EPIDURAL; INFILTRATION; PERINEURAL
Status: DISCONTINUED | OUTPATIENT
Start: 2025-02-22 | End: 2025-02-22 | Stop reason: HOSPADM

## 2025-02-22 RX ORDER — ACETAMINOPHEN 500 MG
500 TABLET ORAL
Status: CANCELLED | OUTPATIENT
Start: 2025-02-22 | End: 2025-03-08

## 2025-02-22 RX ORDER — MUPIROCIN 20 MG/G
OINTMENT TOPICAL 2 TIMES DAILY
Status: CANCELLED | OUTPATIENT
Start: 2025-02-22 | End: 2025-02-25

## 2025-02-22 RX ORDER — ACETAMINOPHEN 10 MG/ML
INJECTION, SOLUTION INTRAVENOUS
Status: DISCONTINUED | OUTPATIENT
Start: 2025-02-22 | End: 2025-02-22

## 2025-02-22 RX ORDER — ONDANSETRON HYDROCHLORIDE 2 MG/ML
4 INJECTION, SOLUTION INTRAVENOUS DAILY PRN
Status: CANCELLED | OUTPATIENT
Start: 2025-02-22

## 2025-02-22 RX ORDER — ENOXAPARIN SODIUM 100 MG/ML
40 INJECTION SUBCUTANEOUS EVERY 24 HOURS
Status: CANCELLED | OUTPATIENT
Start: 2025-02-23

## 2025-02-22 RX ORDER — TRAMADOL HYDROCHLORIDE 50 MG/1
50 TABLET ORAL EVERY 4 HOURS PRN
Status: CANCELLED | OUTPATIENT
Start: 2025-02-22

## 2025-02-22 RX ORDER — DEXAMETHASONE SODIUM PHOSPHATE 4 MG/ML
INJECTION, SOLUTION INTRA-ARTICULAR; INTRALESIONAL; INTRAMUSCULAR; INTRAVENOUS; SOFT TISSUE
Status: DISCONTINUED | OUTPATIENT
Start: 2025-02-22 | End: 2025-02-22

## 2025-02-22 RX ORDER — METOCLOPRAMIDE HYDROCHLORIDE 5 MG/ML
10 INJECTION INTRAMUSCULAR; INTRAVENOUS EVERY 6 HOURS
Status: CANCELLED | OUTPATIENT
Start: 2025-02-22 | End: 2025-02-23

## 2025-02-22 RX ORDER — AMOXICILLIN 250 MG
2 CAPSULE ORAL NIGHTLY
Status: CANCELLED | OUTPATIENT
Start: 2025-02-22

## 2025-02-22 RX ORDER — SODIUM CHLORIDE 9 MG/ML
INJECTION, SOLUTION INTRAVENOUS CONTINUOUS
Status: CANCELLED | OUTPATIENT
Start: 2025-02-22

## 2025-02-22 RX ORDER — SODIUM CHLORIDE, SODIUM GLUCONATE, SODIUM ACETATE, POTASSIUM CHLORIDE AND MAGNESIUM CHLORIDE 30; 37; 368; 526; 502 MG/100ML; MG/100ML; MG/100ML; MG/100ML; MG/100ML
INJECTION, SOLUTION INTRAVENOUS CONTINUOUS
Status: CANCELLED | OUTPATIENT
Start: 2025-02-22 | End: 2025-03-24

## 2025-02-22 RX ORDER — CEFAZOLIN 2 G/1
2 INJECTION, POWDER, FOR SOLUTION INTRAMUSCULAR; INTRAVENOUS
Status: CANCELLED | OUTPATIENT
Start: 2025-02-22 | End: 2025-02-23

## 2025-02-22 RX ORDER — HYDROMORPHONE HYDROCHLORIDE 2 MG/ML
0.2 INJECTION, SOLUTION INTRAMUSCULAR; INTRAVENOUS; SUBCUTANEOUS EVERY 5 MIN PRN
Refills: 0 | Status: CANCELLED | OUTPATIENT
Start: 2025-02-22

## 2025-02-22 RX ORDER — ROCURONIUM BROMIDE 10 MG/ML
INJECTION, SOLUTION INTRAVENOUS
Status: DISCONTINUED | OUTPATIENT
Start: 2025-02-22 | End: 2025-02-22

## 2025-02-22 RX ORDER — DIPHENHYDRAMINE HYDROCHLORIDE 50 MG/ML
25 INJECTION, SOLUTION INTRAMUSCULAR; INTRAVENOUS EVERY 6 HOURS PRN
Status: CANCELLED | OUTPATIENT
Start: 2025-02-22

## 2025-02-22 RX ORDER — MORPHINE SULFATE 10 MG/ML
INJECTION INTRAMUSCULAR; INTRAVENOUS; SUBCUTANEOUS
Status: DISCONTINUED | OUTPATIENT
Start: 2025-02-22 | End: 2025-02-22 | Stop reason: HOSPADM

## 2025-02-22 RX ORDER — EPINEPHRINE 1 MG/ML
INJECTION, SOLUTION, CONCENTRATE INTRAVENOUS
Status: DISCONTINUED | OUTPATIENT
Start: 2025-02-22 | End: 2025-02-22 | Stop reason: HOSPADM

## 2025-02-22 RX ORDER — KETOROLAC TROMETHAMINE 30 MG/ML
15 INJECTION, SOLUTION INTRAMUSCULAR; INTRAVENOUS EVERY 6 HOURS
Status: CANCELLED | OUTPATIENT
Start: 2025-02-22 | End: 2025-02-23

## 2025-02-22 RX ORDER — ONDANSETRON HYDROCHLORIDE 2 MG/ML
INJECTION, SOLUTION INTRAVENOUS
Status: DISCONTINUED | OUTPATIENT
Start: 2025-02-22 | End: 2025-02-22

## 2025-02-22 RX ORDER — FAMOTIDINE 20 MG/1
20 TABLET, FILM COATED ORAL 2 TIMES DAILY
Status: CANCELLED | OUTPATIENT
Start: 2025-02-22

## 2025-02-22 RX ORDER — ONDANSETRON 4 MG/1
4 TABLET, ORALLY DISINTEGRATING ORAL ONCE
Status: CANCELLED | OUTPATIENT
Start: 2025-02-22 | End: 2025-02-22

## 2025-02-22 RX ORDER — POLYETHYLENE GLYCOL 3350 17 G/17G
17 POWDER, FOR SOLUTION ORAL 2 TIMES DAILY
Status: CANCELLED | OUTPATIENT
Start: 2025-02-22

## 2025-02-22 RX ORDER — LIDOCAINE HYDROCHLORIDE 10 MG/ML
1 INJECTION, SOLUTION EPIDURAL; INFILTRATION; INTRACAUDAL; PERINEURAL ONCE
Status: CANCELLED | OUTPATIENT
Start: 2025-02-22 | End: 2025-02-22

## 2025-02-22 RX ORDER — METOCLOPRAMIDE HYDROCHLORIDE 5 MG/ML
10 INJECTION INTRAMUSCULAR; INTRAVENOUS EVERY 10 MIN PRN
Status: CANCELLED | OUTPATIENT
Start: 2025-02-22

## 2025-02-22 RX ORDER — LIDOCAINE HYDROCHLORIDE 20 MG/ML
INJECTION, SOLUTION EPIDURAL; INFILTRATION; INTRACAUDAL; PERINEURAL
Status: DISCONTINUED | OUTPATIENT
Start: 2025-02-22 | End: 2025-02-22

## 2025-02-22 RX ORDER — BISACODYL 10 MG/1
20 SUPPOSITORY RECTAL DAILY
Status: CANCELLED | OUTPATIENT
Start: 2025-02-25 | End: 2025-02-26

## 2025-02-22 RX ORDER — FENTANYL CITRATE 50 UG/ML
INJECTION, SOLUTION INTRAMUSCULAR; INTRAVENOUS
Status: DISCONTINUED | OUTPATIENT
Start: 2025-02-22 | End: 2025-02-22

## 2025-02-22 RX ORDER — PHENYLEPHRINE HYDROCHLORIDE 10 MG/ML
INJECTION INTRAVENOUS
Status: DISCONTINUED | OUTPATIENT
Start: 2025-02-22 | End: 2025-02-22

## 2025-02-22 RX ORDER — MORPHINE SULFATE 4 MG/ML
2 INJECTION, SOLUTION INTRAMUSCULAR; INTRAVENOUS
Refills: 0 | Status: CANCELLED | OUTPATIENT
Start: 2025-02-22 | End: 2025-02-23

## 2025-02-22 RX ORDER — TALC
6 POWDER (GRAM) TOPICAL NIGHTLY PRN
Status: CANCELLED | OUTPATIENT
Start: 2025-02-22

## 2025-02-22 RX ORDER — KETOROLAC TROMETHAMINE 30 MG/ML
INJECTION, SOLUTION INTRAMUSCULAR; INTRAVENOUS
Status: DISCONTINUED | OUTPATIENT
Start: 2025-02-22 | End: 2025-02-22 | Stop reason: HOSPADM

## 2025-02-22 RX ADMIN — SUGAMMADEX 200 MG: 100 INJECTION, SOLUTION INTRAVENOUS at 05:02

## 2025-02-22 RX ADMIN — PROPOFOL 140 MG: 10 INJECTION, EMULSION INTRAVENOUS at 03:02

## 2025-02-22 RX ADMIN — MORPHINE SULFATE 2 MG: 4 INJECTION, SOLUTION INTRAMUSCULAR; INTRAVENOUS at 08:02

## 2025-02-22 RX ADMIN — ROCURONIUM BROMIDE 50 MG: 10 SOLUTION INTRAVENOUS at 03:02

## 2025-02-22 RX ADMIN — MORPHINE SULFATE 2 MG: 4 INJECTION, SOLUTION INTRAMUSCULAR; INTRAVENOUS at 05:02

## 2025-02-22 RX ADMIN — MORPHINE SULFATE 2 MG: 4 INJECTION, SOLUTION INTRAMUSCULAR; INTRAVENOUS at 12:02

## 2025-02-22 RX ADMIN — FENTANYL CITRATE 100 MCG: 50 INJECTION, SOLUTION INTRAMUSCULAR; INTRAVENOUS at 03:02

## 2025-02-22 RX ADMIN — ALPRAZOLAM 0.5 MG: 0.5 TABLET ORAL at 08:02

## 2025-02-22 RX ADMIN — SODIUM CHLORIDE, SODIUM GLUCONATE, SODIUM ACETATE, POTASSIUM CHLORIDE AND MAGNESIUM CHLORIDE: 526; 502; 368; 37; 30 INJECTION, SOLUTION INTRAVENOUS at 03:02

## 2025-02-22 RX ADMIN — FAMOTIDINE 40 MG: 20 TABLET, FILM COATED ORAL at 08:02

## 2025-02-22 RX ADMIN — MORPHINE SULFATE 2 MG: 4 INJECTION, SOLUTION INTRAMUSCULAR; INTRAVENOUS at 07:02

## 2025-02-22 RX ADMIN — ACETAMINOPHEN 1000 MG: 10 INJECTION, SOLUTION INTRAVENOUS at 04:02

## 2025-02-22 RX ADMIN — ALPRAZOLAM 0.5 MG: 0.5 TABLET ORAL at 01:02

## 2025-02-22 RX ADMIN — PHENYLEPHRINE HYDROCHLORIDE 200 MCG: 10 INJECTION INTRAVENOUS at 03:02

## 2025-02-22 RX ADMIN — CETIRIZINE HYDROCHLORIDE 10 MG: 10 TABLET, FILM COATED ORAL at 08:02

## 2025-02-22 RX ADMIN — ROCURONIUM BROMIDE 10 MG: 10 SOLUTION INTRAVENOUS at 04:02

## 2025-02-22 RX ADMIN — CEFAZOLIN SODIUM 2 G: 2 SOLUTION INTRAVENOUS at 04:02

## 2025-02-22 RX ADMIN — OXYBUTYNIN CHLORIDE 5 MG: 5 TABLET ORAL at 08:02

## 2025-02-22 RX ADMIN — DEXAMETHASONE SODIUM PHOSPHATE 8 MG: 4 INJECTION, SOLUTION INTRA-ARTICULAR; INTRALESIONAL; INTRAMUSCULAR; INTRAVENOUS; SOFT TISSUE at 04:02

## 2025-02-22 RX ADMIN — ONDANSETRON 4 MG: 2 INJECTION INTRAMUSCULAR; INTRAVENOUS at 04:02

## 2025-02-22 RX ADMIN — OXYBUTYNIN CHLORIDE 5 MG: 5 TABLET ORAL at 02:02

## 2025-02-22 RX ADMIN — LIDOCAINE HYDROCHLORIDE 3 MG: 20 INJECTION, SOLUTION INTRAVENOUS at 03:02

## 2025-02-22 RX ADMIN — FLUTICASONE FUROATE AND VILANTEROL TRIFENATATE 1 PUFF: 100; 25 POWDER RESPIRATORY (INHALATION) at 08:02

## 2025-02-22 RX ADMIN — MIDAZOLAM HYDROCHLORIDE 2 MG: 1 INJECTION, SOLUTION INTRAMUSCULAR; INTRAVENOUS at 03:02

## 2025-02-22 NOTE — ANESTHESIA PREPROCEDURE EVALUATION
02/22/2025  Lauren Ring is a 64 y.o., female.Chief Complaint:        Chief Complaint   Patient presents with    Hip Pain       C/o L hip pain x 2 weeks. States fall x 2 weeks ago and pain since. Denies any new recent trauma injury. GCS 15          HPI:  She is a pleasant 64-year-old female who fell a proximally 2 weeks ago after she rolled out of her bed.  She has been nursing the injury since then.  She is mainly stayed in the bed since it happened.  She complains of left leg pain.  It is worse with motion.  It is better at rest.  She denies any new numbness or tingling.  Diagnosis:   Hip fracture [S72.009A]   Pre-op diagnosis: Hip fracture [S72.009A]   Location: [facility] OR 09 / [facility] OR     Case: [id number] Date/Time: 02/22/25 1520   Procedure:   HEMIARTHROPLASTY, HIP, POSTERIOR APPROACH (Left)   Anesthesia type: General     PMHx:Problem List  Current as of 02/22/25 1358  Anxiety with depression COPD (chronic obstructive pulmonary disease) with emphysema   COPD exacerbation Dependence on other enabling machines and devices   GERD without esophagitis History of lung cancer   Non-pressure chronic ulcer of right ankle with unspecified severity OAB (overactive bladder)   On home O2 Positive FIT (fecal immunochemical test)   Primary insomnia Unspecified cirrhosis of liver     No specialty history recorded    Other Medical History   Anxiety COPD (chronic obstructive pulmonary disease)   Emphysema lung Depression   Closed nondisplaced pilon fracture of right tibia with routine healing Lung cancer   Maxillary sinusitis History of lung cancer   Primary malignant neoplasm of lung Cataract   Draining cutaneous sinus tract Osteomyelitis of right ankle           PSHx:  Surgical History:  OPEN REDUCTION AND INTERNAL FIXATION (ORIF) OF INJURY OF ANKLE THORACOTOMY   CATARACT EXTRACTION TONSILLECTOMY   HYSTERECTOMY CARPAL TUNNEL  "RELEASE         Vital signs:    Most Recent Value 2/22/2025  1126 2/22/2025  0847 2/22/2025  0800    Height: 5' 6" (167.6 cm)  as of 2/21/2025 -- -- --   Last Wt: 52.2 kg (115 lb 1.3 oz)  as of 2/21/2025 -- -- --   Body Mass Index: 18.57 kg/m²  5' 6" (167.6 cm)  as of 2/21/2025  52.2 kg (115 lb 1.3 oz)  as of 2/21/2025      Pulse: 88  as of 2/22/2025 88 72 88   BP: 100/65  as of 2/22/2025 100/65 -- 104/67   Temp: 36.9 °C (98.5 °F)  as of 2/22/2025 36.9 °C (98.5 °F) -- 37.1 °C (98.7 °F)   SpO2: 99%  as of 2/22/2025 99 % 93 % Abnormal  94 % Abnormal    Dosing Weight: None            Lab Data:      EKG:          Pre-op Assessment    I have reviewed the Patient Summary Reports.     I have reviewed the Nursing Notes. I have reviewed the NPO Status.   I have reviewed the Medications.     Review of Systems  Anesthesia Hx:  No problems with previous Anesthesia                Social:  Non-Smoker       Hematology/Oncology:  Hematology Normal   Oncology Normal                                   EENT/Dental:  EENT/Dental Normal           Cardiovascular:  Cardiovascular Normal Exercise tolerance: good                     Functional Capacity good / => 4 METS                         Pulmonary:   COPD Asthma       Asthma:   Chronic Obstructive Pulmonary Disease (COPD):                      Renal/:  Renal/ Normal                 Hepatic/GI:     GERD Liver Disease,        Gerd       Liver Disease        Musculoskeletal:  Musculoskeletal Normal                Neurological:  Neurology Normal                                      Endocrine:  Endocrine Normal            Dermatological:  Skin Normal    Psych:  Psychiatric History                  Physical Exam  General: Alert, Oriented, Well nourished and Cooperative    Airway:  Mallampati: II   Mouth Opening: Normal  TM Distance: Normal  Tongue: Normal  Neck ROM: Normal ROM    Dental:  Intact    Chest/Lungs:  Clear to auscultation, Normal Respiratory Rate    Heart:  Rate: " Normal  Rhythm: Regular Rhythm        Anesthesia Plan  Type of Anesthesia, risks & benefits discussed:    Anesthesia Type: Gen ETT  Intra-op Monitoring Plan: Standard ASA Monitors  Post Op Pain Control Plan: IV/PO Opioids PRN  Induction:  IV and Inhalation  Airway Plan: Direct  Informed Consent: Informed consent signed with the Patient and all parties understand the risks and agree with anesthesia plan.  All questions answered. Patient consented to blood products? Yes  ASA Score: 3  Day of Surgery Review of History & Physical: H&P Update referred to the surgeon/provider.    Ready For Surgery From Anesthesia Perspective.     .

## 2025-02-22 NOTE — TRANSFER OF CARE
"Anesthesia Transfer of Care Note    Patient: Lauren Ring    Procedure(s) Performed: Procedure(s) (LRB):  HEMIARTHROPLASTY, HIP, POSTERIOR APPROACH (Left)    Patient location: PACU    Anesthesia Type: general    Transport from OR: Transported from OR on room air with adequate spontaneous ventilation    Post pain: adequate analgesia    Post assessment: no apparent anesthetic complications    Post vital signs: stable    Level of consciousness: sedated    Nausea/Vomiting: no nausea/vomiting    Complications: none    Transfer of care protocol was followed      Last vitals: Visit Vitals  /65   Pulse 60   Temp 36.5 °C (97.7 °F)   Resp 14   Ht 5' 6" (1.676 m)   Wt 52.2 kg (115 lb 1.3 oz)   LMP  (LMP Unknown)   SpO2 97%   BMI 18.57 kg/m²     "

## 2025-02-22 NOTE — CONSULTS
[facility] - 8th Floor Med Surg  Orthopedics  Consult Note    Patient Name: Lauren Ring  MRN: [id number]  Admission Date: 2/21/2025  Hospital Length of Stay: 0 days  Attending Provider: Agustín Steinberg MD  Primary Care Provider: Jose Juan Dunn MD        Inpatient consult to Orthopedic Surgery  Consult performed by: Clive Martin Jr., MD  Consult ordered by: Garcia Proctor MD        Subjective:     Principal Problem:<principal problem not specified>    Chief Complaint:   Chief Complaint   Patient presents with    Hip Pain     C/o L hip pain x 2 weeks. States fall x 2 weeks ago and pain since. Denies any new recent trauma injury. GCS 15         HPI:  She is a pleasant 64-year-old female who fell a proximally 2 weeks ago after she rolled out of her bed.  She has been nursing the injury since then.  She is mainly stayed in the bed since it happened.  She complains of left leg pain.  It is worse with motion.  It is better at rest.  She denies any new numbness or tingling.    Past Medical History:   Diagnosis Date    Anxiety     Cataract 7/26/2023    Closed nondisplaced pilon fracture of right tibia with routine healing     COPD (chronic obstructive pulmonary disease)     Depression     Draining cutaneous sinus tract 8/2/2022    Emphysema lung     History of lung cancer 10/4/2022    Lung cancer 2017    Maxillary sinusitis 12/15/2022    Osteomyelitis of right ankle 7/26/2023    Primary malignant neoplasm of lung 7/26/2023       Past Surgical History:   Procedure Laterality Date    CARPAL TUNNEL RELEASE Bilateral     CATARACT EXTRACTION Bilateral     HYSTERECTOMY      OPEN REDUCTION AND INTERNAL FIXATION (ORIF) OF INJURY OF ANKLE Right     THORACOTOMY      TONSILLECTOMY         Review of patient's allergies indicates:   Allergen Reactions    Codeine Itching    Influenza virus vaccines Other (See Comments)     Adverse Reaction    Tetanus toxoid        Current Facility-Administered Medications  "  Medication    morphine injection 2 mg    ondansetron injection 4 mg    potassium chloride 10 mEq in 100 mL IVPB     Family History       Problem Relation (Age of Onset)    Cancer Sister, Brother    Heart disease Father    Mental illness Mother          Tobacco Use    Smoking status: Never    Smokeless tobacco: Never   Substance and Sexual Activity    Alcohol use: Yes    Drug use: Never    Sexual activity: Not on file     ROS  Objective:     Vital Signs (Most Recent):  Temp: 98 °F (36.7 °C) (02/21/25 1728)  Pulse: 81 (02/21/25 1728)  Resp: 18 (02/21/25 1728)  BP: 110/73 (02/21/25 1728)  SpO2: 96 % (02/21/25 1728) Vital Signs (24h Range):  Temp:  [97.9 °F (36.6 °C)-98 °F (36.7 °C)] 98 °F (36.7 °C)  Pulse:  [73-89] 81  Resp:  [11-19] 18  SpO2:  [93 %-96 %] 96 %  BP: (103-134)/(64-79) 110/73     Weight: 52.2 kg (115 lb)  Height: 5' 6" (167.6 cm)  Body mass index is 18.56 kg/m².    No intake or output data in the 24 hours ending 02/21/25 1817    Ortho/SPM Exam  Musculoskeletal:   Neck: no pain with range of motion, no tenderness to palpation  Right upper extremity: no swelling; no deformity; no open wounds; compartments are soft and compressible; no pain with ROM at the shoulder, elbow, wrist, or digits, no tenderness to palpation; AIN/PIN/Ulnar motor intact; SILT distally;BCR distally; Radial pulse 2+  Left upper extremity: no swelling; no deformity; no open wounds; compartments are soft and compressible; no pain with ROM at the shoulder, elbow, wrist, or digits, no tenderness to palpation; AIN/PIN/Ulnar motor intact; SILT distally;BCR distally; Radial pulse 2+  Right lower extremity: no swelling; no deformity; no open wounds; compartments are soft and compressible; No pain with ROM at the hip, knee, or ankle; no tenderness to palpation; dorsi/plantar flexes the foot; SILT distally; BCR distally; DP pulse 2+  Left lower extremity:  Decreased range of motion of the hip and knee secondary to hip pain.; dorsi/plantar " flexes the foot; SILT distally; BCR distally; DP pulse 2+     Significant Labs: All pertinent labs within the past 24 hours have been reviewed.  None  Recent Lab Results         02/21/25  1429   02/21/25  1413   02/21/25  1359        Albumin/Globulin Ratio   0.8         Albumin   2.9         ALP   78         ALT   14         Anion Gap   11.0         PTT 29.3  Comment: For Minimal Heparin Infusion, the goal aPTT 64-85 seconds corresponds to an anti-Xa of 0.3-0.5.    For Low Intensity and High Intensity Heparin, the goal aPTT  seconds corresponds to an anti-Xa of 0.3-0.7           AST   28         Baso #   0.14         Basophil %   1.3         BILIRUBIN TOTAL   0.4         BUN   7.2         BUN/CREAT RATIO   11         Calcium   8.9         Chloride   103         CO2   24         Creatinine   0.63         eGFR   >60  Comment: Estimated GFR calculated using the CKD-EPI creatinine (2021) equation.         Eos #   0.20         Eos %   1.9         Globulin, Total   3.5         Glucose   92         Group & Rh   A POS         Hematocrit   37.7         Hemoglobin   11.8         Immature Grans (Abs)   0.03         Immature Granulocytes   0.3         Indirect Luis GEL   NEG         INR 1.1           Lymph #   1.35         LYMPH %   12.7         MCH   25.5         MCHC   31.3         MCV   81.4         Mono #   0.92         Mono %   8.6         MPV   9.8         Neut #   8.01         Neut %   75.2         nRBC   0.0         QRS Duration     78       [facility] QTC Calculation     473       Platelet Count   369         Potassium   2.6  Comment: Critical Result called and verified by verbal readback to: elizabeth hendricks on 02/21/2025 at 15:01. Reported by [id number].         PROTEIN TOTAL   6.4         PT 14.7           RBC   4.63         RDW   18.8         Sodium   138         Specimen Outdate   02/24/2025 23:59         WBC   10.65                  Significant Imaging:   Radiographs reviewed which shows a displaced femoral neck  fracture.    Assessment/Plan:     I have recommended surgical intervention:  Left hip hemiarthroplasty.  We discussed the details of the procedure and expected postoperative course.  We discussed the benefits of surgery which would be to stabilize the fracture.  We discussed the risks of surgery which is small but could be significant if she has propagation of the fracture, nerve injury, pain, stiffness, infection, instability.  After discussion she would like to proceed.  Plans for surgery Saturday February 22nd            Clive Martin Jr, MD  Orthopedic Surgery and Sports Medicine  [facility] - 8th Floor Med Surg

## 2025-02-22 NOTE — H&P
[facility]  Hospital Medicine History & Physical Examination       Patient Name: Lauren Ring  MRN: [id number]  Patient Class: IP- Inpatient   Admission Date: 2/21/2025   Admitting Physician:  Service   Length of Stay: 0  Attending Physician: Agustín Steinberg MD  Primary Care Provider: Jose Juan Dunn MD  Face-to-Face encounter date: 02/21/2025  Code Status:  Chief Complaint: Hip Pain (C/o L hip pain x 2 weeks. States fall x 2 weeks ago and pain since. Denies any new recent trauma injury. GCS 15 )      Screening for Social Drivers for health:  Patient screened for food insecurity, housing instability, transportation needs, utility difficulties, and interpersonal safety (select all that apply as identified as concern)  []Housing or Food  []Transportation Needs  []Utility Difficulties  []Interpersonal safety  Full code None      Patient information was obtained from patient, patient's family, past medical records and ER records.  ED records were reviewed in detail and documented below    HISTORY OF PRESENT ILLNESS:   Lauren Ring is a 64 y.o. 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 The patient presented to [facility] on 2/21/2025 with a primary complaint of pain to left hip after falling from bed around 2 weeks ago.  Patient was seen at emergency room [facility] and diagnosed with displaced left femoral neck fracture with a shortening.  Patient has been seen by Orthopedics with plans for left hip arthroplasty tomorrow.  Additional history includes hepatitis-C  treated as well as lung cancer status post lobectomy.  Patient is supposed to be on O2 but not currently.  We will go ahead and place her on tele.  Patient voices no complaints.  No distress and pain appears to be controlled.  Physical examination her left leg is shortened and laterally displaced.      PAST MEDICAL HISTORY:     Past Medical History:   Diagnosis Date    Anxiety     Cataract 7/26/2023    Closed nondisplaced pilon fracture of right tibia with routine healing     COPD (chronic obstructive pulmonary disease)     Depression     Draining cutaneous sinus tract 8/2/2022    Emphysema lung     History of lung cancer 10/4/2022    Lung cancer 2017    Maxillary sinusitis 12/15/2022    Osteomyelitis of right ankle 7/26/2023    Primary malignant neoplasm of lung 7/26/2023       PAST SURGICAL HISTORY:     Past Surgical History:   Procedure Laterality Date    CARPAL TUNNEL RELEASE Bilateral     CATARACT EXTRACTION Bilateral     HYSTERECTOMY      OPEN REDUCTION AND INTERNAL FIXATION (ORIF) OF INJURY OF ANKLE Right     THORACOTOMY      TONSILLECTOMY         ALLERGIES:   Codeine, Influenza virus vaccines, and Tetanus toxoid    FAMILY HISTORY:   Reviewed and negative    SOCIAL HISTORY:     Social History     Tobacco Use    Smoking status: Never    Smokeless tobacco: Never   Substance Use Topics    Alcohol use: Yes        HOME MEDICATIONS:     Prior to Admission medications    Medication Sig Start Date End Date Taking? Authorizing Provider   albuterol (PROAIR HFA) 90 mcg/actuation inhaler Inhale 2 puffs into the lungs every 6 (six) hours as needed for Wheezing. 9/25/24 9/25/25  Jose Juan Dunn MD   albuterol-ipratropium (DUO-NEB) 2.5 mg-0.5 mg/3 mL nebulizer solution Take 3 mLs by nebulization 3 (three) times daily as needed for Wheezing. 10/21/24 10/21/25  Jose Juan Dunn MD   ALPRAZolam (XANAX) 0.5 MG tablet Take one tablet in the morning, one tablet in the late afternoon, and two tablets each evening before bedtime.  10/21/24   Jose Juan Dunn MD   aspirin (ECOTRIN) 81 MG EC tablet Take 81 mg by mouth once daily.    Provider, Historical   azithromycin (Z-GIRISH) 250 MG tablet Take 2 tablets by mouth on day 1; Take 1 tablet by mouth on days 2-5 2/3/25   Jose Juan Dunn MD   famotidine (PEPCID) 40 MG tablet Take 1 tablet (40 mg total) by mouth once daily. 7/27/23 7/26/24  Jose Juan Dunn MD   ferrous sulfate (FEOSOL) Tab tablet Take 1 tablet by mouth daily with breakfast.    Provider, Historical   loratadine (CLARITIN) 10 mg tablet Take 10 mg by mouth once daily.    Provider, Historical   oxybutynin (DITROPAN) 5 MG Tab Take 1 tablet (5 mg total) by mouth 3 (three) times daily. 2/3/25 2/3/26  Jose Juan Dunn MD   SPIRIVA RESPIMAT 2.5 mcg/actuation inhaler Inhale 2 puffs into the lungs Daily.    Provider, Historical   SYMBICORT 160-4.5 mcg/actuation HFAA Inhale 2 puffs into the lungs every 12 (twelve) hours. 2/12/24 2/11/25  Jose Juan Dunn MD   vortioxetine (TRINTELLIX) 5 mg Tab Take 1 tablet (5 mg total) by mouth once daily. 9/19/24 9/19/25  Jose Juan Dunn MD       REVIEW OF SYSTEMS:   Except as documented, all other systems reviewed and negative     PHYSICAL EXAM:     VITAL SIGNS: 24 HRS MIN & MAX LAST   Temp  Min: 97.9 °F (36.6 °C)  Max: 98 °F (36.7 °C) 97.9 °F (36.6 °C)   BP  Min: 103/76  Max: 134/77 113/76   Pulse  Min: 73  Max: 89  80   Resp  Min: 11  Max: 19 16   SpO2  Min: 93 %  Max: 96 % (!) 94 %     General appearance: Well-developed, well-nourished female in no apparent distress.  HENT: Atraumatic head. Moist mucous membranes of oral cavity.  Eyes: Normal extraocular movements.   Neck: Supple.   Lungs: Clear to auscultation bilaterally. No wheezing present.   Heart: Regular rate and rhythm. S1 and S2 present with no murmurs/gallop/rub. No pedal edema. No JVD present.   Abdomen: Soft, non-distended, non-tender. No rebound tenderness/guarding. Bowel sounds are normal.   Extremities: No cyanosis, clubbing, or edema.  Left  lower extremity shortened and laterally displaced.  Tender left hip.  Decreased range of motion  Skin: No Rash.   Neuro: Motor and sensory exams grossly intact. Good tone. Muscle strength 5/5 in all 4 extremities  Psych/mental status: Appropriate mood and affect. Responds appropriately to questions.     LABS AND IMAGING:     Recent Labs   Lab 02/21/25  1413   WBC 10.65   RBC 4.63   HGB 11.8*   HCT 37.7   MCV 81.4   MCH 25.5*   MCHC 31.3*   RDW 18.8*      MPV 9.8       Recent Labs   Lab 02/21/25  1413      K 2.6*      CO2 24   BUN 7.2*   CREATININE 0.63   CALCIUM 8.9   ALBUMIN 2.9*   ALKPHOS 78   ALT 14   AST 28   BILITOT 0.4       Microbiology Results (last 7 days)       ** No results found for the last 168 hours. **             X-Ray Chest AP Portable  Narrative: EXAMINATION:  XR CHEST AP PORTABLE    CLINICAL HISTORY:  Fracture of unspecified part of neck of unspecified femur, initial encounter for closed fracture    TECHNIQUE:  Single frontal view of the chest was performed.    COMPARISON:  July 28, 2022    FINDINGS:  The cardiomediastinal silhouette is within normal limits.  Minimal left basilar atelectatic banding is seen.  The remainder lung fields appear clear.  No pleural effusion or pneumothorax are noted.  Impression: Minimal left basilar atelectatic banding is seen.    Electronically signed by: Dawit Mnariquez  Date:    02/21/2025  Time:    14:42  X-Ray Hip 2 or 3 views Left with Pelvis when performed  EXAMINATION:  XR HIP WITH PELVIS WHEN PERFORMED 2 OR 3 VIEWS LEFT    CLINICAL HISTORY:  hip pain;    TECHNIQUE:  AP view of the pelvis and AP and frog leg lateral view of the left hip were performed.    COMPARISON:  11/15/2016    FINDINGS:  BONE: Age-indeterminate displaced left femoral neck fracture with foreshortening.    SOFT TISSUES: Unremarkable.    Electronically signed by: Rani Ng  Date:    02/21/2025  Time:    14:12      ASSESSMENT & PLAN:   1.-fall from bed   2.-left  femoral neck fracture with shortening  3-hypokalemia-replace, we will check magnesium    History of COPD/emphysema/lung cancer/lobectomy/hepatitis-C treated/anxiety/depression      Patient has been seen by Orthopedics with plans for left hip arthroplasty tomorrow.  Patient has no complaints pain is controlled        VTE Prophylaxis: will be placed on Heparin/Lovenox/ Xarelto/ SCD for DVT prophylaxis and will be advised to be as mobile as possible and sit in a chair as tolerated    Patient condition:  Stable/Fair/Guarded/ Serious/ Critical    __________________________________________________________________________  INPATIENT LIST OF MEDICATIONS     Scheduled Meds:  Continuous Infusions:  PRN Meds:.  Current Facility-Administered Medications:     morphine, 2 mg, Intravenous, Q6H PRN    ondansetron, 4 mg, Intravenous, Q4H PRN            Discharge Planning and Disposition: No mobility needs. Ambulating well. Good social support system.   Anticipated discharge    If patient was admitted under observational status it is with my approval/permission.        All diagnosis and differential diagnosis have been reviewed; assessment and plan has been documented; I have personally reviewed the labs and test results that are presently available; I have reviewed the patients medication list; I have reviewed the consulting providers response and recommendations. I have reviewed or attempted to review medical records based upon their availability.    All of the patient and family questions have been addressed and answered. Patient's is agreeable to the above stated plan. I will continue to monitor closely and make adjustments to medical management as needed.    If patient was admitted under observational status it is with my approval/permission.      Agustín Steinbreg MD   02/21/2025

## 2025-02-22 NOTE — OP NOTE
DATE OF SERVICE: 02/22/2025    SURGEON: Clive Martin MD    PREOPERATIVE DIAGNOSIS: Left Displaced femoral neck fracture    POSTOPERATIVE DIAGNOSIS: Left Displaced femoral neck fracture    PROCEDURE PERFORMED: Left Hip hemiarthroplasty    ASSISTANT: Kailee Valera NP. Mrs. Valera was essential in manipulation of the hip, retraction, reduction, and closure.    ANESTHESIA: General    ESTIMATED BLOOD LOSS: 200cc    COMPLICATIONS: None.    IMPLANTS:    1.  Stem: [place] Size 1 high off set insignia,   Head: 26 -3 mm, Bipolar 42 mm    INDICATIONS FOR PROCEDURE: Lauren Ring is a 64 y.o. year old who sustained a fall onto the left hip. The patient was seen in the emergency department and preoperative imaging revealed a displaced femoral neck fracture.  I evaluated the patient and recommended operative intervention.. Risks and benefits were thoroughly explained and consent was obtained prior to today's procedure.    DESCRIPTION OF PROCEDURE: The patient was seen in the preoperative holding area where the history and physical were reviewed without change. The operative hip was marked, consents were reviewed, and any questions were answered for the patient.  The patient was induced under general anesthesia. Next the patient was positioned laterally on the table with care taken to ensure the  face, eyes and all bony prominences well protected. Preoperative time-out led by the surgeon was performed verifying the patient, procedure, and preoperative antibiotics.  The left lower extremity was then prepped and draped in the usual sterile fashion.    I made an incision 3 fingerbreadths proximal to the greater trochanter.  I sharply incised through skin and subcutaneous tissue in a standard Kocher approach.  I came through the fascial layer and used a Charnley retractor for retraction.  I then identified the piriformis tendon and released this from its insertion I carried this release distally through the external rotators until I  was a fingerbreadth above the lesser trochanter.  I was able to identify the fracture site.  I freshened up the fracture site using a sagittal saw.  I then removed the retained femoral head from the acetabulum and sized the head on the back table.  I then trialed a similar sized bipolar implant until stability was achieved.  I then turned my attention to the stem.  Using a double prong retractor around the lesser trochanter, I was able to deliver the proximal femur into the wound.  I cleaned out the lateral saddle.  I then used a cookie  order to start laterally along the proximal femur.  I used a canal finder and then sequentially broached until I achieved stability.  I then trialed my implant and was happy with both stability and leg length.  Following this, I removed the trial implants and impacted the final implants into place.  I irrigated out the wound with pulse irrigation. I additionally used a chlorhexidine bath.  I then repaired the external rotators using a bone tunnel technique.  Joint juice was injected around the incision.  I closed the fascia with #1 Vicryl.  The subcutaneous layer was closed with a 2-0 Monocryl.  The skin was closed with a running Monocryl suture and Dermabond was placed on top.  A sterile dressing was applied.  The patient was awoken from anesthesia and transferred to the postop unit in stable condition

## 2025-02-22 NOTE — ANESTHESIA PROCEDURE NOTES
Intubation    Date/Time: 2/22/2025 3:48 PM    Performed by: Herrera Strickland CRNA  Authorized by: Juliano Up MD    Intubation:     Induction:  Intravenous    Intubated:  Postinduction    Mask Ventilation:  Easy with oral airway    Attempts:  1    Attempted By:  CRNA    Method of Intubation:  Direct    Blade:  Smith 2    Laryngeal View Grade: Grade IIA - cords partially seen      Difficult Airway Encountered?: No      Complications:  None    Airway Device:  Oral endotracheal tube    Airway Device Size:  7.0    Style/Cuff Inflation:  Cuffed (inflated to minimal occlusive pressure)    Inflation Amount (mL):  6    Tube secured:  21    Secured at:  The lips    Placement Verified By:  Capnometry    Complicating Factors:  None    Findings Post-Intubation:  BS equal bilateral and atraumatic/condition of teeth unchanged

## 2025-02-23 LAB
ALBUMIN SERPL-MCNC: 2.7 G/DL (ref 3.4–4.8)
ALBUMIN/GLOB SERPL: 0.9 RATIO (ref 1.1–2)
ALP SERPL-CCNC: 83 UNIT/L (ref 40–150)
ALT SERPL-CCNC: 15 UNIT/L (ref 0–55)
ANION GAP SERPL CALC-SCNC: 10 MEQ/L
AST SERPL-CCNC: 26 UNIT/L (ref 5–34)
BASOPHILS # BLD AUTO: 0.04 X10(3)/MCL
BASOPHILS NFR BLD AUTO: 0.3 %
BILIRUB SERPL-MCNC: 0.3 MG/DL
BUN SERPL-MCNC: 6.4 MG/DL (ref 9.8–20.1)
CALCIUM SERPL-MCNC: 8.5 MG/DL (ref 8.4–10.2)
CHLORIDE SERPL-SCNC: 103 MMOL/L (ref 98–107)
CO2 SERPL-SCNC: 23 MMOL/L (ref 23–31)
CREAT SERPL-MCNC: 0.7 MG/DL (ref 0.55–1.02)
CREAT/UREA NIT SERPL: 9
EOSINOPHIL # BLD AUTO: 0 X10(3)/MCL (ref 0–0.9)
EOSINOPHIL NFR BLD AUTO: 0 %
ERYTHROCYTE [DISTWIDTH] IN BLOOD BY AUTOMATED COUNT: 18.7 % (ref 11.5–17)
GFR SERPLBLD CREATININE-BSD FMLA CKD-EPI: >60 ML/MIN/1.73/M2
GLOBULIN SER-MCNC: 3 GM/DL (ref 2.4–3.5)
GLUCOSE SERPL-MCNC: 162 MG/DL (ref 82–115)
HCT VFR BLD AUTO: 34.1 % (ref 37–47)
HGB BLD-MCNC: 10.3 G/DL (ref 12–16)
IMM GRANULOCYTES # BLD AUTO: 0.06 X10(3)/MCL (ref 0–0.04)
IMM GRANULOCYTES NFR BLD AUTO: 0.4 %
LYMPHOCYTES # BLD AUTO: 0.6 X10(3)/MCL (ref 0.6–4.6)
LYMPHOCYTES NFR BLD AUTO: 3.9 %
MAGNESIUM SERPL-MCNC: 1.8 MG/DL (ref 1.6–2.6)
MCH RBC QN AUTO: 25.6 PG (ref 27–31)
MCHC RBC AUTO-ENTMCNC: 30.2 G/DL (ref 33–36)
MCV RBC AUTO: 84.6 FL (ref 80–94)
MONOCYTES # BLD AUTO: 0.63 X10(3)/MCL (ref 0.1–1.3)
MONOCYTES NFR BLD AUTO: 4.1 %
NEUTROPHILS # BLD AUTO: 14.04 X10(3)/MCL (ref 2.1–9.2)
NEUTROPHILS NFR BLD AUTO: 91.3 %
NRBC BLD AUTO-RTO: 0 %
PLATELET # BLD AUTO: 358 X10(3)/MCL (ref 130–400)
PMV BLD AUTO: 9.7 FL (ref 7.4–10.4)
POTASSIUM SERPL-SCNC: 4 MMOL/L (ref 3.5–5.1)
PROT SERPL-MCNC: 5.7 GM/DL (ref 5.8–7.6)
RBC # BLD AUTO: 4.03 X10(6)/MCL (ref 4.2–5.4)
SODIUM SERPL-SCNC: 136 MMOL/L (ref 136–145)
WBC # BLD AUTO: 15.37 X10(3)/MCL (ref 4.5–11.5)

## 2025-02-23 PROCEDURE — 97166 OT EVAL MOD COMPLEX 45 MIN: CPT

## 2025-02-23 PROCEDURE — 25000003 PHARM REV CODE 250: Performed by: INTERNAL MEDICINE

## 2025-02-23 PROCEDURE — 36415 COLL VENOUS BLD VENIPUNCTURE: CPT | Performed by: INTERNAL MEDICINE

## 2025-02-23 PROCEDURE — 11000001 HC ACUTE MED/SURG PRIVATE ROOM

## 2025-02-23 PROCEDURE — 25000242 PHARM REV CODE 250 ALT 637 W/ HCPCS: Performed by: INTERNAL MEDICINE

## 2025-02-23 PROCEDURE — 83735 ASSAY OF MAGNESIUM: CPT | Performed by: INTERNAL MEDICINE

## 2025-02-23 PROCEDURE — 80053 COMPREHEN METABOLIC PANEL: CPT | Performed by: INTERNAL MEDICINE

## 2025-02-23 PROCEDURE — 63600175 PHARM REV CODE 636 W HCPCS: Performed by: NURSE PRACTITIONER

## 2025-02-23 PROCEDURE — 97162 PT EVAL MOD COMPLEX 30 MIN: CPT

## 2025-02-23 PROCEDURE — 99900035 HC TECH TIME PER 15 MIN (STAT)

## 2025-02-23 PROCEDURE — 99900031 HC PATIENT EDUCATION (STAT)

## 2025-02-23 PROCEDURE — 85025 COMPLETE CBC W/AUTO DIFF WBC: CPT | Performed by: INTERNAL MEDICINE

## 2025-02-23 RX ORDER — OXYCODONE AND ACETAMINOPHEN 10; 325 MG/1; MG/1
1 TABLET ORAL EVERY 6 HOURS PRN
Refills: 0 | Status: DISCONTINUED | OUTPATIENT
Start: 2025-02-23 | End: 2025-03-07 | Stop reason: HOSPADM

## 2025-02-23 RX ORDER — ENOXAPARIN SODIUM 100 MG/ML
40 INJECTION SUBCUTANEOUS EVERY 24 HOURS
Status: DISCONTINUED | OUTPATIENT
Start: 2025-02-24 | End: 2025-03-07 | Stop reason: HOSPADM

## 2025-02-23 RX ADMIN — MORPHINE SULFATE 2 MG: 4 INJECTION, SOLUTION INTRAMUSCULAR; INTRAVENOUS at 08:02

## 2025-02-23 RX ADMIN — OXYBUTYNIN CHLORIDE 5 MG: 5 TABLET ORAL at 09:02

## 2025-02-23 RX ADMIN — OXYBUTYNIN CHLORIDE 5 MG: 5 TABLET ORAL at 04:02

## 2025-02-23 RX ADMIN — MORPHINE SULFATE 2 MG: 4 INJECTION, SOLUTION INTRAMUSCULAR; INTRAVENOUS at 05:02

## 2025-02-23 RX ADMIN — ALPRAZOLAM 0.5 MG: 0.5 TABLET ORAL at 04:02

## 2025-02-23 RX ADMIN — TIOTROPIUM BROMIDE INHALATION SPRAY 2 PUFF: 3.12 SPRAY, METERED RESPIRATORY (INHALATION) at 04:02

## 2025-02-23 RX ADMIN — FERROUS SULFATE TAB 325 MG (65 MG ELEMENTAL FE) 1 EACH: 325 (65 FE) TAB at 08:02

## 2025-02-23 RX ADMIN — OXYBUTYNIN CHLORIDE 5 MG: 5 TABLET ORAL at 08:02

## 2025-02-23 RX ADMIN — FLUTICASONE FUROATE AND VILANTEROL TRIFENATATE 1 PUFF: 100; 25 POWDER RESPIRATORY (INHALATION) at 08:02

## 2025-02-23 RX ADMIN — CETIRIZINE HYDROCHLORIDE 10 MG: 10 TABLET, FILM COATED ORAL at 08:02

## 2025-02-23 RX ADMIN — OXYCODONE AND ACETAMINOPHEN 1 TABLET: 10; 325 TABLET ORAL at 09:02

## 2025-02-23 RX ADMIN — FAMOTIDINE 40 MG: 20 TABLET, FILM COATED ORAL at 08:02

## 2025-02-23 RX ADMIN — MORPHINE SULFATE 2 MG: 4 INJECTION, SOLUTION INTRAMUSCULAR; INTRAVENOUS at 12:02

## 2025-02-23 NOTE — PROGRESS NOTES
[facility]  Hospital Medicine Progress Note        Chief Complaint: Inpatient Follow-up for Hip Pain (C/o L hip pain x 2 weeks. States fall x 2 weeks ago and pain since. Denies any new recent trauma injury. GCS 15 )    HPI: Lauren Ring is a 64 y.o. 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 The patient presented to [facility] on 2/21/2025 with a primary complaint of pain to left hip after falling from bed around 2 weeks ago.  Patient was seen at emergency room [facility] and diagnosed with displaced left femoral neck fracture with a shortening.  Patient has been seen by Orthopedics with plans for left hip arthroplasty tomorrow.  Additional history includes hepatitis-C treated as well as lung cancer status post lobectomy.  Patient is supposed to be on O2 but not currently.  We will go ahead and place her on tele.  Patient voices no complaints.  No distress and pain appears to be controlled.  Physical examination her left leg is shortened and laterally displaced.    Interval Hx:     02/22/2025 Dr. Steinberg-patient is status post left hip hemiarthroplasty without complications.  She remains alert/active and oriented and requesting food.    Case was discussed with patient's nurse and  on the floor.    Objective/physical exam:  General: In no acute distress, afebrile  Chest:decrease breath sounds right lung field  Heart: RRR, +S1, S2, no appreciable murmur  Abdomen: Soft, nontender, BS +  MSK: Warm, no lower extremity edema, no clubbing or  cyanosis  Neurologic: Alert and oriented x4, Cranial nerve II-XII intact, Strength 5/5 in all 4 extremities    VITAL SIGNS: 24 HRS MIN & MAX LAST   Temp  Min: 97.7 °F (36.5 °C)  Max: 98.7 °F (37.1 °C) 98.2 °F (36.8 °C)   BP  Min: 92/65  Max: 139/84 139/84   Pulse  Min: 60  Max: 91  91   Resp  Min: 13  Max: 23 13   SpO2  Min: 92 %  Max: 100 % 98 %     I have reviewed the following labs:  Recent Labs   Lab 02/21/25  1413 02/22/25  0446   WBC 10.65 7.85   RBC 4.63 4.27   HGB 11.8* 10.9*   HCT 37.7 35.9*   MCV 81.4 84.1   MCH 25.5* 25.5*   MCHC 31.3* 30.4*   RDW 18.8* 19.0*    335   MPV 9.8 9.6     Recent Labs   Lab 02/21/25  1413 02/22/25  0446    140   K 2.6* 3.9    107   CO2 24 27   BUN 7.2* 6.8*   CREATININE 0.63 0.64   CALCIUM 8.9 8.7   MG  --  1.80   ALBUMIN 2.9* 2.6*   ALKPHOS 78 79   ALT 14 12   AST 28 22   BILITOT 0.4 0.3     Microbiology Results (last 7 days)       ** No results found for the last 168 hours. **             See below for Radiology    Assessment/Plan:  1.-fall from bed   2.-left femoral neck fracture with shortening  -status post left hip hemiarthroplasty 02/22/2025  3-hypokalemia-replaced     History of COPD/emphysema/lung cancer/lobectomy/hepatitis-C treated/anxiety/depression        PT/OT/ for discharge planning    VTE prophylaxis:     Patient condition:  Stable    Anticipated discharge and Disposition:   tbd      All diagnosis and differential diagnosis have been reviewed; assessment and plan has been documented; I have personally reviewed the labs and test results that are presently available; I have reviewed the patients medication list; I have reviewed the consulting providers response and recommendations. I have reviewed or attempted to review medical records based upon their availability    All of the patient's questions have been  addressed and answered. Patient's is agreeable to the above stated plan. I will continue to monitor closely and make  adjustments to medical management as needed.    Portions of this note dictated using EMR integrated voice recognition software, and may be subject to voice recognition errors not corrected at proofreading. Please contact writer for clarification if needed.   _____________________________________________________________________    Malnutrition Status:  Nutrition consulted. Most recent weight and BMI monitored-     Measurements:  Wt Readings from Last 1 Encounters:   02/21/25 52.2 kg (115 lb 1.3 oz)   Body mass index is 18.57 kg/m².    Patient has been screened and assessed by RD.    Malnutrition Type:  Context:    Level:      Malnutrition Characteristic Summary:       Interventions/Recommendations (treatment strategy):        Scheduled Med:   cetirizine  10 mg Oral Daily    famotidine  40 mg Oral Daily    ferrous sulfate  1 tablet Oral Daily with breakfast    fluticasone furoate-vilanteroL  1 puff Inhalation Daily    oxybutynin  5 mg Oral TID    tiotropium bromide  2 puff Inhalation Daily      Continuous Infusions:     PRN Meds:    Current Facility-Administered Medications:     albuterol, 2 puff, Inhalation, Q6H PRN    albuterol-ipratropium, 3 mL, Nebulization, TID PRN    ALPRAZolam, 0.5 mg, Oral, TID PRN    morphine, 2 mg, Intravenous, Q4H PRN    ondansetron, 4 mg, Intravenous, Q4H PRN     Radiology:  I have personally reviewed the following imaging and agree with the radiologist.     X-Ray Chest AP Portable  Narrative: EXAMINATION:  XR CHEST AP PORTABLE    CLINICAL HISTORY:  Fracture of unspecified part of neck of unspecified femur, initial encounter for closed fracture    TECHNIQUE:  Single frontal view of the chest was performed.    COMPARISON:  July 28, 2022    FINDINGS:  The cardiomediastinal silhouette is within normal limits.  Minimal left basilar atelectatic banding is seen.  The remainder lung fields appear clear.  No pleural effusion or pneumothorax are noted.  Impression: Minimal left basilar atelectatic  banding is seen.    Electronically signed by: Dawit Manriquez  Date:    02/21/2025  Time:    14:42  X-Ray Hip 2 or 3 views Left with Pelvis when performed  EXAMINATION:  XR HIP WITH PELVIS WHEN PERFORMED 2 OR 3 VIEWS LEFT    CLINICAL HISTORY:  hip pain;    TECHNIQUE:  AP view of the pelvis and AP and frog leg lateral view of the left hip were performed.    COMPARISON:  11/15/2016    FINDINGS:  BONE: Age-indeterminate displaced left femoral neck fracture with foreshortening.    SOFT TISSUES: Unremarkable.    Electronically signed by: Rani Ng  Date:    02/21/2025  Time:    14:12      Agustín Steinberg MD  Department of Hospital Medicine   [facility]   02/22/2025

## 2025-02-23 NOTE — PROGRESS NOTES
".[facility] - 8th Floor Med Surg  Orthopedics  Progress Note    Patient Name: Lauren Ring  MRN: [id number]  Admission Date: 2/21/2025  Hospital Length of Stay: 2 days  Attending Provider: Agustín Steinberg MD  Primary Care Provider: Jose Juan Dunn MD  Follow-up For: Procedure(s) (LRB):  HEMIARTHROPLASTY, HIP, POSTERIOR APPROACH (Left)    Post-Operative Day: 1 Day Post-Op  Subjective:     Principal Problem:<principal problem not specified>    Principal Orthopedic Problem: 1 Day Post-Op     Interval History:  Resting in bed.  Abduction pillow in place    Review of patient's allergies indicates:   Allergen Reactions    Codeine Itching    Influenza virus vaccines Other (See Comments)     Adverse Reaction    Tetanus toxoid        Current Facility-Administered Medications   Medication    albuterol inhaler 2 puff    albuterol-ipratropium 2.5 mg-0.5 mg/3 mL nebulizer solution 3 mL    ALPRAZolam tablet 0.5 mg    cetirizine tablet 10 mg    famotidine tablet 40 mg    ferrous sulfate tablet 1 each    fluticasone furoate-vilanteroL 100-25 mcg/dose diskus inhaler 1 puff    morphine injection 2 mg    ondansetron injection 4 mg    oxybutynin tablet 5 mg    tiotropium bromide 2.5 mcg/actuation inhaler 2 puff     Objective:     Vital Signs (Most Recent):  Temp: 99.3 °F (37.4 °C) (02/23/25 1136)  Pulse: (!) 112 (02/23/25 1136)  Resp: 20 (02/23/25 1136)  BP: 110/65 (02/23/25 1136)  SpO2: (!) 92 % (02/23/25 1136) Vital Signs (24h Range):  Temp:  [97.7 °F (36.5 °C)-99.3 °F (37.4 °C)] 99.3 °F (37.4 °C)  Pulse:  [] 112  Resp:  [13-23] 20  SpO2:  [91 %-100 %] 92 %  BP: ()/(61-87) 110/65     Weight: 52.2 kg (115 lb 1.3 oz)  Height: 5' 6" (167.6 cm)  Body mass index is 18.57 kg/m².      Intake/Output Summary (Last 24 hours) at 2/23/2025 1305  Last data filed at 2/23/2025 0457  Gross per 24 hour   Intake 980 ml   Output 575 ml   Net 405 ml       Physical Exam:   Musculoskeletal:   Neck: no pain with range of motion, " no tenderness to palpation  Right upper extremity: no swelling; no deformity; no open wounds; compartments are soft and compressible; no pain with ROM at the shoulder, elbow, wrist, or digits, no tenderness to palpation; AIN/PIN/Ulnar motor intact; SILT distally;BCR distally; Radial pulse 2+    Left upper extremity: no swelling; no deformity; no open wounds; compartments are soft and compressible; no pain with ROM at the shoulder, elbow, wrist, or digits, no tenderness to palpation; AIN/PIN/Ulnar motor intact; SILT distally;BCR distally; Radial pulse 2+    Right lower extremity: no swelling; no deformity; no open wounds; compartments are soft and compressible; No pain with ROM at the hip, knee, or ankle; no tenderness to palpation; dorsi/plantar flexes the foot; SILT distally; BCR distally; DP pulse 2+    Left lower extremity:  Dressing clean dry and intact.; dorsi/plantar flexes the foot; SILT distally; BCR distally; DP pulse 2+      Diagnostic Findings:   Significant Labs:   Recent Lab Results  (Last 5 results in the past 72 hours)        02/23/25  0433   02/22/25  0446   02/21/25  1429   02/21/25  1413   02/21/25  1359        Albumin/Globulin Ratio 0.9   0.9     0.8         Albumin 2.7   2.6     2.9         ALP 83   79     78         ALT 15   12     14         Anion Gap 10.0   6.0     11.0         PTT     29.3  Comment: For Minimal Heparin Infusion, the goal aPTT 64-85 seconds corresponds to an anti-Xa of 0.3-0.5.    For Low Intensity and High Intensity Heparin, the goal aPTT  seconds corresponds to an anti-Xa of 0.3-0.7           AST 26   22     28         Baso # 0.04   0.11     0.14         Basophil % 0.3   1.4     1.3         BILIRUBIN TOTAL 0.3   0.3     0.4         BUN 6.4   6.8     7.2         BUN/CREAT RATIO 9   11     11         Calcium 8.5   8.7     8.9         Chloride 103   107     103         CO2 23   27     24         Creatinine 0.70   0.64     0.63         eGFR >60  Comment: Estimated GFR  calculated using the CKD-EPI creatinine (2021) equation.   >60  Comment: Estimated GFR calculated using the CKD-EPI creatinine (2021) equation.     >60  Comment: Estimated GFR calculated using the CKD-EPI creatinine (2021) equation.         Eos # 0.00   0.21     0.20         Eos % 0.0   2.7     1.9         Globulin, Total 3.0   2.8     3.5         Glucose 162   109     92         Group & Rh       A POS         Hematocrit 34.1   35.9     37.7         Hemoglobin 10.3   10.9     11.8         Immature Grans (Abs) 0.06   0.03     0.03         Immature Granulocytes 0.4   0.4     0.3         Indirect Luis GEL       NEG         INR     1.1           Lymph # 0.60   1.58     1.35         LYMPH % 3.9   20.1     12.7         Magnesium  1.80   1.80             MCH 25.6   25.5     25.5         MCHC 30.2   30.4     31.3         MCV 84.6   84.1     81.4         Mono # 0.63   0.75     0.92         Mono % 4.1   9.6     8.6         MPV 9.7   9.6     9.8         Neut # 14.04   5.17     8.01         Neut % 91.3   65.8     75.2         nRBC 0.0   0.0     0.0         QRS Duration         78       [facility] QTC Calculation         473       Platelet Count 358   335     369         Potassium 4.0   3.9     2.6  Comment: Critical Result called and verified by verbal readback to: elizabeth hendricks on 02/21/2025 at 15:01. Reported by [id number].         PROTEIN TOTAL 5.7   5.4     6.4         PT     14.7           RBC 4.03   4.27     4.63         RDW 18.7   19.0     18.8         Sodium 136   140     138         Specimen Outdate       02/24/2025 23:59         WBC 15.37   7.85     10.65                                   Assessment/Plan:     There are no hospital problems to display for this patient.        PLAN:   Left femoral neck fracture status post left hip hemiarthroplasty 02/22/2025    Weightbear as tolerated left lower extremity   Posterior hip precautions   Complete perioperative antibiotics   DVT prophylaxis   PT OT   Follow up with me in 4 weeks with  radiographs of the left hip

## 2025-02-23 NOTE — PLAN OF CARE
Problem: Physical Therapy  Goal: Physical Therapy Goal  Description: Goals to be met by: 3/23/25     Patient will increase functional independence with mobility by performin. Supine to sit with Set-up [place]  2. Sit to supine with Set-up [place]  3. Sit to stand transfer with Supervision  4. Bed to chair transfer with Supervision using Rolling Walker  5. Gait  x 200 feet with Supervision using Rolling Walker.       Outcome: Progressing

## 2025-02-23 NOTE — PT/OT/SLP EVAL
Physical Therapy Evaluation    Patient Name:  Lauren Ring   MRN:  [id number]    Recommendations:     Discharge therapy intensity: High Intensity Therapy   Discharge Equipment Recommendations:  (TBD by next level of care)   Barriers to discharge: None    Assessment:     Lauren Ring is a 64 y.o. female admitted with a medical diagnosis of  L displaced femoral neck fx following a fall 2 weeks ago; now s/p L hip hemiarthroplasty on 2/22. Prior to admit, patient lived with spouse in a mobile home with a ramp. At baseline, she requires assistance with ADL's and ambulates with a rollator.  She presents with the following impairments/functional limitations: weakness, impaired endurance, impaired self care skills, impaired functional mobility, gait instability, impaired balance, decreased lower extremity function, decreased safety awareness, pain, orthopedic precautions. She required MIN A for bed mobility. MIN A for sit<>stand. Able to take 3 side-steps with RW & MIN A. Limited by pain. Patient to benefit from skilled PT to address deficits, improve functional mobility, and progress towards functional independence. Recommending high intensity therapy at discharge. Progress as tolerated.    Rehab Prognosis: Good; patient would benefit from acute skilled PT services to address these deficits and reach maximum level of function.    Recent Surgery: Procedure(s) (LRB):  HEMIARTHROPLASTY, HIP, POSTERIOR APPROACH (Left) 1 Day Post-Op    Plan:     During this hospitalization, patient would benefit from acute PT services 6 x/week to address the identified rehab impairments via gait training, therapeutic exercises, therapeutic activities, neuromuscular re-education and progress toward the following goals:    Plan of Care Expires:  03/23/25    Subjective     Chief Complaint: pain  Patient/Family Comments/goals: none  Pain/Comfort:  Pain Rating 1: 9/10  Location - Side 1: Left  Location 1: hip  Pain Addressed 1: Distraction,  Reposition  Pain Rating Post-Intervention 1: 9/10    Patients cultural, spiritual, Druze conflicts given the current situation: no    Living Environment:  Prior to admit, patient lived with spouse in a mobile home with a ramp. At baseline, she requires assistance with ADL's and ambulates with a rollator. Equipment used at home: bedside commode, rollator, wheelchair, shower chair. Upon discharge, patient will have assistance from TBD.    Objective:     Communicated with nurse prior to session.  Patient found supine with blood pressure cuff, peripheral IV, hip abduction pillow, PureWick  upon PT entry to room.    General Precautions: Standard, fall  Orthopedic Precautions:LLE posterior precautions, LLE weight bearing as tolerated   Braces: N/A  Respiratory Status: Room air    Exams:  Cognitive Exam:  Patient is oriented to Person, Place, Time, and Situation  Sensation: -       Intact  RLE ROM: WFL  RLE Strength: WFL  LLE ROM: WFL  LLE Strength: -3/5 grossly  Skin integrity: Visible skin intact      Functional Mobility:  Bed Mobility:  Supine to Sit: minimum assistance  Sit to Supine: minimum assistance  Transfers:  Sit to Stand:  minimum assistance with rolling walker  Gait: 3 side-steps with RW & MIN A. Limited by pain.   Balance: fair/poor      AM-PAC 6 CLICK MOBILITY  Total Score:17     Education Provided:  Role and goals of PT, transfer training, bed mobility, gait training, balance training, safety awareness, assistive device, strengthening exercises, and importance of participating in PT to return to PLOF.    Patient left supine with all lines intact, call button in reach, spouse present, and hip ABD pillow placed between legs .    GOALS:   Multidisciplinary Problems       Physical Therapy Goals          Problem: Physical Therapy    Goal Priority Disciplines Outcome Interventions   Physical Therapy Goal     PT, PT/OT Progressing    Description: Goals to be met by: 3/23/25     Patient will increase  functional independence with mobility by performin. Supine to sit with Set-up [place]  2. Sit to supine with Set-up [place]  3. Sit to stand transfer with Supervision  4. Bed to chair transfer with Supervision using Rolling Walker  5. Gait  x 200 feet with Supervision using Rolling Walker.                            History:     Past Medical History:   Diagnosis Date    Anxiety     Cataract 2023    Closed nondisplaced pilon fracture of right tibia with routine healing     COPD (chronic obstructive pulmonary disease)     Depression     Draining cutaneous sinus tract 2022    Emphysema lung     History of lung cancer 10/4/2022    Lung cancer 2017    Maxillary sinusitis 12/15/2022    Osteomyelitis of right ankle 2023    Primary malignant neoplasm of lung 2023       Past Surgical History:   Procedure Laterality Date    CARPAL TUNNEL RELEASE Bilateral     CATARACT EXTRACTION Bilateral     HYSTERECTOMY      OPEN REDUCTION AND INTERNAL FIXATION (ORIF) OF INJURY OF ANKLE Right     THORACOTOMY      TONSILLECTOMY         Time Tracking:     PT Received On: 25  PT Start Time: 0824     PT Stop Time: 0840  PT Total Time (min): 16 min     Billable Minutes: Evaluation 16 minutes      2025

## 2025-02-23 NOTE — ANESTHESIA POSTPROCEDURE EVALUATION
Anesthesia Post Evaluation    Patient: Lauren Ring    Procedure(s) Performed: Procedure(s) (LRB):  HEMIARTHROPLASTY, HIP, POSTERIOR APPROACH (Left)    Final Anesthesia Type: general      Patient location during evaluation: PACU  Patient participation: Yes- Able to Participate  Level of consciousness: awake and alert and oriented  Post-procedure vital signs: reviewed and stable  Pain management: adequate  Airway patency: patent  MARION mitigation strategies: Verification of full reversal of neuromuscular block  PONV status at discharge: No PONV  Anesthetic complications: no      Cardiovascular status: blood pressure returned to baseline and stable  Respiratory status: spontaneous ventilation and unassisted  Hydration status: euvolemic  Follow-up not needed.  Comments: [facility]              Vitals Value Taken Time   /72 02/23/25 07:52   Temp 37.1 °C (98.7 °F) 02/23/25 07:52   Pulse 99 02/23/25 08:53   Resp 18 02/23/25 08:53   SpO2 95 % 02/23/25 07:52         Event Time   Out of Recovery 02/22/2025 18:27:46         Pain/Merline Score: Pain Rating Prior to Med Admin: 8 (2/23/2025  8:49 AM)  Pain Rating Post Med Admin: 8 (2/22/2025  8:00 AM)  Merline Score: 8 (2/22/2025  6:27 PM)

## 2025-02-23 NOTE — PLAN OF CARE
Problem: Adult Inpatient Plan of Care  Goal: Plan of Care Review  Outcome: Progressing  Goal: Patient-Specific Goal (Individualized)  Outcome: Progressing  Goal: Absence of Hospital-Acquired Illness or Injury  Outcome: Progressing  Goal: Optimal Comfort and Wellbeing  Outcome: Progressing  Goal: Readiness for Transition of Care  Outcome: Progressing     Problem: Fall Injury Risk  Goal: Absence of Fall and Fall-Related Injury  Outcome: Progressing     Problem: Pain Acute  Goal: Optimal Pain Control and Function  Outcome: Progressing     Problem: Wound  Goal: Optimal Coping  Outcome: Progressing  Goal: Optimal Functional Ability  Outcome: Progressing  Goal: Absence of Infection Signs and Symptoms  Outcome: Progressing  Goal: Improved Oral Intake  Outcome: Progressing  Goal: Optimal Pain Control and Function  Outcome: Progressing  Goal: Skin Health and Integrity  Outcome: Progressing  Goal: Optimal Wound Healing  Outcome: Progressing

## 2025-02-23 NOTE — PLAN OF CARE
Problem: Occupational Therapy  Goal: Occupational Therapy Goal  Description: LTG: Pt will perform basic ADLs and ADL transfers with Modified independence using LRAD by discharge.    STG: to be met by 3/23/25    Pt will complete grooming standing at sink with LRAD with SBA.  Pt will complete UB dressing with SBA.  Pt will complete LB dressing with SBA using LRAD and AE prn.  Pt will complete toileting with SBA using LRAD.  Pt will complete functional mobility to/from toilet and toilet transfer with SBA using LRAD.   Outcome: Progressing

## 2025-02-23 NOTE — PT/OT/SLP EVAL
Occupational Therapy  Evaluation    Name: Lauren Ring  MRN: [id number]  Admitting Diagnosis: Fall 2 wks ago   Recent Surgery: Procedure(s) (LRB):  HEMIARTHROPLASTY, HIP, POSTERIOR APPROACH (Left) 1 Day Post-Op    Recommendations:     Discharge therapy intensity: High Intensity Therapy   Discharge Equipment Recommendations:  to be determined by next level of care  Barriers to discharge:  Other (Comment) (ongoing medical needs)    Assessment:     Lauren Ring is a 64 y.o. female with a medical diagnosis of L displaced femoral neck fx following a fall 2 weeks ago; now s/p L hip hemiarthroplasty on 2/22. Reached out to ortho MD regarding WB recs; per MD, she is LLE WBAT with posterior precautions. She is A&Ox4 and tolerated OT evaluation well. She reports living with her  and requiring assist for ADLs prior. She presents with the following performance deficits affecting function: weakness, impaired endurance, impaired self care skills, impaired functional mobility, gait instability, impaired balance, decreased safety awareness, decreased lower extremity function, decreased upper extremity function, orthopedic precautions. She required min A for bed mobility and functional t/fs using a rolling walker. Pt reports multiple falls over the past few months and presents as a high fall risk at this time. Recommend high intensity therapy upon d/c.     Rehab Prognosis: Good; patient would benefit from acute skilled OT services to address these deficits and reach maximum level of function.       Plan:     Patient to be seen 6 x/week to address the above listed problems via self-care/home management, therapeutic activities, therapeutic exercises  Plan of Care Expires: 03/23/25  Plan of Care Reviewed with: patient    Subjective     Chief Complaint: LLE pain   Patient/Family Comments/goals: to get better     Occupational Profile:  Living Environment: Pt lives with her  in a mobile home with a ramp entrance. Pt has a  walk-in shower with a shower chair.   Previous level of function: Pt reports requiring assist for ADLs prior.   Roles and Routines: wife   Equipment Used at Home: bedside commode, rollator, wheelchair, shower chair  Assistance upon Discharge: Pt will have assist from her  upon d/c.     Pain/Comfort:  Pain Rating 1: 9/10  Location - Side 1: Left  Location 1: hip  Pain Addressed 1: Reposition, Nurse notified    Patients cultural, spiritual, Congregational conflicts given the current situation: no    Objective:     OT communicated with RN prior to session.      Patient was found HOB elevated with blood pressure cuff, peripheral IV, hip abduction pillow, PureWick upon OT entry to room.    General Precautions: Standard, fall  Orthopedic Precautions: LLE posterior precautions, LLE weight bearing as tolerated  Braces:      Vital Signs: Blood Pressure: 106/71  Respiratory Status: on room air    Bed Mobility:    Patient completed Scooting/Bridging with minimum assistance  Patient completed Supine to Sit with minimum assistance  Patient completed Sit to Supine with minimum assistance    Functional Mobility/Transfers:  Patient completed Sit <> Stand Transfer with minimum assistance  with  rolling walker   Functional Mobility: pt required min A for sit<>stand; pt able to simulate BSC stand pivot with min A using RW as well as take x3 lateral steps towards HOB.     Activities of Daily Living:  Lower Body Dressing: maximal assistance to don socks.     AMPAC 6 Click ADL:  AMPAC Total Score: 17    Functional Cognition:  Orientation: oriented to Person, Place, Time, and Situation  Safety Awareness: Impaired.      Visual Perceptual Skills:  Intact    Upper Extremity Function:  Right Upper Extremity:   Grossly 4/5    Left Upper Extremity:  Grossly 4/5    Balance:   Min A static/dynamic standing balance.     Therapeutic Positioning  Risk for acquired pressure injuries is increased due to relative decrease in mobility d/t  hospitalization  and impaired mobility.    OT interventions performed during the course of today's session:   Therapeutic positioning was provided at the conclusion of session to offload all bony prominences for the prevention and/or reduction of pressure injuries    Skin assessment: all bony prominences were assessed    Findings:  Visible skin intact.     OT recommendations for therapeutic positioning throughout hospitalization:   Follow [facility] Pressure Injury Prevention Protocol  Geomat recommended for sacral protection while UIC    Patient Education:  Patient and spouse were provided with verbal education education regarding OT role/goals/POC, post op precautions, fall prevention, safety awareness, Discharge/DME recommendations, and pressure ulcer prevention.  Understanding was verbalized.     Patient left HOB elevated with hip abduction pillow, all lines intact, call button in reach, RN notified, and spouse present.    GOALS:   Multidisciplinary Problems       Occupational Therapy Goals          Problem: Occupational Therapy    Goal Priority Disciplines Outcome Interventions   Occupational Therapy Goal     OT, PT/OT Progressing    Description: LTG: Pt will perform basic ADLs and ADL transfers with Modified independence using LRAD by discharge.    STG: to be met by 3/23/25    Pt will complete grooming standing at sink with LRAD with SBA.  Pt will complete UB dressing with SBA.  Pt will complete LB dressing with SBA using LRAD and AE prn.  Pt will complete toileting with SBA using LRAD.  Pt will complete functional mobility to/from toilet and toilet transfer with SBA using LRAD.                        History:     Past Medical History:   Diagnosis Date    Anxiety     Cataract 7/26/2023    Closed nondisplaced pilon fracture of right tibia with routine healing     COPD (chronic obstructive pulmonary disease)     Depression     Draining cutaneous sinus tract 8/2/2022    Emphysema lung     History of lung cancer  10/4/2022    Lung cancer 2017    Maxillary sinusitis 12/15/2022    Osteomyelitis of right ankle 7/26/2023    Primary malignant neoplasm of lung 7/26/2023         Past Surgical History:   Procedure Laterality Date    CARPAL TUNNEL RELEASE Bilateral     CATARACT EXTRACTION Bilateral     HYSTERECTOMY      OPEN REDUCTION AND INTERNAL FIXATION (ORIF) OF INJURY OF ANKLE Right     THORACOTOMY      TONSILLECTOMY         Time Tracking:     OT Date of Treatment: 02/23/25  OT Start Time: 0822  OT Stop Time: 0839  OT Total Time (min): 17 min    Billable Minutes:Evaluation Moderate Complexity.     2/23/2025

## 2025-02-24 PROBLEM — E43 SEVERE MALNUTRITION: Status: ACTIVE | Noted: 2025-02-24

## 2025-02-24 PROCEDURE — 25000003 PHARM REV CODE 250: Performed by: INTERNAL MEDICINE

## 2025-02-24 PROCEDURE — 97110 THERAPEUTIC EXERCISES: CPT

## 2025-02-24 PROCEDURE — 27000221 HC OXYGEN, UP TO 24 HOURS

## 2025-02-24 PROCEDURE — 99900031 HC PATIENT EDUCATION (STAT)

## 2025-02-24 PROCEDURE — 99900035 HC TECH TIME PER 15 MIN (STAT)

## 2025-02-24 PROCEDURE — 97535 SELF CARE MNGMENT TRAINING: CPT | Mod: CO

## 2025-02-24 PROCEDURE — 25000242 PHARM REV CODE 250 ALT 637 W/ HCPCS: Performed by: INTERNAL MEDICINE

## 2025-02-24 PROCEDURE — 11000001 HC ACUTE MED/SURG PRIVATE ROOM

## 2025-02-24 PROCEDURE — 97530 THERAPEUTIC ACTIVITIES: CPT

## 2025-02-24 PROCEDURE — 63600175 PHARM REV CODE 636 W HCPCS: Performed by: INTERNAL MEDICINE

## 2025-02-24 RX ORDER — DIPHENHYDRAMINE HCL 25 MG
25 CAPSULE ORAL EVERY 6 HOURS PRN
Status: DISCONTINUED | OUTPATIENT
Start: 2025-02-24 | End: 2025-03-07 | Stop reason: HOSPADM

## 2025-02-24 RX ADMIN — OXYBUTYNIN CHLORIDE 5 MG: 5 TABLET ORAL at 08:02

## 2025-02-24 RX ADMIN — ENOXAPARIN SODIUM 40 MG: 40 INJECTION SUBCUTANEOUS at 04:02

## 2025-02-24 RX ADMIN — OXYCODONE AND ACETAMINOPHEN 1 TABLET: 10; 325 TABLET ORAL at 11:02

## 2025-02-24 RX ADMIN — FLUTICASONE FUROATE AND VILANTEROL TRIFENATATE 1 PUFF: 100; 25 POWDER RESPIRATORY (INHALATION) at 08:02

## 2025-02-24 RX ADMIN — OXYBUTYNIN CHLORIDE 5 MG: 5 TABLET ORAL at 02:02

## 2025-02-24 RX ADMIN — OXYBUTYNIN CHLORIDE 5 MG: 5 TABLET ORAL at 10:02

## 2025-02-24 RX ADMIN — OXYCODONE AND ACETAMINOPHEN 1 TABLET: 10; 325 TABLET ORAL at 08:02

## 2025-02-24 RX ADMIN — ALPRAZOLAM 0.5 MG: 0.5 TABLET ORAL at 06:02

## 2025-02-24 RX ADMIN — FERROUS SULFATE TAB 325 MG (65 MG ELEMENTAL FE) 1 EACH: 325 (65 FE) TAB at 07:02

## 2025-02-24 RX ADMIN — FAMOTIDINE 40 MG: 20 TABLET, FILM COATED ORAL at 08:02

## 2025-02-24 RX ADMIN — DIPHENHYDRAMINE HYDROCHLORIDE 25 MG: 25 CAPSULE ORAL at 11:02

## 2025-02-24 RX ADMIN — CETIRIZINE HYDROCHLORIDE 10 MG: 10 TABLET, FILM COATED ORAL at 08:02

## 2025-02-24 RX ADMIN — OXYCODONE AND ACETAMINOPHEN 1 TABLET: 10; 325 TABLET ORAL at 04:02

## 2025-02-24 RX ADMIN — TIOTROPIUM BROMIDE INHALATION SPRAY 2 PUFF: 3.12 SPRAY, METERED RESPIRATORY (INHALATION) at 04:02

## 2025-02-24 NOTE — PROGRESS NOTES
".[facility] - 8th Floor Med Surg  Orthopedics  Progress Note    Patient Name: Lauren Ring  MRN: [id number]  Admission Date: 2/21/2025  Hospital Length of Stay: 3 days  Attending Provider: Agustín Steinberg MD  Primary Care Provider: Jose Juan Dunn MD  Follow-up For: Procedure(s) (LRB):  HEMIARTHROPLASTY, HIP, POSTERIOR APPROACH (Left)      Subjective:     Principal Problem:<principal problem not specified>    Principal Orthopedic Problem: 2 Days Post-Op     Interval History:  Seen this am. Has just worked with therapy and in chair at bedside. Would like for her to be up at least an hour if possible. Working toward placement.   Review of patient's allergies indicates:   Allergen Reactions    Codeine Itching    Influenza virus vaccines Other (See Comments)     Adverse Reaction    Tetanus toxoid        Current Facility-Administered Medications   Medication    albuterol inhaler 2 puff    albuterol-ipratropium 2.5 mg-0.5 mg/3 mL nebulizer solution 3 mL    ALPRAZolam tablet 0.5 mg    cetirizine tablet 10 mg    enoxaparin injection 40 mg    famotidine tablet 40 mg    ferrous sulfate tablet 1 each    fluticasone furoate-vilanteroL 100-25 mcg/dose diskus inhaler 1 puff    morphine injection 2 mg    ondansetron injection 4 mg    oxybutynin tablet 5 mg    oxyCODONE-acetaminophen  mg per tablet 1 tablet    tiotropium bromide 2.5 mcg/actuation inhaler 2 puff     Objective:     Vital Signs (Most Recent):  Temp: 98.6 °F (37 °C) (02/24/25 1144)  Pulse: (!) 128 (02/24/25 1144)  Resp: 20 (02/24/25 1144)  BP: 102/70 (02/24/25 1144)  SpO2: 98 % (02/24/25 1144) Vital Signs (24h Range):  Temp:  [97.7 °F (36.5 °C)-98.9 °F (37.2 °C)] 98.6 °F (37 °C)  Pulse:  [] 128  Resp:  [16-20] 20  SpO2:  [94 %-98 %] 98 %  BP: ()/(54-70) 102/70     Weight: 52.2 kg (115 lb 1.3 oz)  Height: 5' 5.98" (167.6 cm)  Body mass index is 18.58 kg/m².    No intake or output data in the 24 hours ending 02/24/25 " 1314      Physical Exam:   Musculoskeletal:   Left lower extremity:  Dressing clean dry and intact.; dorsi/plantar flexes the foot; SILT distally; BCR distally; DP pulse 2+      Diagnostic Findings:   Significant Labs:   Recent Lab Results  (Last 5 results in the past 72 hours)        02/23/25  0433   02/22/25  0446   02/21/25  1429   02/21/25  1413   02/21/25  1359        Albumin/Globulin Ratio 0.9   0.9     0.8         Albumin 2.7   2.6     2.9         ALP 83   79     78         ALT 15   12     14         Anion Gap 10.0   6.0     11.0         PTT     29.3  Comment: For Minimal Heparin Infusion, the goal aPTT 64-85 seconds corresponds to an anti-Xa of 0.3-0.5.    For Low Intensity and High Intensity Heparin, the goal aPTT  seconds corresponds to an anti-Xa of 0.3-0.7           AST 26   22     28         Baso # 0.04   0.11     0.14         Basophil % 0.3   1.4     1.3         BILIRUBIN TOTAL 0.3   0.3     0.4         BUN 6.4   6.8     7.2         BUN/CREAT RATIO 9   11     11         Calcium 8.5   8.7     8.9         Chloride 103   107     103         CO2 23   27     24         Creatinine 0.70   0.64     0.63         eGFR >60  Comment: Estimated GFR calculated using the CKD-EPI creatinine (2021) equation.   >60  Comment: Estimated GFR calculated using the CKD-EPI creatinine (2021) equation.     >60  Comment: Estimated GFR calculated using the CKD-EPI creatinine (2021) equation.         Eos # 0.00   0.21     0.20         Eos % 0.0   2.7     1.9         Globulin, Total 3.0   2.8     3.5         Glucose 162   109     92         Group & Rh       A POS         Hematocrit 34.1   35.9     37.7         Hemoglobin 10.3   10.9     11.8         Immature Grans (Abs) 0.06   0.03     0.03         Immature Granulocytes 0.4   0.4     0.3         Indirect Luis GEL       NEG         INR     1.1           Lymph # 0.60   1.58     1.35         LYMPH % 3.9   20.1     12.7         Magnesium  1.80   1.80             MCH 25.6    25.5     25.5         MCHC 30.2   30.4     31.3         MCV 84.6   84.1     81.4         Mono # 0.63   0.75     0.92         Mono % 4.1   9.6     8.6         MPV 9.7   9.6     9.8         Neut # 14.04   5.17     8.01         Neut % 91.3   65.8     75.2         nRBC 0.0   0.0     0.0         QRS Duration         78       [facility] QTC Calculation         473       Platelet Count 358   335     369         Potassium 4.0   3.9     2.6  Comment: Critical Result called and verified by verbal readback to: elizabeth hendricks on 02/21/2025 at 15:01. Reported by [id number].         PROTEIN TOTAL 5.7   5.4     6.4         PT     14.7           RBC 4.03   4.27     4.63         RDW 18.7   19.0     18.8         Sodium 136   140     138         Specimen Outdate       02/24/2025 23:59         WBC 15.37   7.85     10.65                                   Assessment/Plan:     Active Diagnoses:    Diagnosis Date Noted POA    Severe malnutrition [E43] 02/24/2025 Yes      Problems Resolved During this Admission:     65 YO F   POD #2 Left hip hemiarthroplasty     Weightbear as tolerated left lower extremity   Posterior hip precautions; abduction  pillow in bed  Complete perioperative antibiotics   DVT prophylaxis-Lovenox  PT OT- recs for placement   Follow up  in 4 weeks with radiographs of the left hip    POC DW Dr Veronica Garcia FNP-C

## 2025-02-24 NOTE — PLAN OF CARE
02/24/25 1158   Discharge Assessment   Assessment Type Discharge Planning Assessment   Confirmed/corrected address, phone number and insurance Yes   Confirmed Demographics Correct on Facesheet   Source of Information patient   When was your last doctors appointment? 02/03/25   Communicated SYMONE with patient/caregiver Date not available/Unable to determine   Reason For Admission Hip fracture   People in Home spouse   Do you expect to return to your current living situation? Yes   Do you have help at home or someone to help you manage your care at home? Yes   Who are your caregiver(s) and their phone number(s)? Emmett Ring [phone]   Prior to hospitilization cognitive status: Alert/Oriented   Current cognitive status: Alert/Oriented   Walking or Climbing Stairs Difficulty yes   Walking or Climbing Stairs ambulation difficulty, requires equipment;ambulation difficulty, assistance 1 person   Mobility Management rollator, WC   Dressing/Bathing Difficulty yes   Dressing/Bathing bathing difficulty, requires equipment   Dressing/Bathing Management shower chair   Home Accessibility stairs to enter home   Number of Stairs, Main Entrance eight   Home Layout Able to live on 1st floor   Equipment Currently Used at Home shower chair;rollator;wheelchair;oxygen;bedside commode   Patient currently being followed by outpatient case management? No   Do you currently have service(s) that help you manage your care at home? No   Do you take prescription medications? Yes   Do you have prescription coverage? Yes   Do you have any problems affording any of your prescribed medications? No   Is the patient taking medications as prescribed? yes   Who is going to help you get home at discharge? Emmett Ring   How do you get to doctors appointments? family or friend will provide   Are you on dialysis? No   Do you take coumadin? No   Discharge Plan A Rehab   Discharge Plan B Rehab   DME Needed Upon Discharge  other (see comments)  (TBD)  "  Discharge Plan discussed with: Patient   Transition of Care Barriers Mobility   OTHER   Name(s) of People in Home Emmett Ring     Pt lives with her spouse in a home that is approximately 6" off the ground. Pt said she has   8 steps then a "landing" and then a ramp from there to go into the home. Pt said they tried to make a ramp the entire way but it was too steep. She has 2 adult children. One in [place] and one in [place]. Pt uses oxygen 2L/NC at home but cannot remember the name of the company that delivers it to her. No HH currently but used [facility] in the past. Consult for HH noted but therapy is recommending inpatient rehab at this time. Freedom of choice signed. Referral sent to [facility] via [employer].   "

## 2025-02-24 NOTE — CONSULTS
Inpatient Nutrition Assessment    Admit Date: 2/21/2025   Total duration of encounter: 3 days   Patient Age: 64 y.o.    Nutrition Recommendation/Prescription     -Continue Regular Diet as tolerated.   -Strawberry Boost [facility] TID for additional nourishment; provides 530 kcal and 22 gm protein per container.   -Consider an appetite stimulant as medically feasible.   -Monitor wt, labs, and intake.     Communication of Recommendations: reviewed with patient    Nutrition Assessment     Malnutrition Assessment/Nutrition-Focused Physical Exam    Malnutrition Context: chronic illness (02/24/25 1017)  Malnutrition Level: severe (02/24/25 1017)  Energy Intake (Malnutrition): less than or equal to 75% for greater than or equal to 1 month (02/24/25 1017)  Weight Loss (Malnutrition): other (see comments) (Does not meet criteria) (02/24/25 1017)  Subcutaneous Fat (Malnutrition): severe depletion (02/24/25 1017)  Orbital Region (Subcutaneous Fat Loss): severe depletion  Upper Arm Region (Subcutaneous Fat Loss): severe depletion     Muscle Mass (Malnutrition): severe depletion (02/24/25 1017)  [place] Region (Muscle Loss): severe depletion  Clavicle Bone Region (Muscle Loss): severe depletion        Dorsal Hand (Muscle Loss): severe depletion           Fluid Accumulation (Malnutrition): other (see comments) (Not present) (02/24/25 1017)        A minimum of two characteristics is recommended for diagnosis of either severe or non-severe malnutrition.    Chart Review    Reason Seen: malnutrition screening tool (MST)    Malnutrition Screening Tool Results   Have you recently lost weight without trying?: No  Have you been eating poorly because of a decreased appetite?: No   MST Score: 0   Diagnosis:  1.-fall from bed   2.-left femoral neck fracture with shortening  -status post left hip hemiarthroplasty 02/22/2025  - will add percocet 10/325 mgs po q 6 hours for pain / morphine 2 mgs for breakthrough   3-hypokalemia-replaced    Relevant  Medical History: Anxiety, Cataract (7/26/2023), Closed nondisplaced pilon fracture of right tibia with routine healing, COPD (chronic obstructive pulmonary disease), Depression, Draining cutaneous sinus tract (8/2/2022), Emphysema lung, History of lung cancer (10/4/2022), Lung cancer (2017), Maxillary sinusitis (12/15/2022), Osteomyelitis of right ankle (7/26/2023), and Primary malignant neoplasm of lung (7/26/2023)     Scheduled Medications:  cetirizine, 10 mg, Daily  enoxparin, 40 mg, Q24H (prophylaxis, 1700)  famotidine, 40 mg, Daily  ferrous sulfate, 1 tablet, Daily with breakfast  fluticasone furoate-vilanteroL, 1 puff, Daily  oxybutynin, 5 mg, TID  tiotropium bromide, 2 puff, Daily    Continuous Infusions:   PRN Medications:  albuterol, 2 puff, Q6H PRN  albuterol-ipratropium, 3 mL, TID PRN  ALPRAZolam, 0.5 mg, TID PRN  morphine, 2 mg, Q4H PRN  ondansetron, 4 mg, Q4H PRN  oxyCODONE-acetaminophen, 1 tablet, Q6H PRN    Calorie Containing IV Medications: no significant kcals from medications at this time    Recent Labs   Lab 02/21/25  1413 02/22/25  0446 02/23/25  0433    140 136   K 2.6* 3.9 4.0   CALCIUM 8.9 8.7 8.5   MG  --  1.80 1.80    107 103   CO2 24 27 23   BUN 7.2* 6.8* 6.4*   CREATININE 0.63 0.64 0.70   EGFRNORACEVR >60 >60 >60   GLUCOSE 92 109 162*   BILITOT 0.4 0.3 0.3   ALKPHOS 78 79 83   ALT 14 12 15   AST 28 22 26   ALBUMIN 2.9* 2.6* 2.7*   WBC 10.65 7.85 15.37*   HGB 11.8* 10.9* 10.3*   HCT 37.7 35.9* 34.1*     Nutrition Orders:  Diet Adult Regular Standard Tray      Appetite/Oral Intake: poor/0-25% of meals  Factors Affecting Nutritional Intake: decreased appetite  Social Needs Impacting Access to Food: none identified  Food/Jehovah's witness/Cultural Preferences: none reported  Food Allergies: no known food allergies  Last Bowel Movement: 02/20/25  Wound(s):  surgical incision     Comments    2/24/25: Pt reports decreased appetite/intake for the past year with wt loss; pt denies n/v; pt  "receptive to oral supplement with meals.     Anthropometrics    Height: 5' 5.98" (167.6 cm),    Last Weight: 52.2 kg (115 lb 1.3 oz) (25 1013), Weight Method: Standard Scale  BMI (Calculated): 18.6  BMI Classification: normal (BMI 18.5-24.9)     Ideal Body Weight (IBW), Female: 129.9 lb     % Ideal Body Weight, Female (lb): 88.59 %                    Usual Body Weight (UBW), k.36 kg  % Usual Body Weight: 85.25  % Weight Change From Usual Weight: -14.93 %  Usual Weight Provided By: patient    Wt Readings from Last 5 Encounters:   25 52.2 kg (115 lb 1.3 oz)   25 49 kg (108 lb)   24 53.5 kg (118 lb)   23 52.6 kg (116 lb)   23 51.7 kg (114 lb)     Weight Change(s) Since Admission:   Wt Readings from Last 1 Encounters:   25 1013 52.2 kg (115 lb 1.3 oz)   25 1728 52.2 kg (115 lb 1.3 oz)   25 1249 52.2 kg (115 lb)   Admit Weight: 52.2 kg (115 lb) (25 1249), Weight Method: Standard Scale    Estimated Needs    Weight Used For Calorie Calculations: 52.2 kg (115 lb 1.3 oz)  Energy Calorie Requirements (kcal): 1140-2248 kcal (30-35 kcal/kg)  Energy Need Method: Kcal/kg  Weight Used For Protein Calculations: 52.2 kg (115 lb 1.3 oz)  Protein Requirements: 78 gm (1.5g/kg)  Fluid Requirements (mL): 1566 mL        Enteral Nutrition     Patient not receiving enteral nutrition at this time.    Parenteral Nutrition     Patient not receiving parenteral nutrition support at this time.    Evaluation of Received Nutrient Intake    Calories: not meeting estimated needs  Protein: not meeting estimated needs    Patient Education     Not applicable.    Nutrition Diagnosis     PES: Inadequate oral intake related to chronic illness as evidenced by pt report of poor appetite/intake x 1 year. (new)     PES: Severe chronic disease or condition related malnutrition Related to chronic illness, suboptimal po intake As Evidenced by:  - energy intake: <= 75% for 12 months (meets criteria " for <= 75% >= 1 month (severe - chronic)) - muscle mass depletion: 5 areas of severe muscle loss (Clavicle, Pectoralis, Temporalis, Interosseous, Trapezius) - loss of subcutaneous fat: 3 areas of severe fat loss (Triceps Skinfold, Infraorbital, Buccal) new    Nutrition Interventions     Intervention(s): general/healthful diet, commercial beverage, prescription medication, and collaboration with other providers  Intervention(s): Oral nutritional supplement    Goal: Meet greater than 80% of nutritional needs by follow-up. (new)  Goal: Maintain weight throughout hospitalization. (new)    Nutrition Goals & Monitoring     Dietitian will monitor: energy intake and weight  Discharge planning: continue Regular diet with Boost Plus oral supplements  Nutrition Risk/Follow-Up: high (follow-up in 1-4 days)   Please consult if re-assessment needed sooner.

## 2025-02-24 NOTE — PROGRESS NOTES
[facility]  Hospital Medicine Progress Note        Chief Complaint: Inpatient Follow-up for Hip Pain (C/o L hip pain x 2 weeks. States fall x 2 weeks ago and pain since. Denies any new recent trauma injury. GCS 15 )    HPI: Lauren Ring is a 64 y.o. 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 The patient presented to [facility] on 2/21/2025 with a primary complaint of pain to left hip after falling from bed around 2 weeks ago.  Patient was seen at emergency room [facility] and diagnosed with displaced left femoral neck fracture with a shortening.  Patient has been seen by Orthopedics with plans for left hip arthroplasty tomorrow.  Additional history includes hepatitis-C treated as well as lung cancer status post lobectomy.  Patient is supposed to be on O2 but not currently.  We will go ahead and place her on tele.  Patient voices no complaints.  No distress and pain appears to be controlled.  Physical examination her left leg is shortened and laterally displaced.    Interval Hx:     02/22/2025 Dr. Steinberg-patient is status post left hip hemiarthroplasty without complications.  She remains alert/active and oriented and requesting food.      2/23/25 dr yan yousif /  stable . Looks great . Wants better pain control     Case was discussed with patient's nurse and  on the floor.    Objective/physical exam:  General: In no acute distress, afebrile  Chest:decrease breath sounds right lung field  Heart: RRR, +S1, S2, no appreciable murmur  Abdomen: Soft,  nontender, BS +  MSK: Warm, no lower extremity edema, no clubbing or cyanosis  Neurologic: Alert and oriented x4, Cranial nerve II-XII intact, Strength 5/5 in all 4 extremities    VITAL SIGNS: 24 HRS MIN & MAX LAST   Temp  Min: 98 °F (36.7 °C)  Max: 99.3 °F (37.4 °C) 98.7 °F (37.1 °C)   BP  Min: 91/61  Max: 121/76 104/70   Pulse  Min: 87  Max: 117  (!) 117   Resp  Min: 16  Max: 20 18   SpO2  Min: 91 %  Max: 95 % 95 %     I have reviewed the following labs:  Recent Labs   Lab 02/21/25  1413 02/22/25  0446 02/23/25  0433   WBC 10.65 7.85 15.37*   RBC 4.63 4.27 4.03*   HGB 11.8* 10.9* 10.3*   HCT 37.7 35.9* 34.1*   MCV 81.4 84.1 84.6   MCH 25.5* 25.5* 25.6*   MCHC 31.3* 30.4* 30.2*   RDW 18.8* 19.0* 18.7*    335 358   MPV 9.8 9.6 9.7     Recent Labs   Lab 02/21/25  1413 02/22/25  0446 02/23/25  0433    140 136   K 2.6* 3.9 4.0    107 103   CO2 24 27 23   BUN 7.2* 6.8* 6.4*   CREATININE 0.63 0.64 0.70   CALCIUM 8.9 8.7 8.5   MG  --  1.80 1.80   ALBUMIN 2.9* 2.6* 2.7*   ALKPHOS 78 79 83   ALT 14 12 15   AST 28 22 26   BILITOT 0.4 0.3 0.3     Microbiology Results (last 7 days)       ** No results found for the last 168 hours. **             See below for Radiology    Assessment/Plan:  1.-fall from bed   2.-left femoral neck fracture with shortening  -status post left hip hemiarthroplasty 02/22/2025  - will add percocet 10/325 mgs po q 6 hours for pain / morphine 2 mgs for breakthrough   3-hypokalemia-replaced     History of COPD/emphysema/lung cancer/lobectomy/hepatitis-C treated/anxiety/depression        PT/OT/ for discharge planning    VTE prophylaxis:     Patient condition:  Stable    Anticipated discharge and Disposition:   tbd      All diagnosis and differential diagnosis have been reviewed; assessment and plan has been documented; I have personally reviewed the labs and test results that are presently available; I have reviewed the patients medication list; I have reviewed the  consulting providers response and recommendations. I have reviewed or attempted to review medical records based upon their availability    All of the patient's questions have been  addressed and answered. Patient's is agreeable to the above stated plan. I will continue to monitor closely and make adjustments to medical management as needed.    Portions of this note dictated using EMR integrated voice recognition software, and may be subject to voice recognition errors not corrected at proofreading. Please contact writer for clarification if needed.   _____________________________________________________________________    Malnutrition Status:  Nutrition consulted. Most recent weight and BMI monitored-     Measurements:  Wt Readings from Last 1 Encounters:   02/21/25 52.2 kg (115 lb 1.3 oz)   Body mass index is 18.57 kg/m².    Patient has been screened and assessed by RD.    Malnutrition Type:  Context:    Level:      Malnutrition Characteristic Summary:       Interventions/Recommendations (treatment strategy):        Scheduled Med:   cetirizine  10 mg Oral Daily    famotidine  40 mg Oral Daily    ferrous sulfate  1 tablet Oral Daily with breakfast    fluticasone furoate-vilanteroL  1 puff Inhalation Daily    oxybutynin  5 mg Oral TID    tiotropium bromide  2 puff Inhalation Daily      Continuous Infusions:     PRN Meds:    Current Facility-Administered Medications:     albuterol, 2 puff, Inhalation, Q6H PRN    albuterol-ipratropium, 3 mL, Nebulization, TID PRN    ALPRAZolam, 0.5 mg, Oral, TID PRN    morphine, 2 mg, Intravenous, Q4H PRN    ondansetron, 4 mg, Intravenous, Q4H PRN     Radiology:  I have personally reviewed the following imaging and agree with the radiologist.     X-Ray Hip 1 View Left (with Pelvis when performed)  EXAMINATION  XR HIP 1 VIEW LEFT (WITH PELVIS WHEN PERFORMED)    CLINICAL HISTORY  f/u fracture;    TECHNIQUE  A total of 1 image(s) submitted of the left hip.    COMPARISON  21 February  2025    FINDINGS  Postoperative changes of left hip arthroplasty are present. There is no evidence of immediate hardware abnormality or loosening. No concerning osseous finding is appreciated.    Acute postsurgical soft tissue changes are noted. No retained instrument or other radiopaque foreign body is identified.    IMPRESSION  Changes of left hip arthroplasty, with no unexpected postoperative findings.    Electronically signed by: Lit Land  Date:    02/23/2025  Time:    14:57      Agustín Steinberg MD  Department of Hospital Medicine   [facility]   02/23/2025

## 2025-02-24 NOTE — PT/OT/SLP PROGRESS
Occupational Therapy   Treatment    Name: Lauren Ring  MRN: [id number]    Recommendations:     Recommended therapy intensity at discharge: High Intensity Therapy   Discharge Equipment Recommendations:  to be determined by next level of care  Barriers to discharge:       Assessment:     Lauren Ring is a 64 y.o. female with a medical diagnosis of L displaced femoral neck fx following a fall 2 weeks ago; now s/p L hip hemiarthroplasty on 2/22. Performance deficits affecting function are weakness, impaired endurance, impaired self care skills, impaired functional mobility, gait instability, impaired balance, decreased safety awareness, decreased lower extremity function, decreased upper extremity function, orthopedic precautions. Pt tolerated session well. Recommending high intensity therapy at d/c.     Rehab Prognosis:  Good; patient would benefit from acute skilled OT services to address these deficits and reach maximum level of function.       Plan:     Patient to be seen 6 x/week to address the above listed problems via self-care/home management, therapeutic activities, therapeutic exercises  Plan of Care Expires: 03/23/25  Plan of Care Reviewed with: patient    Subjective     Pain/Comfort:  Location - Side 1: Left  Location 1: hip  Pain Addressed 1: Reposition, Distraction    Objective:     Communicated with: RN prior to session.  Patient found HOB elevated with PureWick, hip abduction pillow, peripheral IV upon OT entry to room.    General Precautions: Standard, fall    Orthopedic Precautions:LLE posterior precautions, LLE weight bearing as tolerated  Braces: N/A  Respiratory Status: Nasal cannula, flow 2 L/min     Occupational Performance:     Bed Mobility:    Patient completed Scooting/Bridging with minimum assistance  Patient completed Supine to Sit with minimum assistance     Functional Mobility/Transfers:  Patient completed Sit <> Stand Transfer with minimum assistance  with  rolling walker and x3 trials    Patient completed Bed <> Chair Transfer using Step Transfer technique with minimum assistance with rolling walker  Patient completed Toilet Transfer Step Transfer technique with minimum assistance with  rolling walker and to BSC.   Functional Mobility: pain with Wb'ing onto LLE. Noted with posterior lean requiring cues for upright posture.     Therapeutic Positioning    OT interventions performed during the course of today's session in an effort to prevent and/or reduce acquired pressure injuries:   Education was provided on benefits of and recommendations for therapeutic positioning    [facility] 6 Click ADL: 18    Patient Education:  Patient provided with verbal education education regarding OT role/goals/POC, fall prevention, and safety awareness.  Understanding was verbalized.      Patient left up in chair with all lines intact and call button in reach.    GOALS:   Multidisciplinary Problems       Occupational Therapy Goals          Problem: Occupational Therapy    Goal Priority Disciplines Outcome Interventions   Occupational Therapy Goal     OT, PT/OT Progressing    Description: LTG: Pt will perform basic ADLs and ADL transfers with Modified independence using LRAD by discharge.    STG: to be met by 3/23/25    Pt will complete grooming standing at sink with LRAD with SBA.  Pt will complete UB dressing with SBA.  Pt will complete LB dressing with SBA using LRAD and AE prn.  Pt will complete toileting with SBA using LRAD.  Pt will complete functional mobility to/from toilet and toilet transfer with SBA using LRAD.                        Time Tracking:     OT Date of Treatment: 02/24/25  OT Start Time: 0925  OT Stop Time: 0950  OT Total Time (min): 25 min    Billable Minutes:Self Care/Home Management 25    OT/KIMBERLEY: KIMBERLEY     Number of KIMBERLEY visits since last OT visit: 1    2/24/2025

## 2025-02-24 NOTE — PLAN OF CARE
02/24/25 0801   Medicare Message   Important Message from Medicare regarding Discharge Appeal Rights Given to patient/caregiver;Explained to patient/caregiver

## 2025-02-24 NOTE — PT/OT/SLP PROGRESS
Physical Therapy Treatment    Patient Name:  Lauren Ring   MRN:  [id number]    Recommendations:     Discharge therapy intensity: High Intensity Therapy   Discharge Equipment Recommendations:  (TBD by next level of care)  Barriers to discharge: None    Assessment:     Lauren Ring is a 64 y.o. female admitted with a medical diagnosis of L displaced femoral neck fx following a fall 2 weeks ago; now s/p L hip hemiarthroplasty on 2/22.  She presents with the following impairments/functional limitations: weakness, impaired endurance, impaired self care skills, impaired functional mobility, gait instability, impaired balance, decreased safety awareness, pain.    Rehab Prognosis: Good; patient would benefit from acute skilled PT services to address these deficits and reach maximum level of function.    Recent Surgery: Procedure(s) (LRB):  HEMIARTHROPLASTY, HIP, POSTERIOR APPROACH (Left) 2 Days Post-Op    Plan:     During this hospitalization, patient would benefit from acute PT services 6 x/week to address the identified rehab impairments via gait training, therapeutic activities, therapeutic exercises, neuromuscular re-education and progress toward the following goals:    Plan of Care Expires:  03/23/25    Subjective     Chief Complaint: pain  Patient/Family Comments/goals: none  Pain/Comfort:  Pain Rating 1: 9/10  Location - Side 1: Left  Location 1: hip  Pain Addressed 1: Reposition, Distraction  Pain Rating Post-Intervention 1: 9/10      Objective:     Communicated with nurse prior to session.  Patient found supine with PureWick upon PT entry to room.     General Precautions: Standard, fall  Orthopedic Precautions: LLE posterior precautions, LLE weight bearing as tolerated  Braces: N/A  Respiratory Status: Room air  Skin Integrity: Visible skin intact      Functional Mobility:  Bed Mobility:  Sit to Supine: minimum assistance  Transfers:  Sit to Stand:  minimum assistance with rolling walker  Gait: 25 ft x 2 with RW &  CGA. Antalgic gait pattern  Balance: fair    Therapeutic Exercises:  Patient performed seated Marches, Heel Raises, LAQ, Glute Sets, Resisted Hip ABD/ADD. All performed 2 x 20 reps.    Education Provided:  Role and goals of PT, transfer training, bed mobility, gait training, balance training, safety awareness, assistive device, strengthening exercises, and importance of participating in PT to return to PLOF.    Patient left supine with all lines intact, call button in reach, and bed alarm on    GOALS:   Multidisciplinary Problems       Physical Therapy Goals          Problem: Physical Therapy    Goal Priority Disciplines Outcome Interventions   Physical Therapy Goal     PT, PT/OT Progressing    Description: Goals to be met by: 3/23/25     Patient will increase functional independence with mobility by performin. Supine to sit with Set-up [place]  2. Sit to supine with Set-up [place]  3. Sit to stand transfer with Supervision  4. Bed to chair transfer with Supervision using Rolling Walker  5. Gait  x 200 feet with Supervision using Rolling Walker.                            Time Tracking:     PT Received On: 25  PT Start Time: 1122     PT Stop Time: 1145  PT Total Time (min): 23 min     Billable Minutes: Therapeutic Activity 13 minutes and Therapeutic Exercise 10 minutes    Treatment Type: Treatment  PT/PTA: PT     Number of PTA visits since last PT visit: 2025

## 2025-02-25 LAB
ALBUMIN SERPL-MCNC: 2.2 G/DL (ref 3.4–4.8)
ALBUMIN/GLOB SERPL: 0.8 RATIO (ref 1.1–2)
ALP SERPL-CCNC: 71 UNIT/L (ref 40–150)
ALT SERPL-CCNC: 12 UNIT/L (ref 0–55)
ANION GAP SERPL CALC-SCNC: 8 MEQ/L
AST SERPL-CCNC: 23 UNIT/L (ref 5–34)
BASOPHILS # BLD AUTO: 0.06 X10(3)/MCL
BASOPHILS NFR BLD AUTO: 0.7 %
BILIRUB SERPL-MCNC: 0.3 MG/DL
BUN SERPL-MCNC: 6.3 MG/DL (ref 9.8–20.1)
CALCIUM SERPL-MCNC: 8.5 MG/DL (ref 8.4–10.2)
CHLORIDE SERPL-SCNC: 105 MMOL/L (ref 98–107)
CO2 SERPL-SCNC: 27 MMOL/L (ref 23–31)
CREAT SERPL-MCNC: 0.6 MG/DL (ref 0.55–1.02)
CREAT/UREA NIT SERPL: 11
EOSINOPHIL # BLD AUTO: 0.12 X10(3)/MCL (ref 0–0.9)
EOSINOPHIL NFR BLD AUTO: 1.3 %
ERYTHROCYTE [DISTWIDTH] IN BLOOD BY AUTOMATED COUNT: 19.2 % (ref 11.5–17)
GFR SERPLBLD CREATININE-BSD FMLA CKD-EPI: >60 ML/MIN/1.73/M2
GLOBULIN SER-MCNC: 2.8 GM/DL (ref 2.4–3.5)
GLUCOSE SERPL-MCNC: 111 MG/DL (ref 82–115)
HCT VFR BLD AUTO: 26.4 % (ref 37–47)
HGB BLD-MCNC: 8.2 G/DL (ref 12–16)
IMM GRANULOCYTES # BLD AUTO: 0.07 X10(3)/MCL (ref 0–0.04)
IMM GRANULOCYTES NFR BLD AUTO: 0.8 %
LYMPHOCYTES # BLD AUTO: 1.86 X10(3)/MCL (ref 0.6–4.6)
LYMPHOCYTES NFR BLD AUTO: 20.3 %
MAGNESIUM SERPL-MCNC: 1.7 MG/DL (ref 1.6–2.6)
MCH RBC QN AUTO: 26.1 PG (ref 27–31)
MCHC RBC AUTO-ENTMCNC: 31.1 G/DL (ref 33–36)
MCV RBC AUTO: 84.1 FL (ref 80–94)
MONOCYTES # BLD AUTO: 1.52 X10(3)/MCL (ref 0.1–1.3)
MONOCYTES NFR BLD AUTO: 16.6 %
NEUTROPHILS # BLD AUTO: 5.55 X10(3)/MCL (ref 2.1–9.2)
NEUTROPHILS NFR BLD AUTO: 60.3 %
NRBC BLD AUTO-RTO: 0 %
PLATELET # BLD AUTO: 311 X10(3)/MCL (ref 130–400)
PMV BLD AUTO: 9.8 FL (ref 7.4–10.4)
POTASSIUM SERPL-SCNC: 4.2 MMOL/L (ref 3.5–5.1)
PROT SERPL-MCNC: 5 GM/DL (ref 5.8–7.6)
RBC # BLD AUTO: 3.14 X10(6)/MCL (ref 4.2–5.4)
SODIUM SERPL-SCNC: 140 MMOL/L (ref 136–145)
WBC # BLD AUTO: 9.18 X10(3)/MCL (ref 4.5–11.5)

## 2025-02-25 PROCEDURE — 97116 GAIT TRAINING THERAPY: CPT | Mod: CQ

## 2025-02-25 PROCEDURE — 80053 COMPREHEN METABOLIC PANEL: CPT | Performed by: INTERNAL MEDICINE

## 2025-02-25 PROCEDURE — 63600175 PHARM REV CODE 636 W HCPCS: Performed by: INTERNAL MEDICINE

## 2025-02-25 PROCEDURE — 36415 COLL VENOUS BLD VENIPUNCTURE: CPT | Performed by: INTERNAL MEDICINE

## 2025-02-25 PROCEDURE — 83735 ASSAY OF MAGNESIUM: CPT | Performed by: INTERNAL MEDICINE

## 2025-02-25 PROCEDURE — 97530 THERAPEUTIC ACTIVITIES: CPT | Mod: CQ

## 2025-02-25 PROCEDURE — 11000001 HC ACUTE MED/SURG PRIVATE ROOM

## 2025-02-25 PROCEDURE — 25000242 PHARM REV CODE 250 ALT 637 W/ HCPCS: Performed by: INTERNAL MEDICINE

## 2025-02-25 PROCEDURE — 97535 SELF CARE MNGMENT TRAINING: CPT | Mod: CO

## 2025-02-25 PROCEDURE — 85025 COMPLETE CBC W/AUTO DIFF WBC: CPT | Performed by: INTERNAL MEDICINE

## 2025-02-25 PROCEDURE — 25000003 PHARM REV CODE 250: Performed by: INTERNAL MEDICINE

## 2025-02-25 RX ADMIN — DIPHENHYDRAMINE HYDROCHLORIDE 25 MG: 25 CAPSULE ORAL at 07:02

## 2025-02-25 RX ADMIN — OXYCODONE AND ACETAMINOPHEN 1 TABLET: 10; 325 TABLET ORAL at 01:02

## 2025-02-25 RX ADMIN — ENOXAPARIN SODIUM 40 MG: 40 INJECTION SUBCUTANEOUS at 05:02

## 2025-02-25 RX ADMIN — FERROUS SULFATE TAB 325 MG (65 MG ELEMENTAL FE) 1 EACH: 325 (65 FE) TAB at 07:02

## 2025-02-25 RX ADMIN — OXYBUTYNIN CHLORIDE 5 MG: 5 TABLET ORAL at 08:02

## 2025-02-25 RX ADMIN — ALPRAZOLAM 0.5 MG: 0.5 TABLET ORAL at 06:02

## 2025-02-25 RX ADMIN — DIPHENHYDRAMINE HYDROCHLORIDE 25 MG: 25 CAPSULE ORAL at 01:02

## 2025-02-25 RX ADMIN — OXYBUTYNIN CHLORIDE 5 MG: 5 TABLET ORAL at 02:02

## 2025-02-25 RX ADMIN — FLUTICASONE FUROATE AND VILANTEROL TRIFENATATE 1 PUFF: 100; 25 POWDER RESPIRATORY (INHALATION) at 09:02

## 2025-02-25 RX ADMIN — OXYCODONE AND ACETAMINOPHEN 1 TABLET: 10; 325 TABLET ORAL at 06:02

## 2025-02-25 RX ADMIN — OXYBUTYNIN CHLORIDE 5 MG: 5 TABLET ORAL at 09:02

## 2025-02-25 RX ADMIN — TIOTROPIUM BROMIDE INHALATION SPRAY 2 PUFF: 3.12 SPRAY, METERED RESPIRATORY (INHALATION) at 05:02

## 2025-02-25 RX ADMIN — ALPRAZOLAM 0.5 MG: 0.5 TABLET ORAL at 08:02

## 2025-02-25 RX ADMIN — FAMOTIDINE 40 MG: 20 TABLET, FILM COATED ORAL at 09:02

## 2025-02-25 RX ADMIN — CETIRIZINE HYDROCHLORIDE 10 MG: 10 TABLET, FILM COATED ORAL at 09:02

## 2025-02-25 NOTE — PT/OT/SLP PROGRESS
Physical Therapy Treatment    Patient Name:  Lauren Ring   MRN:  [id number]    Recommendations:     Discharge therapy intensity: Moderate Intensity Therapy   Discharge Equipment Recommendations: to be determined by next level of care  Barriers to discharge: Inaccessible home, Decreased caregiver support, and Impaired mobility    Assessment:     Lauren Ring is a 64 y.o. female admitted with a medical diagnosis of .  She presents with the following impairments/functional limitations: weakness, impaired endurance, impaired self care skills, impaired functional mobility, gait instability, impaired balance, decreased safety awareness, pain L displaced femoral neck fx following a fall 2 weeks ago; now s/p L hip hemiarthroplasty on 2/22. .    Pt with limited activity tolerance throughout tx session. States she was not walking much before this hospitalization 2/2 chronic pain and is unsure if she can tolerate 3 hours of therapy a day. Pt agreeable to changing d/c rec to moderate intensity therapy at d/c. CM aware.     Rehab Prognosis: Good; patient would benefit from acute skilled PT services to address these deficits and reach maximum level of function.    Recent Surgery: Procedure(s) (LRB):  HEMIARTHROPLASTY, HIP, POSTERIOR APPROACH (Left) 3 Days Post-Op    Plan:     During this hospitalization, patient would benefit from acute PT services 6 x/week to address the identified rehab impairments via gait training, therapeutic activities, therapeutic exercises, neuromuscular re-education and progress toward the following goals:    Plan of Care Expires:  03/23/25    Subjective     Chief Complaint:   Patient/Family Comments/goals:   Pain/Comfort:  Location - Side 1: Left  Location 1: hip  Pain Addressed 1: Reposition, Distraction      Objective:     Communicated with NSG prior to session.  Patient found HOB elevated with PureWick upon PT entry to room.     General Precautions: Standard, fall  Orthopedic Precautions: LLE  posterior precautions, LLE weight bearing as tolerated  Braces: N/A  Respiratory Status: Nasal cannula, flow 2 L/min  Blood Pressure:   Skin Integrity: Visible skin intact      Functional Mobility:  Bed Mobility:     Scooting: stand by assistance  Supine to Sit: minimum assistance  Transfers:     Sit to Stand:  minimum assistance with rolling walker and 1 trial from EOB, 1 trial from toilet, 1 trial from bedside chair   Bed to Chair: minimum assistance with  rolling walker  using  Step Transfer  Toilet Transfer: minimum assistance with  rolling walker  using  Step Transfer  Gait: pt amb 12ft 2x with RW Jose. Pt with unsteady gait pattern throughout and presents with short step-through gait pattern. Required increased time and rest between trials.        Patient left up in chair with all lines intact and call button in reach    GOALS:   Multidisciplinary Problems       Physical Therapy Goals          Problem: Physical Therapy    Goal Priority Disciplines Outcome Interventions   Physical Therapy Goal     PT, PT/OT Progressing    Description: Goals to be met by: 3/23/25     Patient will increase functional independence with mobility by performin. Supine to sit with Set-up [place]  2. Sit to supine with Set-up [place]  3. Sit to stand transfer with Supervision  4. Bed to chair transfer with Supervision using Rolling Walker  5. Gait  x 200 feet with Supervision using Rolling Walker.                            Time Tracking:     PT Received On: 25  PT Start Time: 1104     PT Stop Time: 1132  PT Total Time (min): 28 min     Billable Minutes: Gait Training 18 and Therapeutic Activity 10    Treatment Type: Treatment  PT/PTA: PTA     Number of PTA visits since last PT visit: 2025

## 2025-02-25 NOTE — PROGRESS NOTES
[facility]  Hospital Medicine Progress Note        Chief Complaint: Inpatient Follow-up for Hip Pain (C/o L hip pain x 2 weeks. States fall x 2 weeks ago and pain since. Denies any new recent trauma injury. GCS 15 )    HPI: Lauren Ring is a 64 y.o. 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 The patient presented to [facility] on 2/21/2025 with a primary complaint of pain to left hip after falling from bed around 2 weeks ago.  Patient was seen at emergency room [facility] and diagnosed with displaced left femoral neck fracture with a shortening.  Patient has been seen by Orthopedics with plans for left hip arthroplasty tomorrow.  Additional history includes hepatitis-C treated as well as lung cancer status post lobectomy.  Patient is supposed to be on O2 but not currently.  We will go ahead and place her on tele.  Patient voices no complaints.  No distress and pain appears to be controlled.  Physical examination her left leg is shortened and laterally displaced.    Interval Hx:     02/22/2025 Dr. Steinberg-patient is status post left hip hemiarthroplasty without complications.  She remains alert/active and oriented and requesting food.      2/23/25 dr yan yousif /  stable . Looks great . Wants better pain control   02/24/2025 Dr. Steinberg-chart reviewed patient examined she voices no complaints.  Tolerating physical therapy better.  Pain better controlled.  We will draw labs for tomorrow in  is working on discharge planning/placement    Case was  discussed with patient's nurse and  on the floor.    Objective/physical exam:  General: In no acute distress, afebrile  Chest:decrease breath sounds right lung field  Heart: RRR, +S1, S2, no appreciable murmur  Abdomen: Soft, nontender, BS +  MSK: Warm, no lower extremity edema, no clubbing or cyanosis  Neurologic: Alert and oriented x4, Cranial nerve II-XII intact, Strength 5/5 in all 4 extremities    VITAL SIGNS: 24 HRS MIN & MAX LAST   Temp  Min: 97.7 °F (36.5 °C)  Max: 98.9 °F (37.2 °C) 97.8 °F (36.6 °C)   BP  Min: 89/54  Max: 112/71 112/71   Pulse  Min: 88  Max: 128  (!) 114   Resp  Min: 16  Max: 20 20   SpO2  Min: 94 %  Max: 98 % 95 %     I have reviewed the following labs:  Recent Labs   Lab 02/21/25  1413 02/22/25 0446 02/23/25  0433   WBC 10.65 7.85 15.37*   RBC 4.63 4.27 4.03*   HGB 11.8* 10.9* 10.3*   HCT 37.7 35.9* 34.1*   MCV 81.4 84.1 84.6   MCH 25.5* 25.5* 25.6*   MCHC 31.3* 30.4* 30.2*   RDW 18.8* 19.0* 18.7*    335 358   MPV 9.8 9.6 9.7     Recent Labs   Lab 02/21/25  1413 02/22/25 0446 02/23/25  0433    140 136   K 2.6* 3.9 4.0    107 103   CO2 24 27 23   BUN 7.2* 6.8* 6.4*   CREATININE 0.63 0.64 0.70   CALCIUM 8.9 8.7 8.5   MG  --  1.80 1.80   ALBUMIN 2.9* 2.6* 2.7*   ALKPHOS 78 79 83   ALT 14 12 15   AST 28 22 26   BILITOT 0.4 0.3 0.3     Microbiology Results (last 7 days)       ** No results found for the last 168 hours. **             See below for Radiology    Assessment/Plan:  1.-fall from bed   2.-left femoral neck fracture with shortening  -status post left hip hemiarthroplasty 02/22/2025  - percocet 10/325 mgs po q 6 hours for pain / morphine 2 mgs for breakthrough   3-hypokalemia-replaced     History of COPD/emphysema/lung cancer/lobectomy/hepatitis-C treated/anxiety/depression        PT/OT/ for discharge planning    VTE prophylaxis:     Patient condition:  Stable    Anticipated discharge and Disposition:   tbd      All diagnosis and  differential diagnosis have been reviewed; assessment and plan has been documented; I have personally reviewed the labs and test results that are presently available; I have reviewed the patients medication list; I have reviewed the consulting providers response and recommendations. I have reviewed or attempted to review medical records based upon their availability    All of the patient's questions have been  addressed and answered. Patient's is agreeable to the above stated plan. I will continue to monitor closely and make adjustments to medical management as needed.    Portions of this note dictated using EMR integrated voice recognition software, and may be subject to voice recognition errors not corrected at proofreading. Please contact writer for clarification if needed.   _____________________________________________________________________    Malnutrition Status:  Nutrition consulted. Most recent weight and BMI monitored-     Measurements:  Wt Readings from Last 1 Encounters:   02/24/25 52.2 kg (115 lb 1.3 oz)   Body mass index is 18.58 kg/m².    Patient has been screened and assessed by RD.    Malnutrition Type:  Context: chronic illness  Level: severe    Malnutrition Characteristic Summary:  Weight Loss (Malnutrition): other (see comments) (Does not meet criteria)  Energy Intake (Malnutrition): less than or equal to 75% for greater than or equal to 1 month  Subcutaneous Fat (Malnutrition): severe depletion  Muscle Mass (Malnutrition): severe depletion  Fluid Accumulation (Malnutrition): other (see comments) (Not present)    Interventions/Recommendations (treatment strategy):  Oral nutritional supplement     Scheduled Med:   cetirizine  10 mg Oral Daily    enoxparin  40 mg Subcutaneous Q24H (prophylaxis, 1700)    famotidine  40 mg Oral Daily    ferrous sulfate  1 tablet Oral Daily with breakfast    fluticasone furoate-vilanteroL  1 puff Inhalation Daily    oxybutynin  5 mg Oral TID    tiotropium bromide  2  puff Inhalation Daily      Continuous Infusions:     PRN Meds:    Current Facility-Administered Medications:     albuterol, 2 puff, Inhalation, Q6H PRN    albuterol-ipratropium, 3 mL, Nebulization, TID PRN    ALPRAZolam, 0.5 mg, Oral, TID PRN    morphine, 2 mg, Intravenous, Q4H PRN    ondansetron, 4 mg, Intravenous, Q4H PRN    oxyCODONE-acetaminophen, 1 tablet, Oral, Q6H PRN     Radiology:  I have personally reviewed the following imaging and agree with the radiologist.     X-Ray Hip 1 View Left (with Pelvis when performed)  EXAMINATION  XR HIP 1 VIEW LEFT (WITH PELVIS WHEN PERFORMED)    CLINICAL HISTORY  f/u fracture;    TECHNIQUE  A total of 1 image(s) submitted of the left hip.    COMPARISON  21 February 2025    FINDINGS  Postoperative changes of left hip arthroplasty are present. There is no evidence of immediate hardware abnormality or loosening. No concerning osseous finding is appreciated.    Acute postsurgical soft tissue changes are noted. No retained instrument or other radiopaque foreign body is identified.    IMPRESSION  Changes of left hip arthroplasty, with no unexpected postoperative findings.    Electronically signed by: Lit Land  Date:    02/23/2025  Time:    14:57      Agustín Steinberg MD  Department of Hospital Medicine   [facility]   02/24/2025

## 2025-02-25 NOTE — PLAN OF CARE
[facility] sent referral/clinicals via [facility] Fax to:    [facility]

## 2025-02-25 NOTE — PROGRESS NOTES
".[facility] - 8th Floor Med Surg  Orthopedics  Progress Note    Patient Name: Lauren Ring  MRN: [id number]  Admission Date: 2/21/2025  Hospital Length of Stay: 4 days  Attending Provider: Agustín Steinberg MD  Primary Care Provider: Jose Juan Dunn MD  Follow-up For: Procedure(s) (LRB):  HEMIARTHROPLASTY, HIP, POSTERIOR APPROACH (Left)      Subjective:     Principal Problem:<principal problem not specified>    Principal Orthopedic Problem: 3 Days Post-Op     Interval History:  Seen this am. Pain ok, still sore. Anticipate placement.     Review of patient's allergies indicates:   Allergen Reactions    Codeine Itching    Influenza virus vaccines Other (See Comments)     Adverse Reaction    Tetanus toxoid        Current Facility-Administered Medications   Medication    albuterol inhaler 2 puff    albuterol-ipratropium 2.5 mg-0.5 mg/3 mL nebulizer solution 3 mL    ALPRAZolam tablet 0.5 mg    cetirizine tablet 10 mg    diphenhydrAMINE capsule 25 mg    enoxaparin injection 40 mg    famotidine tablet 40 mg    ferrous sulfate tablet 1 each    fluticasone furoate-vilanteroL 100-25 mcg/dose diskus inhaler 1 puff    morphine injection 2 mg    ondansetron injection 4 mg    oxybutynin tablet 5 mg    oxyCODONE-acetaminophen  mg per tablet 1 tablet    tiotropium bromide 2.5 mcg/actuation inhaler 2 puff     Objective:     Vital Signs (Most Recent):  Temp: 98.5 °F (36.9 °C) (02/25/25 0730)  Pulse: 101 (02/25/25 0900)  Resp: 20 (02/25/25 0900)  BP: 103/69 (02/25/25 0730)  SpO2: 95 % (02/25/25 0900) Vital Signs (24h Range):  Temp:  [97.8 °F (36.6 °C)-99.6 °F (37.6 °C)] 98.5 °F (36.9 °C)  Pulse:  [] 101  Resp:  [16-20] 20  SpO2:  [95 %-98 %] 95 %  BP: (100-112)/(64-71) 103/69     Weight: 52.2 kg (115 lb 1.3 oz)  Height: 5' 5.98" (167.6 cm)  Body mass index is 18.58 kg/m².      Intake/Output Summary (Last 24 hours) at 2/25/2025 0999  Last data filed at 2/25/2025 0634  Gross per 24 hour   Intake 1632 ml   Output " 876 ml   Net 756 ml         Physical Exam:   Musculoskeletal:   Left lower extremity:  closed subq with steristrips; dorsi/plantar flexes the foot; SILT distally; BCR distally; DP pulse 2+      Diagnostic Findings:   Significant Labs:   Recent Lab Results         02/25/25  0452   02/23/25  0433        Albumin/Globulin Ratio 0.8   0.9       Albumin 2.2   2.7       ALP 71   83       ALT 12   15       Anion Gap 8.0   10.0       AST 23   26       Baso # 0.06   0.04       Basophil % 0.7   0.3       BILIRUBIN TOTAL 0.3   0.3       BUN 6.3   6.4       BUN/CREAT RATIO 11   9       Calcium 8.5   8.5       Chloride 105   103       CO2 27   23       Creatinine 0.60   0.70       eGFR >60  Comment: Estimated GFR calculated using the CKD-EPI creatinine (2021) equation.   >60  Comment: Estimated GFR calculated using the CKD-EPI creatinine (2021) equation.       Eos # 0.12   0.00       Eos % 1.3   0.0       Globulin, Total 2.8   3.0       Glucose 111   162       Hematocrit 26.4   34.1       Hemoglobin 8.2   10.3       Immature Grans (Abs) 0.07   0.06       Immature Granulocytes 0.8   0.4       Lymph # 1.86   0.60       LYMPH % 20.3   3.9       Magnesium  1.70   1.80       MCH 26.1   25.6       MCHC 31.1   30.2       MCV 84.1   84.6       Mono # 1.52   0.63       Mono % 16.6   4.1       MPV 9.8   9.7       Neut # 5.55   14.04       Neut % 60.3   91.3       nRBC 0.0   0.0       Platelet Count 311   358       Potassium 4.2   4.0       PROTEIN TOTAL 5.0   5.7       RBC 3.14   4.03       RDW 19.2   18.7       Sodium 140   136       WBC 9.18   15.37                  Assessment/Plan:     Active Diagnoses:    Diagnosis Date Noted POA    Severe malnutrition [E43] 02/24/2025 Yes      Problems Resolved During this Admission:     65 YO F   POD #3 Left hip hemiarthroplasty     Weightbear as tolerated left lower extremity   Posterior hip precautions; abduction  pillow in bed  Complete perioperative antibiotics   DVT prophylaxis-Lovenox  PT OT-  recs for placement   Follow up  in 4 weeks with radiographs of the left hip    POC ANTHONY Garcia FNP-C

## 2025-02-25 NOTE — PLAN OF CARE
Insurance is requesting P2P for inpt rehab. MD notified. In the meantime, therapy changed recs to SNF level. MD notified. Belen at [facility] notified. New patient list printed. Freedom of choice signed. 1-[facility] 2-[facility]. [facility] notified. PASRR completed and given to [facility].

## 2025-02-25 NOTE — PT/OT/SLP PROGRESS
Occupational Therapy   Treatment    Name: Lauren Ring  MRN: [id number]    Recommendations:     Recommended therapy intensity at discharge: Moderate Intensity Therapy   Discharge Equipment Recommendations:  to be determined by next level of care  Barriers to discharge:       Assessment:     Lauren Ring is a 64 y.o. female with a medical diagnosis of L displaced femoral neck fx following a fall 2 weeks ago; now s/p L hip hemiarthroplasty on 2/22.  Performance deficits affecting function are weakness, impaired endurance, impaired self care skills, impaired functional mobility, gait instability, impaired balance, decreased safety awareness, decreased lower extremity function, decreased upper extremity function, orthopedic precautions.     Pt with limited activity tolerance 2/2 pain and fatigue. States she was not walking much before this hospitalization 2/2 chronic pain and is unsure if she can tolerate 3 hours of therapy a day. Pt agreeable to changing d/c rec to moderate intensity therapy at d/c. CM aware.    Rehab Prognosis:  Good; patient would benefit from acute skilled OT services to address these deficits and reach maximum level of function.       Plan:     Patient to be seen 6 x/week to address the above listed problems via self-care/home management, therapeutic activities, therapeutic exercises  Plan of Care Expires: 03/23/25  Plan of Care Reviewed with: patient    Subjective     Pain/Comfort:  Location - Side 1: Left  Location 1: hip  Pain Addressed 1: Reposition, Distraction    Objective:     Communicated with: RN prior to session.  Patient found HOB elevated with PureWick upon OT entry to room.    General Precautions: Standard, fall    Orthopedic Precautions:LLE posterior precautions, LLE weight bearing as tolerated  Braces: N/A  Respiratory Status: Nasal cannula, flow 2 L/min     Occupational Performance:     Bed Mobility:    Patient completed Scooting/Bridging with contact guard assistance  Patient  completed Supine to Sit with minimum assistance     Functional Mobility/Transfers:  Patient completed Sit <> Stand Transfer with minimum assistance  with  rolling walker   Functional Mobility: ambulated to bathroom with Min A and RW. Few minor posterior LOB.     Activities of Daily Living:  Toileting: performed toilet t/f with Min A and grab bars. SBA for seated pericare after +void.   UE dressing: donned back gown as robe with SBA.     Therapeutic Positioning    OT interventions performed during the course of today's session in an effort to prevent and/or reduce acquired pressure injuries:   Education was provided on benefits of and recommendations for therapeutic positioning    [facility] 6 Click ADL: 19    Patient Education:  Patient provided with verbal education education regarding OT role/goals/POC, fall prevention, safety awareness, and Discharge/DME recommendations.  Understanding was verbalized.      Patient left up in chair with all lines intact, call button in reach, and CM notified.    GOALS:   Multidisciplinary Problems       Occupational Therapy Goals          Problem: Occupational Therapy    Goal Priority Disciplines Outcome Interventions   Occupational Therapy Goal     OT, PT/OT Progressing    Description: LTG: Pt will perform basic ADLs and ADL transfers with Modified independence using LRAD by discharge.    STG: to be met by 3/23/25    Pt will complete grooming standing at sink with LRAD with SBA.  Pt will complete UB dressing with SBA.  Pt will complete LB dressing with SBA using LRAD and AE prn.  Pt will complete toileting with SBA using LRAD.  Pt will complete functional mobility to/from toilet and toilet transfer with SBA using LRAD.                        Time Tracking:     OT Date of Treatment: 02/25/25  OT Start Time: 1104  OT Stop Time: 1137  OT Total Time (min): 33 min    Billable Minutes:Self Care/Home Management 33    OT/KIMBERLEY: KIMBERLEY     Number of KIMBERLEY visits since last OT visit:  2    2/25/2025

## 2025-02-26 LAB
B PERT.PT PRMT NPH QL NAA+NON-PROBE: NOT DETECTED
BASOPHILS # BLD AUTO: 0.07 X10(3)/MCL
BASOPHILS NFR BLD AUTO: 0.6 %
C PNEUM DNA NPH QL NAA+NON-PROBE: NOT DETECTED
EOSINOPHIL # BLD AUTO: 0.21 X10(3)/MCL (ref 0–0.9)
EOSINOPHIL NFR BLD AUTO: 1.8 %
ERYTHROCYTE [DISTWIDTH] IN BLOOD BY AUTOMATED COUNT: 19.3 % (ref 11.5–17)
FECAL LEUKOCYTE (OHS): POSITIVE
FLUAV AG UPPER RESP QL IA.RAPID: NOT DETECTED
FLUBV AG UPPER RESP QL IA.RAPID: NOT DETECTED
HADV DNA NPH QL NAA+NON-PROBE: NOT DETECTED
HCOV 229E RNA NPH QL NAA+NON-PROBE: NOT DETECTED
HCOV HKU1 RNA NPH QL NAA+NON-PROBE: NOT DETECTED
HCOV NL63 RNA NPH QL NAA+NON-PROBE: NOT DETECTED
HCOV OC43 RNA NPH QL NAA+NON-PROBE: NOT DETECTED
HCT VFR BLD AUTO: 30.3 % (ref 37–47)
HGB BLD-MCNC: 9.3 G/DL (ref 12–16)
HMPV RNA NPH QL NAA+NON-PROBE: NOT DETECTED
HPIV1 RNA NPH QL NAA+NON-PROBE: NOT DETECTED
HPIV2 RNA NPH QL NAA+NON-PROBE: NOT DETECTED
HPIV3 RNA NPH QL NAA+NON-PROBE: NOT DETECTED
HPIV4 RNA NPH QL NAA+NON-PROBE: NOT DETECTED
IMM GRANULOCYTES # BLD AUTO: 0.07 X10(3)/MCL (ref 0–0.04)
IMM GRANULOCYTES NFR BLD AUTO: 0.6 %
LACTATE SERPL-SCNC: 1.8 MMOL/L (ref 0.5–2.2)
LACTATE SERPL-SCNC: 2.3 MMOL/L (ref 0.5–2.2)
LYMPHOCYTES # BLD AUTO: 0.53 X10(3)/MCL (ref 0.6–4.6)
LYMPHOCYTES NFR BLD AUTO: 4.5 %
M PNEUMO DNA NPH QL NAA+NON-PROBE: NOT DETECTED
MCH RBC QN AUTO: 25.7 PG (ref 27–31)
MCHC RBC AUTO-ENTMCNC: 30.7 G/DL (ref 33–36)
MCV RBC AUTO: 83.7 FL (ref 80–94)
MONOCYTES # BLD AUTO: 0.82 X10(3)/MCL (ref 0.1–1.3)
MONOCYTES NFR BLD AUTO: 7 %
MRSA PCR SCRN (OHS): NOT DETECTED
NEUTROPHILS # BLD AUTO: 10.07 X10(3)/MCL (ref 2.1–9.2)
NEUTROPHILS NFR BLD AUTO: 85.5 %
NRBC BLD AUTO-RTO: 0 %
OHS QRS DURATION: 68 MS
OHS QTC CALCULATION: 468 MS
PLATELET # BLD AUTO: 449 X10(3)/MCL (ref 130–400)
PMV BLD AUTO: 10 FL (ref 7.4–10.4)
RBC # BLD AUTO: 3.62 X10(6)/MCL (ref 4.2–5.4)
RSV A 5' UTR RNA NPH QL NAA+PROBE: NOT DETECTED
RSV RNA NPH QL NAA+NON-PROBE: NOT DETECTED
RV+EV RNA NPH QL NAA+NON-PROBE: NOT DETECTED
SARS-COV-2 RNA RESP QL NAA+PROBE: NOT DETECTED
WBC # BLD AUTO: 11.77 X10(3)/MCL (ref 4.5–11.5)

## 2025-02-26 PROCEDURE — 76937 US GUIDE VASCULAR ACCESS: CPT

## 2025-02-26 PROCEDURE — 93010 ELECTROCARDIOGRAM REPORT: CPT | Mod: ,,, | Performed by: INTERNAL MEDICINE

## 2025-02-26 PROCEDURE — 25000242 PHARM REV CODE 250 ALT 637 W/ HCPCS: Performed by: INTERNAL MEDICINE

## 2025-02-26 PROCEDURE — 11000001 HC ACUTE MED/SURG PRIVATE ROOM

## 2025-02-26 PROCEDURE — 87040 BLOOD CULTURE FOR BACTERIA: CPT

## 2025-02-26 PROCEDURE — 83605 ASSAY OF LACTIC ACID: CPT

## 2025-02-26 PROCEDURE — 63600175 PHARM REV CODE 636 W HCPCS

## 2025-02-26 PROCEDURE — 63600175 PHARM REV CODE 636 W HCPCS: Performed by: INTERNAL MEDICINE

## 2025-02-26 PROCEDURE — 99900035 HC TECH TIME PER 15 MIN (STAT)

## 2025-02-26 PROCEDURE — 36410 VNPNXR 3YR/> PHY/QHP DX/THER: CPT

## 2025-02-26 PROCEDURE — 25000003 PHARM REV CODE 250

## 2025-02-26 PROCEDURE — 21400001 HC TELEMETRY ROOM

## 2025-02-26 PROCEDURE — 0241U COVID/RSV/FLU A&B PCR: CPT

## 2025-02-26 PROCEDURE — 89055 LEUKOCYTE ASSESSMENT FECAL: CPT | Performed by: INTERNAL MEDICINE

## 2025-02-26 PROCEDURE — 87581 M.PNEUMON DNA AMP PROBE: CPT

## 2025-02-26 PROCEDURE — 94760 N-INVAS EAR/PLS OXIMETRY 1: CPT

## 2025-02-26 PROCEDURE — 93005 ELECTROCARDIOGRAM TRACING: CPT

## 2025-02-26 PROCEDURE — 85025 COMPLETE CBC W/AUTO DIFF WBC: CPT | Performed by: INTERNAL MEDICINE

## 2025-02-26 PROCEDURE — 25000003 PHARM REV CODE 250: Performed by: INTERNAL MEDICINE

## 2025-02-26 PROCEDURE — 02HV33Z INSERTION OF INFUSION DEVICE INTO SUPERIOR VENA CAVA, PERCUTANEOUS APPROACH: ICD-10-PCS | Performed by: ORTHOPAEDIC SURGERY

## 2025-02-26 PROCEDURE — C1751 CATH, INF, PER/CENT/MIDLINE: HCPCS

## 2025-02-26 PROCEDURE — 36415 COLL VENOUS BLD VENIPUNCTURE: CPT

## 2025-02-26 PROCEDURE — 27000221 HC OXYGEN, UP TO 24 HOURS

## 2025-02-26 PROCEDURE — 87641 MR-STAPH DNA AMP PROBE: CPT | Performed by: INTERNAL MEDICINE

## 2025-02-26 PROCEDURE — 87427 SHIGA-LIKE TOXIN AG IA: CPT | Performed by: INTERNAL MEDICINE

## 2025-02-26 RX ORDER — SODIUM CHLORIDE 9 MG/ML
INJECTION, SOLUTION INTRAVENOUS CONTINUOUS
Status: DISCONTINUED | OUTPATIENT
Start: 2025-02-26 | End: 2025-03-04

## 2025-02-26 RX ORDER — ACETAMINOPHEN 10 MG/ML
1000 INJECTION, SOLUTION INTRAVENOUS ONCE
Status: COMPLETED | OUTPATIENT
Start: 2025-02-26 | End: 2025-02-26

## 2025-02-26 RX ORDER — ACETAMINOPHEN 500 MG
1000 TABLET ORAL EVERY 6 HOURS PRN
Status: DISCONTINUED | OUTPATIENT
Start: 2025-02-26 | End: 2025-03-07 | Stop reason: HOSPADM

## 2025-02-26 RX ORDER — SODIUM CHLORIDE 0.9 % (FLUSH) 0.9 %
10 SYRINGE (ML) INJECTION EVERY 12 HOURS PRN
Status: DISCONTINUED | OUTPATIENT
Start: 2025-02-26 | End: 2025-03-07 | Stop reason: HOSPADM

## 2025-02-26 RX ADMIN — ENOXAPARIN SODIUM 40 MG: 40 INJECTION SUBCUTANEOUS at 06:02

## 2025-02-26 RX ADMIN — VANCOMYCIN HYDROCHLORIDE 1250 MG: 1.25 INJECTION, POWDER, LYOPHILIZED, FOR SOLUTION INTRAVENOUS at 09:02

## 2025-02-26 RX ADMIN — ALPRAZOLAM 0.5 MG: 0.5 TABLET ORAL at 09:02

## 2025-02-26 RX ADMIN — CETIRIZINE HYDROCHLORIDE 10 MG: 10 TABLET, FILM COATED ORAL at 09:02

## 2025-02-26 RX ADMIN — ACETAMINOPHEN 1000 MG: 500 TABLET ORAL at 04:02

## 2025-02-26 RX ADMIN — ONDANSETRON 4 MG: 2 INJECTION INTRAMUSCULAR; INTRAVENOUS at 12:02

## 2025-02-26 RX ADMIN — FERROUS SULFATE TAB 325 MG (65 MG ELEMENTAL FE) 1 EACH: 325 (65 FE) TAB at 09:02

## 2025-02-26 RX ADMIN — SODIUM CHLORIDE: 9 INJECTION, SOLUTION INTRAVENOUS at 12:02

## 2025-02-26 RX ADMIN — SODIUM CHLORIDE: 9 INJECTION, SOLUTION INTRAVENOUS at 09:02

## 2025-02-26 RX ADMIN — OXYCODONE AND ACETAMINOPHEN 1 TABLET: 10; 325 TABLET ORAL at 09:02

## 2025-02-26 RX ADMIN — FAMOTIDINE 40 MG: 20 TABLET, FILM COATED ORAL at 09:02

## 2025-02-26 RX ADMIN — FLUTICASONE FUROATE AND VILANTEROL TRIFENATATE 1 PUFF: 100; 25 POWDER RESPIRATORY (INHALATION) at 09:02

## 2025-02-26 RX ADMIN — VANCOMYCIN HYDROCHLORIDE 750 MG: 750 INJECTION, POWDER, LYOPHILIZED, FOR SOLUTION INTRAVENOUS at 09:02

## 2025-02-26 RX ADMIN — OXYBUTYNIN CHLORIDE 5 MG: 5 TABLET ORAL at 09:02

## 2025-02-26 RX ADMIN — ONDANSETRON 4 MG: 2 INJECTION INTRAMUSCULAR; INTRAVENOUS at 05:02

## 2025-02-26 RX ADMIN — ACETAMINOPHEN 1000 MG: 10 INJECTION, SOLUTION INTRAVENOUS at 11:02

## 2025-02-26 NOTE — PLAN OF CARE
@1000Received communication from Dulce Maria with [facility] stated patient not yet ready and doctor recommends further stabilization prior transfer.     @944 Perlita with [facility] stated they were unable to reach patient's daughter or patient, but would try again.    [facility] sent clinical updates to [facility] and [facility].

## 2025-02-26 NOTE — PLAN OF CARE
Pt not medically ready. Notified [facility] to hold off on [facility] [facility] submitting for auth.

## 2025-02-26 NOTE — PLAN OF CARE
Received a call back from Perlita with [facility], they've clinically accepted patient and would like to submit to insurance once medically ready. SHARIFA notified.

## 2025-02-26 NOTE — NURSING
Notified Dr. Steinberg patient started with profuse vomitting and violent diarrhea last night accompanied by high fevers- temp last night spiked up to 103- at present temp 101. New orders noted.   1345 Patients  attempted to go to patients room- security called and came up to explain to patients  he is not allowed to visit. Security escorted him off unit.   1400 Bedding notified, and new room assigned. Patient made aware and states he is not allowed to visit.

## 2025-02-26 NOTE — PT/OT/SLP PROGRESS
Attempted to see pt for OT session. Pt politely declined to participate 2/2 not feeling well enough to participate. Per nurse pt funning fever and vomited this AM. OT to f/u as schedule allows.

## 2025-02-26 NOTE — NURSING
"1645- assissted pt to bedside commode, after she gto back in bed, she received a phone call while I was in room. After she got off the phone, she began crying. I asked her if everything was ok ? Pt stated "No my  is on his way and I dont know what he is going to do over here,' I asked her what she meant by that? She stated "He said my hip was fixed and I needed to be back home, and that he was coming get me and nothing was gonna stop him,". I consoled pt and asked her if he had ever been physical with her or threatened her. She just stated " he gets crazy and I dont know what he is capable of," I asked if he had access to any guns or weapons, she stated no. Pt was very tearful and shaking. I consoled her and asked her if she wanted us to call security or the . She stated " I dont know what to do , I'm just scared and I dont want yall to throw me out because of him," I notified charge nurse, who collaborated with the manager. We asked the pt if she wanted us to put her in another room and explained putting her as a VIP to protect her identity and location from Emmett her S.O. pt, agreed to plan . We moved pt from room 833 to 821, instr her she would steven have 2 visitors allowed, pt chose her dtg and neighbor. Instr pt to inform the 2 guests to nit discuss her location with Emmett . Verbalizes understands. After we moved pt , she was calmer, had stopped crying, and was consolable. Pt thanked us for moving her and keeping her safe, she stated she was embarrassed. Consoled her and reinforced her safety and the safety of others and staff are top priority and to report any threats or concerns to staff immediately .  "

## 2025-02-26 NOTE — PT/OT/SLP PROGRESS
Attempted to see pt for PT tx. Pt politely declined tx session 2/2 not feeling well enough to participate. Per nurse pt funning fever and vomited this AM. PT to f/u as schedule allows.

## 2025-02-26 NOTE — PROGRESS NOTES
"Pharmacokinetic Initial Assessment: IV Vancomycin    Assessment/Plan:    Initiate intravenous vancomycin with loading dose of 1250 mg once followed by a maintenance dose of vancomycin 750mg IV every 12 hours.  Desired empiric serum trough concentration is 15 to 20 mcg/mL.  Draw vancomycin trough level 60 min prior to fourth dose on 02/27 at approximately 2000.  Pharmacy will continue to follow and monitor vancomycin.      Please contact pharmacy at extension [phone] with any questions regarding this assessment.     Thank you for the consult,   Carmina Jones       Patient brief summary:  Lauren Ring is a 64 y.o. female initiated on antimicrobial therapy with IV Vancomycin for treatment of suspected bone/joint infection    Drug Allergies:   Review of patient's allergies indicates:   Allergen Reactions    Codeine Itching    Influenza virus vaccines Other (See Comments)     Adverse Reaction    Tetanus toxoid        Actual Body Weight:   52.2 kg    Renal Function:   Estimated Creatinine Clearance: 79.1 mL/min (based on SCr of 0.6 mg/dL).,     Dialysis Method (if applicable):  N/A    CBC (last 72 hours):  Recent Labs   Lab Result Units 02/25/25  0452 02/26/25  0544   WBC x10(3)/mcL 9.18 11.77*   Hgb g/dL 8.2* 9.3*   Hct % 26.4* 30.3*   Platelet x10(3)/mcL 311 449*   Mono % % 16.6 7.0   Eos % % 1.3 1.8   Basophil % % 0.7 0.6       Metabolic Panel (last 72 hours):  Recent Labs   Lab Result Units 02/25/25  0452   Sodium mmol/L 140   Potassium mmol/L 4.2   Chloride mmol/L 105   CO2 mmol/L 27   Glucose mg/dL 111   Blood Urea Nitrogen mg/dL 6.3*   Creatinine mg/dL 0.60   Albumin g/dL 2.2*   Bilirubin Total mg/dL 0.3   ALP unit/L 71   AST unit/L 23   ALT unit/L 12   Magnesium Level mg/dL 1.70       Drug levels (last 3 results):  No results for input(s): "VANCOMYCINRA", "VANCORANDOM", "VANCOMYCINPE", "VANCOPEAK", "VANCOMYCINTR", "VANCOTROUGH" in the last 72 hours.    Microbiologic Results:  Microbiology Results (last 7 days)  "      Procedure Component Value Units Date/Time    Blood Culture [[id number]] Collected: 02/26/25 0731    Order Status: Sent Specimen: Blood from Hand, Right Updated: 02/26/25 0734    Blood Culture [[id number]] Collected: 02/26/25 0727    Order Status: Sent Specimen: Blood from Arm, Left Updated: 02/26/25 0731

## 2025-02-26 NOTE — PROGRESS NOTES
[facility]  Hospital Medicine Progress Note        Chief Complaint: Inpatient Follow-up for Hip Pain (C/o L hip pain x 2 weeks. States fall x 2 weeks ago and pain since. Denies any new recent trauma injury. GCS 15 )    HPI: Lauren Ring is a 64 y.o. 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 The patient presented to [facility] on 2/21/2025 with a primary complaint of pain to left hip after falling from bed around 2 weeks ago.  Patient was seen at emergency room [facility] and diagnosed with displaced left femoral neck fracture with a shortening.  Patient has been seen by Orthopedics with plans for left hip arthroplasty tomorrow.  Additional history includes hepatitis-C treated as well as lung cancer status post lobectomy.  Patient is supposed to be on O2 but not currently.  We will go ahead and place her on tele.  Patient voices no complaints.  No distress and pain appears to be controlled.  Physical examination her left leg is shortened and laterally displaced.    Interval Hx:     02/22/2025 Dr. Steinberg-patient is status post left hip hemiarthroplasty without complications.  She remains alert/active and oriented and requesting food.      2/23/25 dr yan yousif /  stable . Looks great . Wants better pain control     02/24/2025 Dr. Steinberg-chart reviewed patient examined she voices no complaints.  Tolerating physical therapy better.  Pain better controlled.  We will draw labs for tomorrow in  is working on discharge planning/placement    02/25/2025  Dr. Stienberg-still low to progress requiring assistance for ambulation.   looking into skilled nursing facility.  Voices no complaints, no distress    02/26/2025 Dr. Steinberg-chart reviewed patient examined.  She was doing well last night when she was last seen.  Now with nausea/vomiting/diarrhea/fever/tender surgical site with some erythema and induration as well as right upper extremity medial aspect where previous IV site was with erythema and induration.              Case was discussed with patient's nurse and  on the floor.    Objective/physical exam:  General: In no acute distress, afebrile  Chest:decrease breath sounds right lung field  Heart: RRR, +S1, S2, no appreciable murmur  Abdomen: Soft, nontender, BS +  MSK: Warm, no lower extremity edema, no clubbing or cyanosis  Neurologic: Alert and oriented x4, Cranial nerve II-XII intact, Strength 5/5 in all 4 extremities    VITAL SIGNS: 24 HRS MIN & MAX LAST   Temp  Min: 98.4 °F (36.9 °C)  Max: 103 °F (39.4 °C) (!) 102.2 °F (39 °C)   BP  Min: 104/67  Max: 130/72 109/67   Pulse  Min: 63  Max: 122  (!) 114   Resp  Min: 18  Max: 20 20   SpO2  Min: 95 %  Max: 99 % 97 %     I have reviewed the following labs:  Recent Labs   Lab 02/23/25 0433 02/25/25  0452 02/26/25  0544   WBC 15.37* 9.18 11.77*   RBC 4.03* 3.14* 3.62*   HGB 10.3* 8.2* 9.3*   HCT 34.1* 26.4* 30.3*   MCV 84.6 84.1 83.7   MCH 25.6* 26.1* 25.7*   MCHC 30.2* 31.1* 30.7*   RDW 18.7* 19.2* 19.3*    311 449*   MPV 9.7 9.8 10.0     Recent Labs   Lab 02/22/25  0446 02/23/25 0433 02/25/25  0452    136 140   K 3.9 4.0 4.2    103 105   CO2 27 23 27   BUN 6.8* 6.4* 6.3*   CREATININE 0.64 0.70 0.60   CALCIUM 8.7 8.5 8.5   MG 1.80 1.80 1.70   ALBUMIN 2.6* 2.7* 2.2*   ALKPHOS 79 83 71   ALT 12 15 12   AST 22 26 23   BILITOT 0.3 0.3 0.3     Microbiology Results (last 7 days)       Procedure Component Value Units Date/Time    Stool Culture [[id number]] Collected: 02/26/25 1054     Order Status: Sent Specimen: Stool     Blood Culture [[id number]] Collected: 02/26/25 0731    Order Status: Sent Specimen: Blood from Hand, Right Updated: 02/26/25 0734    Blood Culture [[id number]] Collected: 02/26/25 0727    Order Status: Sent Specimen: Blood from Arm, Left Updated: 02/26/25 0731             See below for Radiology    Assessment/Plan:  1.-fall from bed   2.-left femoral neck fracture with shortening  -status post left hip hemiarthroplasty 02/22/2025  - percocet 10/325 mgs po q 6 hours for pain / morphine 2 mgs for breakthrough   3-hypokalemia-replaced  4.-nausea/vomiting/diarrhea/abdominal pain  -CTA abdomen and pelvis without contrast  -patient has been pancultured  -NPO  -vancomycin initiated    5.-fever  -continue cooling blankets/ofirmev/pancultured    6.-right upper extremity medial aspect induration with erythema   -right upper extremity ultrasound    7.-left hip surgical site with mild erythema/induration  -CT abdomen and pelvis without contrast we will follow    8. -diarrhea  -fecal leukocytes/stool cultures      The patient in no distress but with sudden onset of nausea/vomiting/diarrhea/abdominal pain/fever with mild erythema surrounding left hip surgical site as well as right upper extremity medial aspect erythema were previous IV site was.  Vancomycin was initiated/patient was pancultured and workup in progress.  We will keep NPO for now and round on her again later to review imaging/labs.  She has some leukocytosis as well  .  Lactic acid elevated at 2.3, patient with very poor IV access we will go ahead and place midline and start normal saline solution at 150 cc/hour.  We will trend lactic acid     History of COPD/emphysema/lung cancer/lobectomy/hepatitis-C treated/anxiety/depression        PT/OT/ for discharge planning-snf      Addendum Dr. Steinberg 02/26/2025 at 1:51 p.m.-CT abdomen and pelvis pertinent for postoperative ileus.  We will keep NPO.  Continue hydration  as well as antibiotics and await reports on right upper extremity ultrasound.  The patient throws up again we might have to place an NG tube.  A.m. labs.  Follow cultures    VTE prophylaxis: lovenox    Patient condition:  Stable    Anticipated discharge and Disposition:   tbd      All diagnosis and differential diagnosis have been reviewed; assessment and plan has been documented; I have personally reviewed the labs and test results that are presently available; I have reviewed the patients medication list; I have reviewed the consulting providers response and recommendations. I have reviewed or attempted to review medical records based upon their availability    All of the patient's questions have been  addressed and answered. Patient's is agreeable to the above stated plan. I will continue to monitor closely and make adjustments to medical management as needed.    Portions of this note dictated using EMR integrated voice recognition software, and may be subject to voice recognition errors not corrected at proofreading. Please contact writer for clarification if needed.   _____________________________________________________________________    Malnutrition Status:  Nutrition consulted. Most recent weight and BMI monitored-     Measurements:  Wt Readings from Last 1 Encounters:   02/24/25 52.2 kg (115 lb 1.3 oz)   Body mass index is 18.58 kg/m².    Patient has been screened and assessed by RD.    Malnutrition Type:  Context: chronic illness  Level: severe    Malnutrition Characteristic Summary:  Weight Loss (Malnutrition): other (see comments) (Does not meet criteria)  Energy Intake (Malnutrition): less than or equal to 75% for greater than or equal to 1 month  Subcutaneous Fat (Malnutrition): severe depletion  Muscle Mass (Malnutrition): severe depletion  Fluid Accumulation (Malnutrition): other (see comments) (Not present)    Interventions/Recommendations (treatment strategy):  Oral nutritional supplement      Scheduled Med:   acetaminophen  1,000 mg Intravenous Once    cetirizine  10 mg Oral Daily    enoxparin  40 mg Subcutaneous Q24H (prophylaxis, 1700)    famotidine  40 mg Oral Daily    ferrous sulfate  1 tablet Oral Daily with breakfast    fluticasone furoate-vilanteroL  1 puff Inhalation Daily    oxybutynin  5 mg Oral TID    tiotropium bromide  2 puff Inhalation Daily    vancomycin 1,250 mg in D5W 250 mL IVPB (admixture device)  1,250 mg Intravenous Once    vancomycin 750 mg in D5W 250 mL IVPB (admixture device)  750 mg Intravenous Q12H      Continuous Infusions:     PRN Meds:    Current Facility-Administered Medications:     acetaminophen, 1,000 mg, Oral, Q6H PRN    albuterol, 2 puff, Inhalation, Q6H PRN    albuterol-ipratropium, 3 mL, Nebulization, TID PRN    ALPRAZolam, 0.5 mg, Oral, TID PRN    diphenhydrAMINE, 25 mg, Oral, Q6H PRN    morphine, 2 mg, Intravenous, Q4H PRN    ondansetron, 4 mg, Intravenous, Q4H PRN    oxyCODONE-acetaminophen, 1 tablet, Oral, Q6H PRN    vancomycin - pharmacy to dose, , Intravenous, pharmacy to manage frequency     Radiology:  I have personally reviewed the following imaging and agree with the radiologist.     X-Ray Chest 1 View  Narrative: EXAMINATION:  XR CHEST 1 VIEW    CLINICAL HISTORY:  fever unknown source;    TECHNIQUE:  AP chest    COMPARISON:  Chest x-ray dated 02/21/2025    FINDINGS:  The heart is normal in size.  There is no focal airspace consolidation.  There is no pleural effusion or visible pneumothorax.  Impression: No acute abnormality of the chest.    Electronically signed by: Lisa Jones  Date:    02/26/2025  Time:    06:20      Agustín Steinberg MD  Department of Hospital Medicine   [facility]   02/26/2025

## 2025-02-26 NOTE — PROCEDURES
"Lauren Ring is a 64 y.o. female patient.    Temp: (!) 102.2 °F (39 °C) (02/26/25 1112)  Pulse: (!) 114 (02/26/25 1112)  Resp: 20 (02/26/25 1112)  BP: 109/67 (02/26/25 1112)  SpO2: 97 % (02/26/25 1112)  Weight: 52.2 kg (115 lb 1.3 oz) (02/24/25 1013)  Height: 5' 5.98" (167.6 cm) (02/24/25 1013)    PICC  Date/Time: 2/26/2025 12:53 PM  Performed by: Daljit Davis RN  Consent Done: Yes  Time out: Immediately prior to procedure a time out was called to verify the correct patient, procedure, equipment, support staff and site/side marked as required  Indications: med administration and vascular access  Anesthesia: local infiltration  Local anesthetic: lidocaine 1% without epinephrine  Anesthetic Total (mL): 3  Preparation: skin prepped with ChloraPrep  Skin prep agent dried: skin prep agent completely dried prior to procedure  Sterile barriers: all five maximum sterile barriers used - cap, mask, sterile gown, sterile gloves, and large sterile sheet  Hand hygiene: hand hygiene performed prior to central venous catheter insertion  Location details: left brachial  Catheter type: single lumen  Catheter size: 4 Fr  Catheter Length: 16cm    Ultrasound guidance: yes  Vessel Caliber: patent, compressibility normal  Vascular Doppler: not done  Needle advanced into vessel with real time Ultrasound guidance.  Guidewire confirmed in vessel.  Sterile sheath used.  no esophageal manometryNumber of attempts: 1  Post-procedure: sterile dressing applied, blood return through all ports and chlorhexidine patch    Assessment: successful placement  Complications: none          Daljit Davis RN  2/26/2025    "

## 2025-02-26 NOTE — PROGRESS NOTES
[facility]  Hospital Medicine Progress Note        Chief Complaint: Inpatient Follow-up for Hip Pain (C/o L hip pain x 2 weeks. States fall x 2 weeks ago and pain since. Denies any new recent trauma injury. GCS 15 )    HPI: Lauren Ring is a 64 y.o. 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 The patient presented to [facility] on 2/21/2025 with a primary complaint of pain to left hip after falling from bed around 2 weeks ago.  Patient was seen at emergency room [facility] and diagnosed with displaced left femoral neck fracture with a shortening.  Patient has been seen by Orthopedics with plans for left hip arthroplasty tomorrow.  Additional history includes hepatitis-C treated as well as lung cancer status post lobectomy.  Patient is supposed to be on O2 but not currently.  We will go ahead and place her on tele.  Patient voices no complaints.  No distress and pain appears to be controlled.  Physical examination her left leg is shortened and laterally displaced.    Interval Hx:     02/22/2025 Dr. Steinberg-patient is status post left hip hemiarthroplasty without complications.  She remains alert/active and oriented and requesting food.      2/23/25 dr yan yousif /  stable . Looks great . Wants better pain control     02/24/2025 Dr. Steinberg-chart reviewed patient examined she voices no complaints.  Tolerating physical therapy better.  Pain better controlled.  We will draw labs for tomorrow in  is working on discharge planning/placement    02/25/2025  Dr. Steinberg-still low to progress requiring assistance for ambulation.   looking into skilled nursing facility.  Voices no complaints, no distress          Case was discussed with patient's nurse and  on the floor.    Objective/physical exam:  General: In no acute distress, afebrile  Chest:decrease breath sounds right lung field  Heart: RRR, +S1, S2, no appreciable murmur  Abdomen: Soft, nontender, BS +  MSK: Warm, no lower extremity edema, no clubbing or cyanosis  Neurologic: Alert and oriented x4, Cranial nerve II-XII intact, Strength 5/5 in all 4 extremities    VITAL SIGNS: 24 HRS MIN & MAX LAST   Temp  Min: 98.4 °F (36.9 °C)  Max: 98.9 °F (37.2 °C) 98.9 °F (37.2 °C)   BP  Min: 97/63  Max: 110/70 97/63   Pulse  Min: 96  Max: 109  106   Resp  Min: 16  Max: 20 20   SpO2  Min: 90 %  Max: 97 % (!) 90 %     I have reviewed the following labs:  Recent Labs   Lab 02/22/25 0446 02/23/25 0433 02/25/25 0452   WBC 7.85 15.37* 9.18   RBC 4.27 4.03* 3.14*   HGB 10.9* 10.3* 8.2*   HCT 35.9* 34.1* 26.4*   MCV 84.1 84.6 84.1   MCH 25.5* 25.6* 26.1*   MCHC 30.4* 30.2* 31.1*   RDW 19.0* 18.7* 19.2*    358 311   MPV 9.6 9.7 9.8     Recent Labs   Lab 02/22/25 0446 02/23/25 0433 02/25/25  0452    136 140   K 3.9 4.0 4.2    103 105   CO2 27 23 27   BUN 6.8* 6.4* 6.3*   CREATININE 0.64 0.70 0.60   CALCIUM 8.7 8.5 8.5   MG 1.80 1.80 1.70   ALBUMIN 2.6* 2.7* 2.2*   ALKPHOS 79 83 71   ALT 12 15 12   AST 22 26 23   BILITOT 0.3 0.3 0.3     Microbiology Results (last 7 days)       ** No results found for the last 168 hours. **             See below for Radiology    Assessment/Plan:  1.-fall from bed   2.-left femoral neck fracture with shortening  -status post left hip hemiarthroplasty 02/22/2025  - percocet 10/325 mgs po q 6 hours for pain / morphine 2 mgs for breakthrough   3-hypokalemia-replaced     History of COPD/emphysema/lung cancer/lobectomy/hepatitis-C treated/anxiety/depression         PT/OT/ for discharge planning-[facility]    VTE prophylaxis: lovenox    Patient condition:  Stable    Anticipated discharge and Disposition:   tbd      All diagnosis and differential diagnosis have been reviewed; assessment and plan has been documented; I have personally reviewed the labs and test results that are presently available; I have reviewed the patients medication list; I have reviewed the consulting providers response and recommendations. I have reviewed or attempted to review medical records based upon their availability    All of the patient's questions have been  addressed and answered. Patient's is agreeable to the above stated plan. I will continue to monitor closely and make adjustments to medical management as needed.    Portions of this note dictated using EMR integrated voice recognition software, and may be subject to voice recognition errors not corrected at proofreading. Please contact writer for clarification if needed.   _____________________________________________________________________    Malnutrition Status:  Nutrition consulted. Most recent weight and BMI monitored-     Measurements:  Wt Readings from Last 1 Encounters:   02/24/25 52.2 kg (115 lb 1.3 oz)   Body mass index is 18.58 kg/m².    Patient has been screened and assessed by RD.    Malnutrition Type:  Context: chronic illness  Level: severe    Malnutrition Characteristic Summary:  Weight Loss (Malnutrition): other (see comments) (Does not meet criteria)  Energy Intake (Malnutrition): less than or equal to 75% for greater than or equal to 1 month  Subcutaneous Fat (Malnutrition): severe depletion  Muscle Mass (Malnutrition): severe depletion  Fluid Accumulation (Malnutrition): other (see comments) (Not present)    Interventions/Recommendations (treatment strategy):  Oral nutritional supplement     Scheduled Med:   cetirizine  10 mg Oral Daily    enoxparin  40 mg Subcutaneous Q24H (prophylaxis, 1700)    famotidine  40 mg  Oral Daily    ferrous sulfate  1 tablet Oral Daily with breakfast    fluticasone furoate-vilanteroL  1 puff Inhalation Daily    oxybutynin  5 mg Oral TID    tiotropium bromide  2 puff Inhalation Daily      Continuous Infusions:     PRN Meds:    Current Facility-Administered Medications:     albuterol, 2 puff, Inhalation, Q6H PRN    albuterol-ipratropium, 3 mL, Nebulization, TID PRN    ALPRAZolam, 0.5 mg, Oral, TID PRN    diphenhydrAMINE, 25 mg, Oral, Q6H PRN    morphine, 2 mg, Intravenous, Q4H PRN    ondansetron, 4 mg, Intravenous, Q4H PRN    oxyCODONE-acetaminophen, 1 tablet, Oral, Q6H PRN     Radiology:  I have personally reviewed the following imaging and agree with the radiologist.     X-Ray Hip 1 View Left (with Pelvis when performed)  EXAMINATION  XR HIP 1 VIEW LEFT (WITH PELVIS WHEN PERFORMED)    CLINICAL HISTORY  f/u fracture;    TECHNIQUE  A total of 1 image(s) submitted of the left hip.    COMPARISON  21 February 2025    FINDINGS  Postoperative changes of left hip arthroplasty are present. There is no evidence of immediate hardware abnormality or loosening. No concerning osseous finding is appreciated.    Acute postsurgical soft tissue changes are noted. No retained instrument or other radiopaque foreign body is identified.    IMPRESSION  Changes of left hip arthroplasty, with no unexpected postoperative findings.    Electronically signed by: Lit Land  Date:    02/23/2025  Time:    14:57      Agustín Steinberg MD  Department of Hospital Medicine   [facility]   02/25/2025

## 2025-02-27 LAB
ALBUMIN SERPL-MCNC: 1.9 G/DL (ref 3.4–4.8)
ALBUMIN/GLOB SERPL: 0.7 RATIO (ref 1.1–2)
ALP SERPL-CCNC: 68 UNIT/L (ref 40–150)
ALT SERPL-CCNC: 14 UNIT/L (ref 0–55)
ANION GAP SERPL CALC-SCNC: 7 MEQ/L
AST SERPL-CCNC: 26 UNIT/L (ref 5–34)
BACTERIA #/AREA URNS AUTO: ABNORMAL /HPF
BASOPHILS # BLD AUTO: 0.05 X10(3)/MCL
BASOPHILS NFR BLD AUTO: 0.7 %
BILIRUB SERPL-MCNC: 0.4 MG/DL
BILIRUB UR QL STRIP.AUTO: NEGATIVE
BUN SERPL-MCNC: 6.8 MG/DL (ref 9.8–20.1)
CALCIUM SERPL-MCNC: 7.4 MG/DL (ref 8.4–10.2)
CHLORIDE SERPL-SCNC: 111 MMOL/L (ref 98–107)
CLARITY UR: ABNORMAL
CO2 SERPL-SCNC: 22 MMOL/L (ref 23–31)
COLOR UR AUTO: ABNORMAL
CREAT SERPL-MCNC: 0.55 MG/DL (ref 0.55–1.02)
CREAT/UREA NIT SERPL: 12
EOSINOPHIL # BLD AUTO: 0.12 X10(3)/MCL (ref 0–0.9)
EOSINOPHIL NFR BLD AUTO: 1.7 %
ERYTHROCYTE [DISTWIDTH] IN BLOOD BY AUTOMATED COUNT: 19.1 % (ref 11.5–17)
GFR SERPLBLD CREATININE-BSD FMLA CKD-EPI: >60 ML/MIN/1.73/M2
GLOBULIN SER-MCNC: 2.7 GM/DL (ref 2.4–3.5)
GLUCOSE SERPL-MCNC: 94 MG/DL (ref 82–115)
GLUCOSE UR QL STRIP: NORMAL
HCT VFR BLD AUTO: 25.6 % (ref 37–47)
HGB BLD-MCNC: 8 G/DL (ref 12–16)
HGB UR QL STRIP: NEGATIVE
IMM GRANULOCYTES # BLD AUTO: 0.05 X10(3)/MCL (ref 0–0.04)
IMM GRANULOCYTES NFR BLD AUTO: 0.7 %
KETONES UR QL STRIP: NEGATIVE
LEUKOCYTE ESTERASE UR QL STRIP: NEGATIVE
LYMPHOCYTES # BLD AUTO: 1.09 X10(3)/MCL (ref 0.6–4.6)
LYMPHOCYTES NFR BLD AUTO: 15.4 %
MAGNESIUM SERPL-MCNC: 1.6 MG/DL (ref 1.6–2.6)
MCH RBC QN AUTO: 25.7 PG (ref 27–31)
MCHC RBC AUTO-ENTMCNC: 31.3 G/DL (ref 33–36)
MCV RBC AUTO: 82.3 FL (ref 80–94)
MONOCYTES # BLD AUTO: 1.36 X10(3)/MCL (ref 0.1–1.3)
MONOCYTES NFR BLD AUTO: 19.2 %
MUCOUS THREADS URNS QL MICRO: ABNORMAL /LPF
NEUTROPHILS # BLD AUTO: 4.41 X10(3)/MCL (ref 2.1–9.2)
NEUTROPHILS NFR BLD AUTO: 62.3 %
NITRITE UR QL STRIP: NEGATIVE
NRBC BLD AUTO-RTO: 0 %
PH UR STRIP: 5.5 [PH]
PLATELET # BLD AUTO: 324 X10(3)/MCL (ref 130–400)
PMV BLD AUTO: 10.5 FL (ref 7.4–10.4)
POTASSIUM SERPL-SCNC: 2.5 MMOL/L (ref 3.5–5.1)
PROT SERPL-MCNC: 4.6 GM/DL (ref 5.8–7.6)
PROT UR QL STRIP: NEGATIVE
RBC # BLD AUTO: 3.11 X10(6)/MCL (ref 4.2–5.4)
RBC #/AREA URNS AUTO: ABNORMAL /HPF
SODIUM SERPL-SCNC: 140 MMOL/L (ref 136–145)
SP GR UR STRIP.AUTO: 1.01 (ref 1–1.03)
SQUAMOUS #/AREA URNS LPF: ABNORMAL /HPF
UROBILINOGEN UR STRIP-ACNC: NORMAL
WBC # BLD AUTO: 7.08 X10(3)/MCL (ref 4.5–11.5)
WBC #/AREA URNS AUTO: ABNORMAL /HPF

## 2025-02-27 PROCEDURE — 63600175 PHARM REV CODE 636 W HCPCS

## 2025-02-27 PROCEDURE — 63600175 PHARM REV CODE 636 W HCPCS: Performed by: INTERNAL MEDICINE

## 2025-02-27 PROCEDURE — 85025 COMPLETE CBC W/AUTO DIFF WBC: CPT | Performed by: INTERNAL MEDICINE

## 2025-02-27 PROCEDURE — 80053 COMPREHEN METABOLIC PANEL: CPT | Performed by: INTERNAL MEDICINE

## 2025-02-27 PROCEDURE — 36415 COLL VENOUS BLD VENIPUNCTURE: CPT | Performed by: INTERNAL MEDICINE

## 2025-02-27 PROCEDURE — 83735 ASSAY OF MAGNESIUM: CPT | Performed by: INTERNAL MEDICINE

## 2025-02-27 PROCEDURE — 25000003 PHARM REV CODE 250: Performed by: INTERNAL MEDICINE

## 2025-02-27 PROCEDURE — 21400001 HC TELEMETRY ROOM

## 2025-02-27 PROCEDURE — 81001 URINALYSIS AUTO W/SCOPE: CPT

## 2025-02-27 PROCEDURE — 25000003 PHARM REV CODE 250

## 2025-02-27 RX ORDER — POTASSIUM CHLORIDE 20 MEQ/1
40 TABLET, EXTENDED RELEASE ORAL ONCE
Status: COMPLETED | OUTPATIENT
Start: 2025-02-27 | End: 2025-02-27

## 2025-02-27 RX ORDER — LOPERAMIDE HYDROCHLORIDE 2 MG/1
2 CAPSULE ORAL 4 TIMES DAILY PRN
Status: DISCONTINUED | OUTPATIENT
Start: 2025-02-27 | End: 2025-03-07 | Stop reason: HOSPADM

## 2025-02-27 RX ORDER — POTASSIUM CHLORIDE 7.45 MG/ML
20 INJECTION INTRAVENOUS ONCE
Status: DISCONTINUED | OUTPATIENT
Start: 2025-02-27 | End: 2025-02-27

## 2025-02-27 RX ORDER — POTASSIUM CHLORIDE 14.9 MG/ML
20 INJECTION INTRAVENOUS ONCE
Status: COMPLETED | OUTPATIENT
Start: 2025-02-27 | End: 2025-02-27

## 2025-02-27 RX ADMIN — VANCOMYCIN HYDROCHLORIDE 750 MG: 750 INJECTION, POWDER, LYOPHILIZED, FOR SOLUTION INTRAVENOUS at 05:02

## 2025-02-27 RX ADMIN — OXYCODONE AND ACETAMINOPHEN 1 TABLET: 10; 325 TABLET ORAL at 08:02

## 2025-02-27 RX ADMIN — FERROUS SULFATE TAB 325 MG (65 MG ELEMENTAL FE) 1 EACH: 325 (65 FE) TAB at 11:02

## 2025-02-27 RX ADMIN — FAMOTIDINE 40 MG: 20 TABLET, FILM COATED ORAL at 11:02

## 2025-02-27 RX ADMIN — ENOXAPARIN SODIUM 40 MG: 40 INJECTION SUBCUTANEOUS at 05:02

## 2025-02-27 RX ADMIN — OXYBUTYNIN CHLORIDE 5 MG: 5 TABLET ORAL at 08:02

## 2025-02-27 RX ADMIN — ALPRAZOLAM 0.5 MG: 0.5 TABLET ORAL at 08:02

## 2025-02-27 RX ADMIN — POTASSIUM CHLORIDE 20 MEQ: 14.9 INJECTION, SOLUTION INTRAVENOUS at 12:02

## 2025-02-27 RX ADMIN — POTASSIUM CHLORIDE 40 MEQ: 1500 TABLET, EXTENDED RELEASE ORAL at 11:02

## 2025-02-27 RX ADMIN — CETIRIZINE HYDROCHLORIDE 10 MG: 10 TABLET, FILM COATED ORAL at 11:02

## 2025-02-27 RX ADMIN — SODIUM CHLORIDE: 9 INJECTION, SOLUTION INTRAVENOUS at 07:02

## 2025-02-27 NOTE — SUBJECTIVE & OBJECTIVE
Review of Systems   Constitutional:  Negative for chills and fever.   Respiratory: Negative.     Cardiovascular: Negative.    Gastrointestinal:  Positive for diarrhea. Negative for abdominal distention, abdominal pain, nausea and vomiting.   Genitourinary: Negative.    Neurological: Negative.    All other systems reviewed and are negative.    Objective:     Vital Signs (Most Recent):  Temp: 100 °F (37.8 °C) (02/27/25 0726)  Pulse: 91 (02/27/25 0726)  Resp: 16 (02/27/25 0416)  BP: 109/69 (02/27/25 0726)  SpO2: 96 % (02/27/25 0726) Vital Signs (24h Range):  Temp:  [98.7 °F (37.1 °C)-102.8 °F (39.3 °C)] 100 °F (37.8 °C)  Pulse:  [] 91  Resp:  [16-20] 16  SpO2:  [96 %-98 %] 96 %  BP: ()/(63-70) 109/69     Weight: 52.2 kg (115 lb 1.3 oz)  Body mass index is 18.58 kg/m².    Intake/Output Summary (Last 24 hours) at 2/27/2025 1004  Last data filed at 2/26/2025 1126  Gross per 24 hour   Intake 360 ml   Output 457 ml   Net -97 ml         Physical Exam  Constitutional:       Appearance: Normal appearance.   Cardiovascular:      Rate and Rhythm: Normal rate and regular rhythm.   Pulmonary:      Effort: Pulmonary effort is normal.      Breath sounds: Normal breath sounds.   Abdominal:      General: Bowel sounds are normal.      Palpations: Abdomen is soft.      Tenderness: There is abdominal tenderness (mild throughout).   Skin:     General: Skin is warm and dry.   Neurological:      General: No focal deficit present.      Mental Status: She is alert and oriented to person, place, and time.             Significant Labs: All pertinent labs within the past 24 hours have been reviewed.    Significant Imaging: I have reviewed all pertinent imaging results/findings within the past 24 hours.

## 2025-02-27 NOTE — PLAN OF CARE
Updates sent to [facility] via [employer]. F/u with Emely in admissions and confirmed they will submit for auth once pt is medically stable.     Tomasa Carson LCSW

## 2025-02-27 NOTE — HPI
Lauren iRng is a 64 y.o. 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 The patient presented to [facility] on 2/21/2025 with a primary complaint of pain to left hip after falling from bed around 2 weeks ago. Patient was seen at emergency room [facility] and diagnosed with displaced left femoral neck fracture with a shortening. Patient has been seen by Orthopedics with plans for left hip arthroplasty tomorrow. Additional history includes hepatitis-C treated as well as lung cancer status post lobectomy. Patient is supposed to be on O2 but not currently. We will go ahead and place her on tele. Patient voices no complaints. No distress and pain appears to be controlled. Physical examination her left leg is shortened and laterally displaced.

## 2025-02-27 NOTE — PT/OT/SLP PROGRESS
Per RN, pt with critically low potassium. Not appropriate for therapy at this time. Will hold and f/u tomorrow.

## 2025-02-27 NOTE — PROGRESS NOTES
[facility] Hospital Medicine  Progress Note    Patient Name: Lauren Ring  MRN: [id number]  Patient Class: IP- Inpatient   Admission Date: 2/21/2025  Length of Stay: 6 days  Attending Physician: Agustín Steinberg MD  Primary Care Provider: Jose Juan Dunn MD        Subjective     Principal Problem:<principal problem not specified>        HPI:  Lauren Ring is a 64 y.o. 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 The patient presented to [facility] on 2/21/2025 with a primary complaint of pain to left hip after falling from bed around 2 weeks ago. Patient was seen at emergency room [facility] and diagnosed with displaced left femoral neck fracture with a shortening. Patient has been seen by Orthopedics with plans for left hip arthroplasty tomorrow. Additional history includes hepatitis-C treated as well as lung cancer status post lobectomy. Patient is supposed to be on O2 but not currently. We will go ahead and place her on tele. Patient voices no complaints. No distress and pain appears to be controlled. Physical examination her left leg is shortened and laterally displaced.     Overview/Hospital Course:  Patient seen with CNA at the bedside. She is having diarrhea today and says she is hungry but no other issues. Pain is moderate in the hip.        Review of Systems   Constitutional:  Negative for chills and fever.   Respiratory: Negative.     Cardiovascular: Negative.    Gastrointestinal:  Positive for diarrhea. Negative for abdominal distention, abdominal pain,  nausea and vomiting.   Genitourinary: Negative.    Neurological: Negative.    All other systems reviewed and are negative.    Objective:     Vital Signs (Most Recent):  Temp: 100 °F (37.8 °C) (02/27/25 0726)  Pulse: 91 (02/27/25 0726)  Resp: 16 (02/27/25 0416)  BP: 109/69 (02/27/25 0726)  SpO2: 96 % (02/27/25 0726) Vital Signs (24h Range):  Temp:  [98.7 °F (37.1 °C)-102.8 °F (39.3 °C)] 100 °F (37.8 °C)  Pulse:  [] 91  Resp:  [16-20] 16  SpO2:  [96 %-98 %] 96 %  BP: ()/(63-70) 109/69     Weight: 52.2 kg (115 lb 1.3 oz)  Body mass index is 18.58 kg/m².    Intake/Output Summary (Last 24 hours) at 2/27/2025 1004  Last data filed at 2/26/2025 1126  Gross per 24 hour   Intake 360 ml   Output 457 ml   Net -97 ml         Physical Exam  Constitutional:       Appearance: Normal appearance.   Cardiovascular:      Rate and Rhythm: Normal rate and regular rhythm.   Pulmonary:      Effort: Pulmonary effort is normal.      Breath sounds: Normal breath sounds.   Abdominal:      General: Bowel sounds are normal.      Palpations: Abdomen is soft.      Tenderness: There is abdominal tenderness (mild throughout).   Skin:     General: Skin is warm and dry.   Neurological:      General: No focal deficit present.      Mental Status: She is alert and oriented to person, place, and time.             Significant Labs: All pertinent labs within the past 24 hours have been reviewed.    Significant Imaging: I have reviewed all pertinent imaging results/findings within the past 24 hours.    Assessment and Plan     1.-fall from bed   2.-left femoral neck fracture with shortening  -status post left hip hemiarthroplasty 02/22/2025  - percocet 10/325 mgs po q 6 hours for pain / morphine 2 mgs for breakthrough   3-hypokalemia  4.-nausea/vomiting/diarrhea/abdominal pain  -vancomycin initiated     5.-fever  -continue cooling blankets/ofirmev/pancultured     6.-right upper extremity medial aspect induration with erythema   -right upper  extremity ultrasound     7.-left hip surgical site with mild erythema/induration  -CT abdomen and pelvis without contrast we will follow     8. -diarrhea  -fecal leukocytes/stool cultures        Replacing potassium again. Allow the patient to eat and start some immodium for her diarrhea. Stool culture pending. I believe her diarhhea is secondary to her ileus rather than infectious but will follow. She continues on Vancomycin while cultures are pending. Leukocytosis back to normal. If better by tomorrow, and cultures are negative, could be discharged then.        History of COPD/emphysema/lung cancer/lobectomy/hepatitis-C treated/anxiety/depression        PT/OT/ for discharge planning-snf    No notes have been filed under this hospital service.  Service: Hospital Medicine    VTE Risk Mitigation (From admission, onward)           Ordered     enoxaparin injection 40 mg  Every 24 hours         02/23/25 9234                    Discharge Planning   SYMONE:      Code Status: Prior   Medical Readiness for Discharge Date:   Discharge Plan A: Rehab                Please place Justification for DME        Renée Lemons MD  Department of Hospital Medicine   [facility]

## 2025-02-27 NOTE — PT/OT/SLP PROGRESS
Attempted to see pt for PT tx. NSG requested PT hold at this time 2/2 pt with critical potassium. PT to f/u as schedule allows.

## 2025-02-27 NOTE — HOSPITAL COURSE
Patient seen with CNA at the bedside. She is having diarrhea today and says she is hungry but no other issues. Pain is moderate in the hip.    2/28/25-No changes today.  Unsure of why she is on IV Vanco.  All cultures have been negative. It will be stopped.  Will also add probiotics for her diarrhea if not already started.

## 2025-02-28 PROBLEM — S72.002A CLOSED FRACTURE OF NECK OF LEFT FEMUR: Status: ACTIVE | Noted: 2025-02-28

## 2025-02-28 PROBLEM — K56.7 ILEUS: Status: ACTIVE | Noted: 2025-02-28

## 2025-02-28 LAB
ANION GAP SERPL CALC-SCNC: 7 MEQ/L
BACTERIA STL CULT: NORMAL
BASOPHILS # BLD AUTO: 0.05 X10(3)/MCL
BASOPHILS NFR BLD AUTO: 0.8 %
BUN SERPL-MCNC: 3.5 MG/DL (ref 9.8–20.1)
CALCIUM SERPL-MCNC: 7.4 MG/DL (ref 8.4–10.2)
CHLORIDE SERPL-SCNC: 118 MMOL/L (ref 98–107)
CO2 SERPL-SCNC: 20 MMOL/L (ref 23–31)
CREAT SERPL-MCNC: 0.54 MG/DL (ref 0.55–1.02)
CREAT/UREA NIT SERPL: 6
EOSINOPHIL # BLD AUTO: 0.26 X10(3)/MCL (ref 0–0.9)
EOSINOPHIL NFR BLD AUTO: 4 %
ERYTHROCYTE [DISTWIDTH] IN BLOOD BY AUTOMATED COUNT: 18.8 % (ref 11.5–17)
GFR SERPLBLD CREATININE-BSD FMLA CKD-EPI: >60 ML/MIN/1.73/M2
GLUCOSE SERPL-MCNC: 82 MG/DL (ref 82–115)
HCT VFR BLD AUTO: 26.9 % (ref 37–47)
HGB BLD-MCNC: 8.3 G/DL (ref 12–16)
IMM GRANULOCYTES # BLD AUTO: 0.02 X10(3)/MCL (ref 0–0.04)
IMM GRANULOCYTES NFR BLD AUTO: 0.3 %
LYMPHOCYTES # BLD AUTO: 1.66 X10(3)/MCL (ref 0.6–4.6)
LYMPHOCYTES NFR BLD AUTO: 25.3 %
MCH RBC QN AUTO: 25.8 PG (ref 27–31)
MCHC RBC AUTO-ENTMCNC: 30.9 G/DL (ref 33–36)
MCV RBC AUTO: 83.5 FL (ref 80–94)
MONOCYTES # BLD AUTO: 1.38 X10(3)/MCL (ref 0.1–1.3)
MONOCYTES NFR BLD AUTO: 21.1 %
NEUTROPHILS # BLD AUTO: 3.18 X10(3)/MCL (ref 2.1–9.2)
NEUTROPHILS NFR BLD AUTO: 48.5 %
NRBC BLD AUTO-RTO: 0 %
PLATELET # BLD AUTO: 293 X10(3)/MCL (ref 130–400)
PMV BLD AUTO: 10 FL (ref 7.4–10.4)
POTASSIUM SERPL-SCNC: 3.1 MMOL/L (ref 3.5–5.1)
RBC # BLD AUTO: 3.22 X10(6)/MCL (ref 4.2–5.4)
SODIUM SERPL-SCNC: 145 MMOL/L (ref 136–145)
WBC # BLD AUTO: 6.55 X10(3)/MCL (ref 4.5–11.5)

## 2025-02-28 PROCEDURE — 25000003 PHARM REV CODE 250: Performed by: INTERNAL MEDICINE

## 2025-02-28 PROCEDURE — 97530 THERAPEUTIC ACTIVITIES: CPT | Mod: CQ

## 2025-02-28 PROCEDURE — 36415 COLL VENOUS BLD VENIPUNCTURE: CPT | Performed by: INTERNAL MEDICINE

## 2025-02-28 PROCEDURE — 25000003 PHARM REV CODE 250

## 2025-02-28 PROCEDURE — 80048 BASIC METABOLIC PNL TOTAL CA: CPT | Performed by: INTERNAL MEDICINE

## 2025-02-28 PROCEDURE — 21400001 HC TELEMETRY ROOM

## 2025-02-28 PROCEDURE — 63600175 PHARM REV CODE 636 W HCPCS

## 2025-02-28 PROCEDURE — 97116 GAIT TRAINING THERAPY: CPT | Mod: CQ

## 2025-02-28 PROCEDURE — 85025 COMPLETE CBC W/AUTO DIFF WBC: CPT | Performed by: INTERNAL MEDICINE

## 2025-02-28 PROCEDURE — 63600175 PHARM REV CODE 636 W HCPCS: Performed by: INTERNAL MEDICINE

## 2025-02-28 PROCEDURE — 97535 SELF CARE MNGMENT TRAINING: CPT | Mod: CO

## 2025-02-28 RX ORDER — LANOLIN ALCOHOL/MO/W.PET/CERES
1 CREAM (GRAM) TOPICAL DAILY
Status: DISCONTINUED | OUTPATIENT
Start: 2025-02-28 | End: 2025-03-07 | Stop reason: HOSPADM

## 2025-02-28 RX ORDER — POTASSIUM CHLORIDE 20 MEQ/1
20 TABLET, EXTENDED RELEASE ORAL ONCE
Status: COMPLETED | OUTPATIENT
Start: 2025-02-28 | End: 2025-02-28

## 2025-02-28 RX ORDER — SELENIUM 50 MCG
1 TABLET ORAL
Status: DISCONTINUED | OUTPATIENT
Start: 2025-02-28 | End: 2025-03-04

## 2025-02-28 RX ADMIN — VANCOMYCIN HYDROCHLORIDE 750 MG: 750 INJECTION, POWDER, LYOPHILIZED, FOR SOLUTION INTRAVENOUS at 06:02

## 2025-02-28 RX ADMIN — POTASSIUM CHLORIDE 20 MEQ: 1500 TABLET, EXTENDED RELEASE ORAL at 09:02

## 2025-02-28 RX ADMIN — SODIUM CHLORIDE: 9 INJECTION, SOLUTION INTRAVENOUS at 10:02

## 2025-02-28 RX ADMIN — OXYBUTYNIN CHLORIDE 5 MG: 5 TABLET ORAL at 09:02

## 2025-02-28 RX ADMIN — TIOTROPIUM BROMIDE INHALATION SPRAY 2 PUFF: 3.12 SPRAY, METERED RESPIRATORY (INHALATION) at 04:02

## 2025-02-28 RX ADMIN — CETIRIZINE HYDROCHLORIDE 10 MG: 10 TABLET, FILM COATED ORAL at 09:02

## 2025-02-28 RX ADMIN — Medication 1 CAPSULE: at 04:02

## 2025-02-28 RX ADMIN — OXYCODONE AND ACETAMINOPHEN 1 TABLET: 10; 325 TABLET ORAL at 09:02

## 2025-02-28 RX ADMIN — FAMOTIDINE 40 MG: 20 TABLET, FILM COATED ORAL at 09:02

## 2025-02-28 RX ADMIN — SODIUM CHLORIDE: 9 INJECTION, SOLUTION INTRAVENOUS at 03:02

## 2025-02-28 RX ADMIN — ALPRAZOLAM 0.5 MG: 0.5 TABLET ORAL at 09:02

## 2025-02-28 RX ADMIN — FERROUS SULFATE TAB 325 MG (65 MG ELEMENTAL FE) 1 EACH: 325 (65 FE) TAB at 09:02

## 2025-02-28 RX ADMIN — OXYBUTYNIN CHLORIDE 5 MG: 5 TABLET ORAL at 03:02

## 2025-02-28 RX ADMIN — ENOXAPARIN SODIUM 40 MG: 40 INJECTION SUBCUTANEOUS at 04:02

## 2025-02-28 RX ADMIN — SODIUM CHLORIDE: 9 INJECTION, SOLUTION INTRAVENOUS at 01:02

## 2025-02-28 NOTE — PLAN OF CARE
Updates sent to \[facility]\"". Plan for them to submit for auth once pt is medically stable.     Tomasa Carson LCSW

## 2025-02-28 NOTE — CONSULTS
Inpatient Nutrition Assessment    Admit Date: 2/21/2025   Total duration of encounter: 7 days   Patient Age: 64 y.o.    Nutrition Recommendation/Prescription     -Continue Regular Diet as tolerated.   -Strawberry Boost VHC TID for additional nourishment; provides 530 kcal and 22 gm protein per container.   -Consider an appetite stimulant as medically feasible.   -Monitor wt, labs, and intake.     Communication of Recommendations: reviewed with patient    Nutrition Assessment     Malnutrition Assessment/Nutrition-Focused Physical Exam    Malnutrition Context: chronic illness (02/28/25 1445)  Malnutrition Level: severe (02/28/25 1445)  Energy Intake (Malnutrition): less than or equal to 75% for greater than or equal to 1 month (02/28/25 1445)  Weight Loss (Malnutrition): other (see comments) (Does not meet criteria) (02/28/25 1445)  Subcutaneous Fat (Malnutrition): severe depletion (02/28/25 1445)  Orbital Region (Subcutaneous Fat Loss): severe depletion  Upper Arm Region (Subcutaneous Fat Loss): severe depletion     Muscle Mass (Malnutrition): severe depletion (02/28/25 1445)  [place] Region (Muscle Loss): severe depletion  Clavicle Bone Region (Muscle Loss): severe depletion        Dorsal Hand (Muscle Loss): severe depletion           Fluid Accumulation (Malnutrition): other (see comments) (Not present) (02/28/25 1445)        A minimum of two characteristics is recommended for diagnosis of either severe or non-severe malnutrition.    Chart Review    Reason Seen: malnutrition screening tool (MST)    Malnutrition Screening Tool Results   Have you recently lost weight without trying?: No  Have you been eating poorly because of a decreased appetite?: No   MST Score: 0   Diagnosis:  1.-fall from bed   2.-left femoral neck fracture with shortening  -status post left hip hemiarthroplasty 02/22/2025  - will add percocet 10/325 mgs po q 6 hours for pain / morphine 2 mgs for breakthrough   3-hypokalemia-replaced    Relevant  Medical History: Anxiety, Cataract (7/26/2023), Closed nondisplaced pilon fracture of right tibia with routine healing, COPD (chronic obstructive pulmonary disease), Depression, Draining cutaneous sinus tract (8/2/2022), Emphysema lung, History of lung cancer (10/4/2022), Lung cancer (2017), Maxillary sinusitis (12/15/2022), Osteomyelitis of right ankle (7/26/2023), and Primary malignant neoplasm of lung (7/26/2023)     Scheduled Medications:  cetirizine, 10 mg, Daily  enoxparin, 40 mg, Q24H (prophylaxis, 1700)  famotidine, 40 mg, Daily  ferrous sulfate, 1 tablet, Daily  fluticasone furoate-vilanteroL, 1 puff, Daily  Lactobacillus acidophilus, 1 capsule, TID WM  oxybutynin, 5 mg, TID  tiotropium bromide, 2 puff, Daily    Continuous Infusions:  0.9% NaCl, Last Rate: 125 mL/hr at 02/28/25 1324    PRN Medications:  acetaminophen, 1,000 mg, Q6H PRN  albuterol, 2 puff, Q6H PRN  albuterol-ipratropium, 3 mL, TID PRN  ALPRAZolam, 0.5 mg, TID PRN  diphenhydrAMINE, 25 mg, Q6H PRN  loperamide, 2 mg, QID PRN  morphine, 2 mg, Q4H PRN  ondansetron, 4 mg, Q4H PRN  oxyCODONE-acetaminophen, 1 tablet, Q6H PRN  sodium chloride 0.9%, 10 mL, Q12H PRN    Calorie Containing IV Medications: no significant kcals from medications at this time    Recent Labs   Lab 02/22/25  0446 02/23/25  0433 02/25/25  0452 02/26/25  0544 02/27/25  0750 02/28/25  0402    136 140  --  140 145   K 3.9 4.0 4.2  --  2.5* 3.1*   CALCIUM 8.7 8.5 8.5  --  7.4* 7.4*   MG 1.80 1.80 1.70  --  1.60  --     103 105  --  111* 118*   CO2 27 23 27  --  22* 20*   BUN 6.8* 6.4* 6.3*  --  6.8* 3.5*   CREATININE 0.64 0.70 0.60  --  0.55 0.54*   EGFRNORACEVR >60 >60 >60  --  >60 >60   GLUCOSE 109 162* 111  --  94 82   BILITOT 0.3 0.3 0.3  --  0.4  --    ALKPHOS 79 83 71  --  68  --    ALT 12 15 12  --  14  --    AST 22 26 23  --  26  --    ALBUMIN 2.6* 2.7* 2.2*  --  1.9*  --    WBC 7.85 15.37* 9.18 11.77* 7.08 6.55   HGB 10.9* 10.3* 8.2* 9.3* 8.0* 8.3*   HCT 35.9*  "34.1* 26.4* 30.3* 25.6* 26.9*     Nutrition Orders:  Diet Adult Regular Standard Tray  Dietary nutrition supplements TID; Boost Very High Calorie Nutritional Drink - Strawberry    Appetite/Oral Intake: fair/not applicable  Factors Affecting Nutritional Intake: constipation and decreased appetite  Social Needs Impacting Access to Food: none identified  Food/Pentecostal/Cultural Preferences: none reported  Food Allergies: no known food allergies  Last Bowel Movement: 25  Wound(s):  surgical incision     Comments    25: Pt reports decreased appetite/intake for the past year with wt loss; pt denies n/v; pt receptive to oral supplement with meals.     25 Pt had been NPO off and on for 2 days, advanced to regular diet today, pt reports being hungry and asking for oral supplements with meals to continue.    Anthropometrics    Height: 5' 5.98" (167.6 cm),    Last Weight: 52.2 kg (115 lb 1.3 oz) (25 1013), Weight Method: Standard Scale  BMI (Calculated): 18.6  BMI Classification: normal (BMI 18.5-24.9)     Ideal Body Weight (IBW), Female: 129.9 lb     % Ideal Body Weight, Female (lb): 88.59 %                    Usual Body Weight (UBW), k.36 kg  % Usual Body Weight: 85.25  % Weight Change From Usual Weight: -14.93 %  Usual Weight Provided By: patient    Wt Readings from Last 5 Encounters:   25 52.2 kg (115 lb 1.3 oz)   25 49 kg (108 lb)   24 53.5 kg (118 lb)   23 52.6 kg (116 lb)   23 51.7 kg (114 lb)     Weight Change(s) Since Admission:   Wt Readings from Last 1 Encounters:   25 1013 52.2 kg (115 lb 1.3 oz)   25 1728 52.2 kg (115 lb 1.3 oz)   25 1249 52.2 kg (115 lb)   Admit Weight: 52.2 kg (115 lb) (25 1249), Weight Method: Standard Scale    Estimated Needs    Weight Used For Calorie Calculations: 52.2 kg (115 lb 1.3 oz)  Energy Calorie Requirements (kcal): 4475-2459 kcal (30-35 kcal/kg)  Energy Need Method: Kcal/kg  Weight Used For Protein " Calculations: 52.2 kg (115 lb 1.3 oz)  Protein Requirements: 78 gm (1.5g/kg)  Fluid Requirements (mL): 1566 mL        Enteral Nutrition     Patient not receiving enteral nutrition at this time.    Parenteral Nutrition     Patient not receiving parenteral nutrition support at this time.    Evaluation of Received Nutrient Intake    Calories: not meeting estimated needs  Protein: not meeting estimated needs    Patient Education     Not applicable.    Nutrition Diagnosis     PES: Inadequate oral intake related to chronic illness as evidenced by pt report of poor appetite/intake x 1 year. (active)     PES: Severe chronic disease or condition related malnutrition Related to chronic illness, suboptimal po intake As Evidenced by:  - energy intake: <= 75% for 12 months (meets criteria for <= 75% >= 1 month (severe - chronic)) - muscle mass depletion: 5 areas of severe muscle loss (Clavicle, Pectoralis, Temporalis, Interosseous, Trapezius) - loss of subcutaneous fat: 3 areas of severe fat loss (Triceps Skinfold, Infraorbital, Buccal) active    Nutrition Interventions     Intervention(s): general/healthful diet, commercial beverage, prescription medication, and collaboration with other providers  Intervention(s): Oral nutritional supplement    Goal: Meet greater than 80% of nutritional needs by follow-up. (goal progressing)  Goal: Maintain weight throughout hospitalization. (goal progressing)    Nutrition Goals & Monitoring     Dietitian will monitor: energy intake and weight  Discharge planning: continue Regular diet with Boost Plus oral supplements  Nutrition Risk/Follow-Up: high (follow-up in 1-4 days)   Please consult if re-assessment needed sooner.

## 2025-02-28 NOTE — ASSESSMENT & PLAN NOTE
Patient has Post-operative ileus which is adynamic in etiology which is improving. This is inherent to her procedure. Will treat conservatively with bowel rest, IV fluids, serial abdominal exams and avoidance of GI paralytics such as narcotics or anti-spasmodics. Monitor patient closely.     Start clear liquids today

## 2025-02-28 NOTE — PLAN OF CARE
Problem: Adult Inpatient Plan of Care  Goal: Plan of Care Review  Outcome: Progressing  Flowsheets (Taken 2/27/2025 2008)  Plan of Care Reviewed With: patient  Goal: Patient-Specific Goal (Individualized)  Outcome: Progressing  Goal: Absence of Hospital-Acquired Illness or Injury  Outcome: Progressing  Intervention: Identify and Manage Fall Risk  Flowsheets (Taken 2/27/2025 2008)  Safety Promotion/Fall Prevention:   assistive device/personal item within reach   family to remain at bedside   medications reviewed   side rails raised x 2   nonskid shoes/socks when out of bed   instructed to call staff for mobility   lighting adjusted  Intervention: Prevent Skin Injury  Flowsheets (Taken 2/27/2025 2008)  Body Position: turned  Skin Protection: incontinence pads utilized  Device Skin Pressure Protection:   absorbent pad utilized/changed   positioning supports utilized  Intervention: Prevent and Manage VTE (Venous Thromboembolism) Risk  Flowsheets (Taken 2/27/2025 2008)  VTE Prevention/Management:   ROM (active) performed   ROM (passive) performed   fluids promoted  Intervention: Prevent Infection  Flowsheets (Taken 2/27/2025 2008)  Infection Prevention: hand hygiene promoted  Goal: Optimal Comfort and Wellbeing  Outcome: Progressing  Intervention: Monitor Pain and Promote Comfort  Flowsheets (Taken 2/27/2025 2008)  Pain Management Interventions:   care clustered   medication offered  Goal: Readiness for Transition of Care  Outcome: Progressing     Problem: Fall Injury Risk  Goal: Absence of Fall and Fall-Related Injury  Outcome: Progressing  Intervention: Identify and Manage Contributors  Flowsheets (Taken 2/27/2025 2008)  Medication Review/Management: medications reviewed  Intervention: Promote Injury-Free Environment  Flowsheets (Taken 2/27/2025 2008)  Safety Promotion/Fall Prevention:   assistive device/personal item within reach   family to remain at bedside   medications reviewed   side rails raised x 2   nonskid  shoes/socks when out of bed   instructed to call staff for mobility   lighting adjusted     Problem: Pain Acute  Goal: Optimal Pain Control and Function  Outcome: Progressing     Problem: Wound  Goal: Optimal Coping  Outcome: Progressing  Goal: Optimal Functional Ability  Outcome: Progressing  Goal: Absence of Infection Signs and Symptoms  Outcome: Progressing  Goal: Improved Oral Intake  Outcome: Progressing  Goal: Optimal Pain Control and Function  Outcome: Progressing  Goal: Skin Health and Integrity  Outcome: Progressing  Goal: Optimal Wound Healing  Outcome: Progressing     Problem: Infection  Goal: Absence of Infection Signs and Symptoms  Outcome: Progressing

## 2025-02-28 NOTE — ASSESSMENT & PLAN NOTE
Nutrition consulted. Most recent weight and BMI monitored-     Measurements:  Wt Readings from Last 1 Encounters:   02/24/25 52.2 kg (115 lb 1.3 oz)   Body mass index is 18.58 kg/m².    Patient has been screened and assessed by RD.    Malnutrition Type:  Context: chronic illness  Level: severe    Malnutrition Characteristic Summary:  Weight Loss (Malnutrition): other (see comments) (Does not meet criteria)  Energy Intake (Malnutrition): less than or equal to 75% for greater than or equal to 1 month  Subcutaneous Fat (Malnutrition): severe depletion  Muscle Mass (Malnutrition): severe depletion  Fluid Accumulation (Malnutrition): other (see comments) (Not present)    Interventions/Recommendations (treatment strategy):  Oral nutritional supplement

## 2025-02-28 NOTE — PT/OT/SLP PROGRESS
Physical Therapy Treatment    Patient Name:  Lauren Ring   MRN:  [id number]    Recommendations:     Discharge therapy intensity: Moderate Intensity Therapy   Discharge Equipment Recommendations: to be determined by next level of care  Barriers to discharge: Inaccessible home, Decreased caregiver support, and Impaired mobility    Assessment:     Lauren Ring is a 64 y.o. female admitted with a medical diagnosis of .  She presents with the following impairments/functional limitations: weakness, impaired endurance, impaired self care skills, impaired functional mobility, gait instability, impaired balance, decreased safety awareness, pain L displaced femoral neck fx following a fall 2 weeks ago; now s/p L hip hemiarthroplasty on 2/22. .      Rehab Prognosis: Good; patient would benefit from acute skilled PT services to address these deficits and reach maximum level of function.    Recent Surgery: Procedure(s) (LRB):  HEMIARTHROPLASTY, HIP, POSTERIOR APPROACH (Left) 6 Days Post-Op    Plan:     During this hospitalization, patient would benefit from acute PT services 6 x/week to address the identified rehab impairments via gait training, therapeutic activities, therapeutic exercises, neuromuscular re-education and progress toward the following goals:    Plan of Care Expires:  03/23/25    Subjective     Chief Complaint:   Patient/Family Comments/goals:   Pain/Comfort:  Location - Side 1: Left  Location 1: hip  Pain Addressed 1: Reposition, Distraction      Objective:     Communicated with NSG prior to session.  Patient found HOB elevated with PureWick upon PT entry to room.     General Precautions: Standard, fall  Orthopedic Precautions: LLE posterior precautions, LLE weight bearing as tolerated  Braces: N/A  Respiratory Status: Nasal cannula, flow 2 L/min  Blood Pressure:   Skin Integrity: Visible skin intact      Functional Mobility:  Bed Mobility:     Scooting: minimum assistance  Supine to Sit: minimum  assistance  Transfers:     Sit to Stand:  minimum assistance with rolling walker and 1 trial from EOB,1 trial from bedside chair   Bed to Chair: minimum assistance with  rolling walker  using  Step Transfer  Gait: pt amb 12ft with RW Jose. Pt with unsteady gait pattern throughout and presents with short step-through gait pattern.   Dyn standing: pt able to reach outside her PIPPA and across midline while brushing teeth. Pt Jose for balance. Unsteady throughout.       Patient left up in chair with all lines intact and call button in reach    GOALS:   Multidisciplinary Problems       Physical Therapy Goals          Problem: Physical Therapy    Goal Priority Disciplines Outcome Interventions   Physical Therapy Goal     PT, PT/OT Progressing    Description: Goals to be met by: 3/23/25     Patient will increase functional independence with mobility by performin. Supine to sit with Set-up Selby  2. Sit to supine with Set-up Selby  3. Sit to stand transfer with Supervision  4. Bed to chair transfer with Supervision using Rolling Walker  5. Gait  x 200 feet with Supervision using Rolling Walker.                            Time Tracking:     PT Received On: 25  PT Start Time: 0949     PT Stop Time: 1013  PT Total Time (min): 24 min     Billable Minutes: Gait Training 8 and Therapeutic Activity 14    Treatment Type: Treatment  PT/PTA: PTA     Number of PTA visits since last PT visit: 3     2025

## 2025-02-28 NOTE — PT/OT/SLP PROGRESS
Occupational Therapy   Treatment    Name: Lauren Ring  MRN: [id number]    Recommendations:     Recommended therapy intensity at discharge: Moderate Intensity Therapy   Discharge Equipment Recommendations:  to be determined by next level of care  Barriers to discharge:       Assessment:     Lauren Ring is a 64 y.o. female with a medical diagnosis of L displaced femoral neck fx following a fall 2 weeks ago; now s/p L hip hemiarthroplasty on 2/22. Performance deficits affecting function are weakness, impaired endurance, impaired self care skills, impaired functional mobility, gait instability, impaired balance, decreased safety awareness, decreased lower extremity function, decreased upper extremity function, orthopedic precautions.     Rehab Prognosis:  Good; patient would benefit from acute skilled OT services to address these deficits and reach maximum level of function.       Plan:     Patient to be seen 6 x/week to address the above listed problems via self-care/home management, therapeutic activities, therapeutic exercises  Plan of Care Expires: 03/23/25  Plan of Care Reviewed with: patient    Subjective     Pain/Comfort:  Location - Side 1: Left  Location 1: hip  Pain Addressed 1: Reposition, Distraction    Objective:     Communicated with: RN prior to session.  Patient found HOB elevated with PureWick upon OT entry to room.    General Precautions: Standard, fall    Orthopedic Precautions:LLE posterior precautions, LLE weight bearing as tolerated  Braces: N/A  Respiratory Status: Nasal cannula, flow 2 L/min     Occupational Performance:     Bed Mobility:    Patient completed Scooting/Bridging with minimum assistance  Patient completed Supine to Sit with minimum assistance     Functional Mobility/Transfers:  Patient completed Sit <> Stand Transfer with minimum assistance  with  rolling walker   Patient completed Bed <> Chair Transfer using Step Transfer technique with minimum assistance with rolling  walker  Functional Mobility: pt ambulated to sink in room with Min A and RW. No LOB.     Activities of Daily Living:  Grooming: pt completed oral hygiene standing at sink with CGA for standing balance.     Therapeutic Positioning    OT interventions performed during the course of today's session in an effort to prevent and/or reduce acquired pressure injuries:   Education was provided on benefits of and recommendations for therapeutic positioning    [facility] 6 Click ADL: 19    Patient Education:  Patient provided with verbal education education regarding OT role/goals/POC, fall prevention, and safety awareness.  Understanding was verbalized.      Patient left up in chair with all lines intact and call button in reach.    GOALS:   Multidisciplinary Problems       Occupational Therapy Goals          Problem: Occupational Therapy    Goal Priority Disciplines Outcome Interventions   Occupational Therapy Goal     OT, PT/OT Progressing    Description: LTG: Pt will perform basic ADLs and ADL transfers with Modified independence using LRAD by discharge.    STG: to be met by 3/23/25    Pt will complete grooming standing at sink with LRAD with SBA.  Pt will complete UB dressing with SBA.  Pt will complete LB dressing with SBA using LRAD and AE prn.  Pt will complete toileting with SBA using LRAD.  Pt will complete functional mobility to/from toilet and toilet transfer with SBA using LRAD.                        Time Tracking:     OT Date of Treatment: 02/28/25  OT Start Time: 0950  OT Stop Time: 1013  OT Total Time (min): 23 min    Billable Minutes:Self Care/Home Management 23    OT/KIMBERLEY: KIMBERLEY     Number of KIMBERLEY visits since last OT visit: 3    2/28/2025

## 2025-02-28 NOTE — NURSING
"Confirmed with pharmcay that a vanc trough was not to be drawn prior to the schedule dose of vanc at 6 am this am. Pharmacist stated "continue current schedule and will draw trough trough as scheduled for 3/1/25 @ 0500." Informed pharmacist there hasn't been a trough r/t retiming of vanc, pharmacist informed to "still hang vanc, it was ok."   "

## 2025-02-28 NOTE — PLAN OF CARE
Problem: Adult Inpatient Plan of Care  Goal: Plan of Care Review  Outcome: Progressing  Goal: Patient-Specific Goal (Individualized)  Outcome: Progressing  Goal: Absence of Hospital-Acquired Illness or Injury  Outcome: Progressing  Intervention: Identify and Manage Fall Risk  Flowsheets (Taken 2/28/2025 1110)  Safety Promotion/Fall Prevention:   Fall Risk reviewed with patient/family   instructed to call staff for mobility   patient expresses understanding of fall risk and prevention  Intervention: Prevent Skin Injury  Flowsheets (Taken 2/28/2025 1110)  Body Position: turned  Skin Protection: incontinence pads utilized  Device Skin Pressure Protection:   absorbent pad utilized/changed   adhesive use limited  Goal: Optimal Comfort and Wellbeing  Outcome: Progressing

## 2025-02-28 NOTE — PROGRESS NOTES
[facility] Medicine  Progress Note    Patient Name: Lauren Ring  MRN: [id number]  Patient Class: IP- Inpatient   Admission Date: 2/21/2025  Length of Stay: 7 days  Attending Physician: Agustín Steinberg MD  Primary Care Provider: Jose Juan Dunn MD        Subjective     Principal Problem:Closed fracture of neck of left femur        HPI:  Lauren Ring is a 64 y.o. 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 The patient presented to [facility] on 2/21/2025 with a primary complaint of pain to left hip after falling from bed around 2 weeks ago. Patient was seen at emergency room [facility] and diagnosed with displaced left femoral neck fracture with a shortening. Patient has been seen by Orthopedics with plans for left hip arthroplasty tomorrow. Additional history includes hepatitis-C treated as well as lung cancer status post lobectomy. Patient is supposed to be on O2 but not currently. We will go ahead and place her on tele. Patient voices no complaints. No distress and pain appears to be controlled. Physical examination her left leg is shortened and laterally displaced.     Overview/Hospital Course:  Patient seen with CNA at the bedside. She is having diarrhea today and says she is hungry but no other issues. Pain is moderate in the hip.    2/28/25-No changes today.  Unsure of why she is on IV Vanco.  All cultures have been negative. It will be stopped.  Will also add probiotics for her diarrhea if not already started.      Interval History:     Review of  Systems   Constitutional:  Positive for activity change and fatigue.   HENT: Negative.     Eyes: Negative.    Respiratory: Negative.     Cardiovascular: Negative.    Gastrointestinal:  Positive for diarrhea.   Endocrine: Negative.    Genitourinary: Negative.    Musculoskeletal:  Positive for arthralgias and gait problem.   Skin: Negative.    Allergic/Immunologic: Negative.    Neurological:  Positive for weakness.   Hematological: Negative.    Psychiatric/Behavioral:  Positive for confusion.      Objective:     Vital Signs (Most Recent):  Temp: 98.3 °F (36.8 °C) (02/28/25 1113)  Pulse: 84 (02/28/25 1113)  Resp: 18 (02/28/25 1113)  BP: 98/61 (02/28/25 1113)  SpO2: 100 % (02/28/25 1113) Vital Signs (24h Range):  Temp:  [97.9 °F (36.6 °C)-98.7 °F (37.1 °C)] 98.3 °F (36.8 °C)  Pulse:  [78-89] 84  Resp:  [16-18] 18  SpO2:  [95 %-100 %] 100 %  BP: ()/(61-71) 98/61     Weight: 52.2 kg (115 lb 1.3 oz)  Body mass index is 18.58 kg/m².    Intake/Output Summary (Last 24 hours) at 2/28/2025 1412  Last data filed at 2/28/2025 0648  Gross per 24 hour   Intake 4899.25 ml   Output --   Net 4899.25 ml         Physical Exam  Constitutional:       General: She is awake.      Appearance: Normal appearance. She is normal weight.   HENT:      Head: Normocephalic and atraumatic.      Nose: Nose normal.      Mouth/Throat:      Mouth: Mucous membranes are moist.      Pharynx: Oropharynx is clear.   Eyes:      Extraocular Movements: Extraocular movements intact.      Conjunctiva/sclera: Conjunctivae normal.      Pupils: Pupils are equal, round, and reactive to light.   Cardiovascular:      Rate and Rhythm: Normal rate and regular rhythm.      Pulses: Normal pulses.      Heart sounds: Normal heart sounds.   Pulmonary:      Effort: Pulmonary effort is normal.      Breath sounds: Normal breath sounds.   Abdominal:      General: Bowel sounds are normal.      Palpations: Abdomen is soft.   Musculoskeletal:         General: Normal range of  motion.      Cervical back: Normal range of motion and neck supple.   Skin:     General: Skin is warm and dry.      Capillary Refill: Capillary refill takes 2 to 3 seconds.   Neurological:      General: No focal deficit present.      Mental Status: Mental status is at baseline.   Psychiatric:         Mood and Affect: Mood normal.         Thought Content: Thought content normal.             Significant Labs: All pertinent labs within the past 24 hours have been reviewed.  BMP:   Recent Labs   Lab 02/27/25  0750 02/28/25  0402    145   K 2.5* 3.1*   * 118*   CO2 22* 20*   BUN 6.8* 3.5*   CREATININE 0.55 0.54*   CALCIUM 7.4* 7.4*   MG 1.60  --      CBC:   Recent Labs   Lab 02/27/25  0750 02/28/25  0402   WBC 7.08 6.55   HGB 8.0* 8.3*   HCT 25.6* 26.9*    293     CMP:   Recent Labs   Lab 02/27/25  0750 02/28/25  0402    145   K 2.5* 3.1*   * 118*   CO2 22* 20*   BUN 6.8* 3.5*   CREATININE 0.55 0.54*   CALCIUM 7.4* 7.4*   ALBUMIN 1.9*  --    BILITOT 0.4  --    ALKPHOS 68  --    AST 26  --    ALT 14  --      Magnesium:   Recent Labs   Lab 02/27/25  0750   MG 1.60       Significant Imaging: I have reviewed all pertinent imaging results/findings within the past 24 hours.    Assessment and Plan     * Closed fracture of neck of left femur    S/p surgery  Therapy  Pain control  OOB  ?placement    Ileus  Patient has Post-operative ileus which is adynamic in etiology which is improving. This is inherent to her procedure. Will treat conservatively with bowel rest, IV fluids, serial abdominal exams and avoidance of GI paralytics such as narcotics or anti-spasmodics. Monitor patient closely.     Start clear liquids today    Severe malnutrition  Nutrition consulted. Most recent weight and BMI monitored-     Measurements:  Wt Readings from Last 1 Encounters:   02/24/25 52.2 kg (115 lb 1.3 oz)   Body mass index is 18.58 kg/m².    Patient has been screened and assessed by RD.    Malnutrition  Type:  Context: chronic illness  Level: severe    Malnutrition Characteristic Summary:  Weight Loss (Malnutrition): other (see comments) (Does not meet criteria)  Energy Intake (Malnutrition): less than or equal to 75% for greater than or equal to 1 month  Subcutaneous Fat (Malnutrition): severe depletion  Muscle Mass (Malnutrition): severe depletion  Fluid Accumulation (Malnutrition): other (see comments) (Not present)    Interventions/Recommendations (treatment strategy):  Oral nutritional supplement        VTE Risk Mitigation (From admission, onward)           Ordered     enoxaparin injection 40 mg  Every 24 hours         02/23/25 4215                  Therapy  OOB  DVT prophylaxis  Clear liquid and advance as tolerated  ?rehab  Discharge Planning   SYMONE:      Code Status: Prior   Medical Readiness for Discharge Date:   Discharge Plan A: Rehab                Please place Justification for DME        Nic Fernández MD  Department of Hospital Medicine   [facility]

## 2025-02-28 NOTE — PLAN OF CARE
Problem: Adult Inpatient Plan of Care  Goal: Plan of Care Review  Outcome: Progressing  Goal: Patient-Specific Goal (Individualized)  Outcome: Progressing  Goal: Absence of Hospital-Acquired Illness or Injury  Outcome: Progressing  Goal: Optimal Comfort and Wellbeing  Outcome: Progressing  Goal: Readiness for Transition of Care  Outcome: Progressing     Problem: Fall Injury Risk  Goal: Absence of Fall and Fall-Related Injury  Outcome: Progressing  Intervention: Identify and Manage Contributors  Flowsheets (Taken 2/28/2025 0506)  Self-Care Promotion:   independence encouraged   BADL personal objects within reach   BADL personal routines maintained   meal set-up provided  Medication Review/Management:   medications reviewed   high-risk medications identified  Intervention: Promote Injury-Free Environment  Flowsheets (Taken 2/28/2025 0506)  Safety Promotion/Fall Prevention:   assistive device/personal item within reach   bed alarm set   bedside commode chair   commode/urinal/bedpan at bedside   Fall Risk reviewed with patient/family   Fall Risk signage in place   high risk medications identified   instructed to call staff for mobility   lighting adjusted   medications reviewed   nonskid shoes/socks when out of bed   patient expresses understanding of fall risk and prevention   room near unit station     Problem: Pain Acute  Goal: Optimal Pain Control and Function  Outcome: Progressing  Intervention: Develop Pain Management Plan  Flowsheets (Taken 2/28/2025 0506)  Pain Management Interventions: around-the-clock dosing utilized  Intervention: Prevent or Manage Pain  Flowsheets (Taken 2/28/2025 0506)  Sleep/Rest Enhancement:   awakenings minimized   consistent schedule promoted  Bowel Elimination Promotion:   adequate fluid intake promoted   commode/bedpan at bedside  Medication Review/Management:   medications reviewed   high-risk medications identified  Intervention: Optimize Psychosocial Wellbeing  Flowsheets (Taken  2/28/2025 0506)  Supportive Measures: active listening utilized     Problem: Wound  Goal: Optimal Coping  Outcome: Progressing  Goal: Optimal Functional Ability  Outcome: Progressing  Goal: Absence of Infection Signs and Symptoms  Outcome: Progressing  Goal: Improved Oral Intake  Outcome: Progressing  Goal: Optimal Pain Control and Function  Outcome: Progressing  Goal: Skin Health and Integrity  Outcome: Progressing  Goal: Optimal Wound Healing  Outcome: Progressing     Problem: Infection  Goal: Absence of Infection Signs and Symptoms  Outcome: Progressing

## 2025-02-28 NOTE — SUBJECTIVE & OBJECTIVE
Interval History:     Review of Systems   Constitutional:  Positive for activity change and fatigue.   HENT: Negative.     Eyes: Negative.    Respiratory: Negative.     Cardiovascular: Negative.    Gastrointestinal:  Positive for diarrhea.   Endocrine: Negative.    Genitourinary: Negative.    Musculoskeletal:  Positive for arthralgias and gait problem.   Skin: Negative.    Allergic/Immunologic: Negative.    Neurological:  Positive for weakness.   Hematological: Negative.    Psychiatric/Behavioral:  Positive for confusion.      Objective:     Vital Signs (Most Recent):  Temp: 98.3 °F (36.8 °C) (02/28/25 1113)  Pulse: 84 (02/28/25 1113)  Resp: 18 (02/28/25 1113)  BP: 98/61 (02/28/25 1113)  SpO2: 100 % (02/28/25 1113) Vital Signs (24h Range):  Temp:  [97.9 °F (36.6 °C)-98.7 °F (37.1 °C)] 98.3 °F (36.8 °C)  Pulse:  [78-89] 84  Resp:  [16-18] 18  SpO2:  [95 %-100 %] 100 %  BP: ()/(61-71) 98/61     Weight: 52.2 kg (115 lb 1.3 oz)  Body mass index is 18.58 kg/m².    Intake/Output Summary (Last 24 hours) at 2/28/2025 1412  Last data filed at 2/28/2025 0648  Gross per 24 hour   Intake 4899.25 ml   Output --   Net 4899.25 ml         Physical Exam  Constitutional:       General: She is awake.      Appearance: Normal appearance. She is normal weight.   HENT:      Head: Normocephalic and atraumatic.      Nose: Nose normal.      Mouth/Throat:      Mouth: Mucous membranes are moist.      Pharynx: Oropharynx is clear.   Eyes:      Extraocular Movements: Extraocular movements intact.      Conjunctiva/sclera: Conjunctivae normal.      Pupils: Pupils are equal, round, and reactive to light.   Cardiovascular:      Rate and Rhythm: Normal rate and regular rhythm.      Pulses: Normal pulses.      Heart sounds: Normal heart sounds.   Pulmonary:      Effort: Pulmonary effort is normal.      Breath sounds: Normal breath sounds.   Abdominal:      General: Bowel sounds are normal.      Palpations: Abdomen is soft.   Musculoskeletal:          General: Normal range of motion.      Cervical back: Normal range of motion and neck supple.   Skin:     General: Skin is warm and dry.      Capillary Refill: Capillary refill takes 2 to 3 seconds.   Neurological:      General: No focal deficit present.      Mental Status: Mental status is at baseline.   Psychiatric:         Mood and Affect: Mood normal.         Thought Content: Thought content normal.             Significant Labs: All pertinent labs within the past 24 hours have been reviewed.  BMP:   Recent Labs   Lab 02/27/25  0750 02/28/25  0402    145   K 2.5* 3.1*   * 118*   CO2 22* 20*   BUN 6.8* 3.5*   CREATININE 0.55 0.54*   CALCIUM 7.4* 7.4*   MG 1.60  --      CBC:   Recent Labs   Lab 02/27/25  0750 02/28/25  0402   WBC 7.08 6.55   HGB 8.0* 8.3*   HCT 25.6* 26.9*    293     CMP:   Recent Labs   Lab 02/27/25  0750 02/28/25  0402    145   K 2.5* 3.1*   * 118*   CO2 22* 20*   BUN 6.8* 3.5*   CREATININE 0.55 0.54*   CALCIUM 7.4* 7.4*   ALBUMIN 1.9*  --    BILITOT 0.4  --    ALKPHOS 68  --    AST 26  --    ALT 14  --      Magnesium:   Recent Labs   Lab 02/27/25  0750   MG 1.60       Significant Imaging: I have reviewed all pertinent imaging results/findings within the past 24 hours.

## 2025-03-01 LAB
ABO + RH BLD: NORMAL
ABO + RH BLD: NORMAL
ALBUMIN SERPL-MCNC: 1.7 G/DL (ref 3.4–4.8)
ALBUMIN/GLOB SERPL: 0.6 RATIO (ref 1.1–2)
ALP SERPL-CCNC: 64 UNIT/L (ref 40–150)
ALT SERPL-CCNC: 11 UNIT/L (ref 0–55)
ANION GAP SERPL CALC-SCNC: 9 MEQ/L
AST SERPL-CCNC: 22 UNIT/L (ref 5–34)
BASOPHILS # BLD AUTO: 0.03 X10(3)/MCL
BASOPHILS NFR BLD AUTO: 0.4 %
BILIRUB SERPL-MCNC: 0.2 MG/DL
BLD PROD TYP BPU: NORMAL
BLD PROD TYP BPU: NORMAL
BLOOD UNIT EXPIRATION DATE: NORMAL
BLOOD UNIT EXPIRATION DATE: NORMAL
BLOOD UNIT TYPE CODE: 6200
BLOOD UNIT TYPE CODE: 6200
BUN SERPL-MCNC: <3 MG/DL (ref 9.8–20.1)
CALCIUM SERPL-MCNC: 7.5 MG/DL (ref 8.4–10.2)
CHLORIDE SERPL-SCNC: 114 MMOL/L (ref 98–107)
CO2 SERPL-SCNC: 19 MMOL/L (ref 23–31)
CREAT SERPL-MCNC: 0.56 MG/DL (ref 0.55–1.02)
CREAT/UREA NIT SERPL: <5
CROSSMATCH INTERPRETATION: NORMAL
CROSSMATCH INTERPRETATION: NORMAL
DISPENSE STATUS: NORMAL
DISPENSE STATUS: NORMAL
EOSINOPHIL # BLD AUTO: 0.27 X10(3)/MCL (ref 0–0.9)
EOSINOPHIL NFR BLD AUTO: 3.5 %
ERYTHROCYTE [DISTWIDTH] IN BLOOD BY AUTOMATED COUNT: 18.6 % (ref 11.5–17)
GFR SERPLBLD CREATININE-BSD FMLA CKD-EPI: >60 ML/MIN/1.73/M2
GLOBULIN SER-MCNC: 3 GM/DL (ref 2.4–3.5)
GLUCOSE SERPL-MCNC: 87 MG/DL (ref 82–115)
GROUP & RH: NORMAL
HCT VFR BLD AUTO: 24.5 % (ref 37–47)
HGB BLD-MCNC: 7.4 G/DL (ref 12–16)
IMM GRANULOCYTES # BLD AUTO: 0.03 X10(3)/MCL (ref 0–0.04)
IMM GRANULOCYTES NFR BLD AUTO: 0.4 %
INDIRECT COOMBS: NORMAL
LYMPHOCYTES # BLD AUTO: 1.56 X10(3)/MCL (ref 0.6–4.6)
LYMPHOCYTES NFR BLD AUTO: 20.1 %
MAGNESIUM SERPL-MCNC: 1.5 MG/DL (ref 1.6–2.6)
MCH RBC QN AUTO: 25.8 PG (ref 27–31)
MCHC RBC AUTO-ENTMCNC: 30.2 G/DL (ref 33–36)
MCV RBC AUTO: 85.4 FL (ref 80–94)
MONOCYTES # BLD AUTO: 1.11 X10(3)/MCL (ref 0.1–1.3)
MONOCYTES NFR BLD AUTO: 14.3 %
NEUTROPHILS # BLD AUTO: 4.76 X10(3)/MCL (ref 2.1–9.2)
NEUTROPHILS NFR BLD AUTO: 61.3 %
NRBC BLD AUTO-RTO: 0 %
PLATELET # BLD AUTO: 273 X10(3)/MCL (ref 130–400)
PMV BLD AUTO: 10.1 FL (ref 7.4–10.4)
POTASSIUM SERPL-SCNC: 2.6 MMOL/L (ref 3.5–5.1)
PROT SERPL-MCNC: 4.7 GM/DL (ref 5.8–7.6)
RBC # BLD AUTO: 2.87 X10(6)/MCL (ref 4.2–5.4)
SODIUM SERPL-SCNC: 142 MMOL/L (ref 136–145)
SPECIMEN OUTDATE: NORMAL
UNIT NUMBER: NORMAL
UNIT NUMBER: NORMAL
WBC # BLD AUTO: 7.76 X10(3)/MCL (ref 4.5–11.5)

## 2025-03-01 PROCEDURE — 86923 COMPATIBILITY TEST ELECTRIC: CPT | Mod: 91 | Performed by: INTERNAL MEDICINE

## 2025-03-01 PROCEDURE — 30233N1 TRANSFUSION OF NONAUTOLOGOUS RED BLOOD CELLS INTO PERIPHERAL VEIN, PERCUTANEOUS APPROACH: ICD-10-PCS | Performed by: ORTHOPAEDIC SURGERY

## 2025-03-01 PROCEDURE — 80053 COMPREHEN METABOLIC PANEL: CPT | Performed by: INTERNAL MEDICINE

## 2025-03-01 PROCEDURE — 76937 US GUIDE VASCULAR ACCESS: CPT

## 2025-03-01 PROCEDURE — 25000003 PHARM REV CODE 250: Performed by: INTERNAL MEDICINE

## 2025-03-01 PROCEDURE — C1751 CATH, INF, PER/CENT/MIDLINE: HCPCS

## 2025-03-01 PROCEDURE — 36410 VNPNXR 3YR/> PHY/QHP DX/THER: CPT

## 2025-03-01 PROCEDURE — P9016 RBC LEUKOCYTES REDUCED: HCPCS | Performed by: INTERNAL MEDICINE

## 2025-03-01 PROCEDURE — 97530 THERAPEUTIC ACTIVITIES: CPT | Mod: CQ

## 2025-03-01 PROCEDURE — 36430 TRANSFUSION BLD/BLD COMPNT: CPT

## 2025-03-01 PROCEDURE — 63600175 PHARM REV CODE 636 W HCPCS: Performed by: INTERNAL MEDICINE

## 2025-03-01 PROCEDURE — 36415 COLL VENOUS BLD VENIPUNCTURE: CPT | Performed by: INTERNAL MEDICINE

## 2025-03-01 PROCEDURE — 63600175 PHARM REV CODE 636 W HCPCS

## 2025-03-01 PROCEDURE — 21400001 HC TELEMETRY ROOM

## 2025-03-01 PROCEDURE — 86900 BLOOD TYPING SEROLOGIC ABO: CPT | Performed by: INTERNAL MEDICINE

## 2025-03-01 PROCEDURE — 83735 ASSAY OF MAGNESIUM: CPT | Performed by: INTERNAL MEDICINE

## 2025-03-01 PROCEDURE — 97116 GAIT TRAINING THERAPY: CPT | Mod: CQ

## 2025-03-01 PROCEDURE — 85025 COMPLETE CBC W/AUTO DIFF WBC: CPT | Performed by: INTERNAL MEDICINE

## 2025-03-01 PROCEDURE — 25000003 PHARM REV CODE 250

## 2025-03-01 RX ORDER — MAGNESIUM SULFATE 1 G/100ML
1 INJECTION INTRAVENOUS ONCE
Status: COMPLETED | OUTPATIENT
Start: 2025-03-01 | End: 2025-03-01

## 2025-03-01 RX ORDER — MAGNESIUM SULFATE 1 G/100ML
1 INJECTION INTRAVENOUS ONCE
Status: DISCONTINUED | OUTPATIENT
Start: 2025-03-01 | End: 2025-03-01

## 2025-03-01 RX ORDER — POTASSIUM CHLORIDE 14.9 MG/ML
20 INJECTION INTRAVENOUS
Status: COMPLETED | OUTPATIENT
Start: 2025-03-01 | End: 2025-03-01

## 2025-03-01 RX ORDER — POTASSIUM CHLORIDE 14.9 MG/ML
20 INJECTION INTRAVENOUS
Status: DISCONTINUED | OUTPATIENT
Start: 2025-03-01 | End: 2025-03-01

## 2025-03-01 RX ORDER — HYDROCODONE BITARTRATE AND ACETAMINOPHEN 500; 5 MG/1; MG/1
TABLET ORAL
Status: DISCONTINUED | OUTPATIENT
Start: 2025-03-01 | End: 2025-03-07 | Stop reason: HOSPADM

## 2025-03-01 RX ORDER — SODIUM BICARBONATE 650 MG/1
650 TABLET ORAL 2 TIMES DAILY
Status: DISCONTINUED | OUTPATIENT
Start: 2025-03-01 | End: 2025-03-06

## 2025-03-01 RX ADMIN — POTASSIUM CHLORIDE 20 MEQ: 14.9 INJECTION, SOLUTION INTRAVENOUS at 11:03

## 2025-03-01 RX ADMIN — CETIRIZINE HYDROCHLORIDE 10 MG: 10 TABLET, FILM COATED ORAL at 08:03

## 2025-03-01 RX ADMIN — OXYCODONE AND ACETAMINOPHEN 1 TABLET: 10; 325 TABLET ORAL at 12:03

## 2025-03-01 RX ADMIN — OXYBUTYNIN CHLORIDE 5 MG: 5 TABLET ORAL at 02:03

## 2025-03-01 RX ADMIN — Medication 1 CAPSULE: at 05:03

## 2025-03-01 RX ADMIN — ENOXAPARIN SODIUM 40 MG: 40 INJECTION SUBCUTANEOUS at 05:03

## 2025-03-01 RX ADMIN — MAGNESIUM SULFATE IN DEXTROSE 1 G: 10 INJECTION, SOLUTION INTRAVENOUS at 08:03

## 2025-03-01 RX ADMIN — FAMOTIDINE 40 MG: 20 TABLET, FILM COATED ORAL at 08:03

## 2025-03-01 RX ADMIN — SODIUM CHLORIDE: 9 INJECTION, SOLUTION INTRAVENOUS at 07:03

## 2025-03-01 RX ADMIN — Medication 1 CAPSULE: at 11:03

## 2025-03-01 RX ADMIN — FERROUS SULFATE TAB 325 MG (65 MG ELEMENTAL FE) 1 EACH: 325 (65 FE) TAB at 08:03

## 2025-03-01 RX ADMIN — OXYCODONE AND ACETAMINOPHEN 1 TABLET: 10; 325 TABLET ORAL at 11:03

## 2025-03-01 RX ADMIN — SODIUM BICARBONATE 650 MG TABLET 650 MG: at 11:03

## 2025-03-01 RX ADMIN — Medication 1 CAPSULE: at 08:03

## 2025-03-01 RX ADMIN — ACETAMINOPHEN 1000 MG: 500 TABLET ORAL at 06:03

## 2025-03-01 RX ADMIN — TIOTROPIUM BROMIDE INHALATION SPRAY 2 PUFF: 3.12 SPRAY, METERED RESPIRATORY (INHALATION) at 11:03

## 2025-03-01 RX ADMIN — POTASSIUM CHLORIDE 20 MEQ: 14.9 INJECTION, SOLUTION INTRAVENOUS at 09:03

## 2025-03-01 RX ADMIN — OXYBUTYNIN CHLORIDE 5 MG: 5 TABLET ORAL at 11:03

## 2025-03-01 RX ADMIN — OXYBUTYNIN CHLORIDE 5 MG: 5 TABLET ORAL at 08:03

## 2025-03-01 RX ADMIN — SODIUM BICARBONATE 650 MG TABLET 650 MG: at 08:03

## 2025-03-01 NOTE — PROCEDURES
"Lauren Ring is a 64 y.o. female patient.    Temp: 97.9 °F (36.6 °C) (03/01/25 1148)  Pulse: 86 (03/01/25 1148)  Resp: 18 (03/01/25 1244)  BP: 106/64 (03/01/25 1148)  SpO2: 97 % (03/01/25 1148)  Weight: 52.2 kg (115 lb 1.3 oz) (02/24/25 1013)  Height: 5' 5.98" (167.6 cm) (02/24/25 1013)    PICC  Date/Time: 3/1/2025 2:32 PM  Performed by: Ludivina Brown RN  Consent Done: Yes  Time out: Immediately prior to procedure a time out was called to verify the correct patient, procedure, equipment, support staff and site/side marked as required  Indications: med administration and vascular access  Anesthesia: local infiltration  Local anesthetic: lidocaine 1% without epinephrine    Preparation: skin prepped with ChloraPrep  Skin prep agent dried: skin prep agent completely dried prior to procedure  Sterile barriers: all five maximum sterile barriers used - cap, mask, sterile gown, sterile gloves, and large sterile sheet  Hand hygiene: hand hygiene performed prior to central venous catheter insertion  Location details: right basilic  Catheter type: single lumen  Catheter size: 4 Fr  Catheter Length: 17cm    Ultrasound guidance: yes  Vessel Caliber: patentNeedle advanced into vessel with real time Ultrasound guidance.  Guidewire confirmed in vessel.  Sterile sheath used.  Number of attempts: 1  Post-procedure: blood return through all ports, chlorhexidine patch and sterile dressing applied            Name SONIA Brown RN     3/1/2025    "

## 2025-03-01 NOTE — PROGRESS NOTES
[facility]  Hospital Medicine Progress Note        Chief Complaint: Inpatient Follow-up for fracture    HPI:   Lauren Ring is a 64 y.o. 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 The patient presented to [facility] on 2/21/2025 with a primary complaint of pain to left hip after falling from bed around 2 weeks ago.  Patient was seen at emergency room [facility] and diagnosed with displaced left femoral neck fracture with a shortening.  Patient has been seen by Orthopedics with plans for left hip arthroplasty tomorrow.  Additional history includes hepatitis-C treated as well as lung cancer status post lobectomy.  Patient is supposed to be on O2 but not currently.  We will go ahead and place her on tele.  Patient voices no complaints.  No distress and pain appears to be controlled.  Physical examination her left leg is shortened and laterally displaced.        02/22/2025 Dr. Steinberg-patient is status post left hip hemiarthroplasty without complications.  She remains alert/active and oriented and requesting food.        2/23/25 dr steinberg - nica /  stable . Looks great . Wants better pain control      02/24/2025 Dr. Steinberg-chart reviewed patient examined she voices no complaints.  Tolerating physical therapy better.  Pain better controlled.  We will draw labs for tomorrow in  is working on discharge planning/placement     02/25/2025 Dr. Steinberg-still low to progress requiring assistance for ambulation.   looking into skilled nursing facility.   Voices no complaints, no distress     02/26/2025 Dr. Steinberg-chart reviewed patient examined.  She was doing well last night when she was last seen.  Now with nausea/vomiting/diarrhea/fever/tender surgical site with some erythema and induration as well as right upper extremity medial aspect where previous IV site was with erythema and induration.             Interval Hx:   Tmax 100 overnight.  Abx were stopped yesterday. All cultures were negative. When seen at bedside she was alert, oriented and reports feeling better. Tolerating Po better. No family at bedside. Hb 7.4 today. K severely low with metabolic acidosis, low Mag        Objective/physical exam:  Vitals:    02/28/25 2127 02/28/25 2349 03/01/25 0623 03/01/25 0719   BP:  96/60 97/61 113/69   Pulse:  82 78 85   Resp: 18 16     Temp:  98.6 °F (37 °C) 99.7 °F (37.6 °C) 98.3 °F (36.8 °C)   TempSrc:  Oral Oral Oral   SpO2:  97% 100% 98%   Weight:       Height:         General: In no acute distress, afebrile  Respiratory: Clear to auscultation bilaterally  Cardiovascular: S1, S2, no appreciable murmur  Abdomen: Soft, nontender, BS +  MSK: Warm, no lower extremity edema, no clubbing or cyanosis  Neurologic: Alert and oriented x4, moving all extremities with good strength     Lab Results   Component Value Date     03/01/2025    K 2.6 (LL) 03/01/2025     (H) 03/01/2025    CO2 19 (L) 03/01/2025    BUN <3.0 (L) 03/01/2025    CREATININE 0.56 03/01/2025    CALCIUM 7.5 (L) 03/01/2025    EGFRNONAA >60 07/31/2022      Lab Results   Component Value Date    ALT 11 03/01/2025    AST 22 03/01/2025    ALKPHOS 64 03/01/2025    BILITOT 0.2 03/01/2025      Lab Results   Component Value Date    WBC 7.76 03/01/2025    HGB 7.4 (L) 03/01/2025    HCT 24.5 (L) 03/01/2025    MCV 85.4 03/01/2025     03/01/2025           Medications:   cetirizine  10 mg Oral Daily    enoxparin  40 mg Subcutaneous Q24H (prophylaxis, 1700)    famotidine  40 mg Oral Daily    ferrous sulfate   1 tablet Oral Daily    fluticasone furoate-vilanteroL  1 puff Inhalation Daily    Lactobacillus acidophilus  1 capsule Oral TID WM    magnesium sulfate 1 g IVPB  1 g Intravenous Once    oxybutynin  5 mg Oral TID    potassium chloride in water  20 mEq Intravenous Q2H    tiotropium bromide  2 puff Inhalation Daily        Current Facility-Administered Medications:     acetaminophen, 1,000 mg, Oral, Q6H PRN    albuterol, 2 puff, Inhalation, Q6H PRN    albuterol-ipratropium, 3 mL, Nebulization, TID PRN    ALPRAZolam, 0.5 mg, Oral, TID PRN    diphenhydrAMINE, 25 mg, Oral, Q6H PRN    loperamide, 2 mg, Oral, QID PRN    morphine, 2 mg, Intravenous, Q4H PRN    ondansetron, 4 mg, Intravenous, Q4H PRN    oxyCODONE-acetaminophen, 1 tablet, Oral, Q6H PRN    Flushing PICC/Midline Protocol, , , Until Discontinued **AND** sodium chloride 0.9%, 10 mL, Intravenous, Q12H PRN     Assessment/Plan:    Fall- displaced left femoral neck fracture s/p Arthroplasty  Fever- resolving- cultures negative  Post operative ileus- improving  Diarrhea- resolving  Severe malnutrition  Severe hypokalemia  Hypomagnesemia    Hx: COPD, Lung cancer s/p lobectomy, h/o Osteo right ankle, hepC, Anxiety/depression    Plan:  - Aggressively replace K, add Mag  - transfuse 2 units of blood. Keep prophylactic lovenox  - Low suspicion for sepsis. Off abx  - PT, OT. Analgesics as needed. Needs placement  - Ileus improving. Advance diet as tolerated  - imodium if needed for loose stools. Cultures negative      Critical care diagnosis:Severe hypokalemia, Anemia requiring blood transfusion  Critical care time: 50 minutes      Benjamin Naranjo MD

## 2025-03-01 NOTE — PT/OT/SLP PROGRESS
Physical Therapy Treatment    Patient Name:  Lauren Ring   MRN:  [id number]    Recommendations:     Discharge therapy intensity: Moderate Intensity Therapy   Discharge Equipment Recommendations: to be determined by next level of care  Barriers to discharge: Inaccessible home, Decreased caregiver support, and Impaired mobility    Assessment:     Lauren Ring is a 64 y.o. female admitted with a medical diagnosis of .  She presents with the following impairments/functional limitations: weakness, impaired endurance, impaired self care skills, impaired functional mobility, gait instability, impaired balance, decreased safety awareness, pain L displaced femoral neck fx following a fall 2 weeks ago; now s/p L hip hemiarthroplasty on 2/22. .      Rehab Prognosis: Good; patient would benefit from acute skilled PT services to address these deficits and reach maximum level of function.    Recent Surgery: Procedure(s) (LRB):  HEMIARTHROPLASTY, HIP, POSTERIOR APPROACH (Left) 7 Days Post-Op    Plan:     During this hospitalization, patient would benefit from acute PT services 6 x/week to address the identified rehab impairments via gait training, therapeutic activities, therapeutic exercises, neuromuscular re-education and progress toward the following goals:    Plan of Care Expires:  03/23/25    Subjective     Chief Complaint:   Patient/Family Comments/goals:   Pain/Comfort:  Location - Side 1: Left  Location 1: hip  Pain Addressed 1: Reposition, Distraction      Objective:     Communicated with NSG prior to session.  Patient found HOB elevated with PureWick upon PT entry to room.     General Precautions: Standard, fall  Orthopedic Precautions: LLE weight bearing as tolerated, LLE posterior precautions  Braces: N/A  Respiratory Status: Nasal cannula, flow 2 L/min  Blood Pressure:   Skin Integrity: Visible skin intact      Functional Mobility:  Bed Mobility:     Scooting: contact guard assistance  Supine to Sit: minimum  assistance  Transfers:     Sit to Stand:  minimum assistance with rolling walker and 1 trial from EOB,1 trial from toilet   Toilet Transfer: minimum assistance with  rolling walker  using  Step Transfer and pt able to have BM  Gait: pt amb 10ft/20ft with RW Jose. Pt with unsteady gait pattern throughout and presents with short step-through gait pattern. Seated rest between trials    Dyn sitting: pt sat on toilet while attempting pericare. Pt with 1 LOB and was Jose to correct. Otherwise pt CGA      Patient left up in chair with all lines intact and call button in reach    GOALS:   Multidisciplinary Problems       Physical Therapy Goals          Problem: Physical Therapy    Goal Priority Disciplines Outcome Interventions   Physical Therapy Goal     PT, PT/OT Progressing    Description: Goals to be met by: 3/23/25     Patient will increase functional independence with mobility by performin. Supine to sit with Set-up [place]  2. Sit to supine with Set-up [place]  3. Sit to stand transfer with Supervision  4. Bed to chair transfer with Supervision using Rolling Walker  5. Gait  x 200 feet with Supervision using Rolling Walker.                            Time Tracking:     PT Received On: 25  PT Start Time: 947     PT Stop Time: 1013  PT Total Time (min): 26 min     Billable Minutes: Gait Training 16 and Therapeutic Activity 10    Treatment Type: Treatment  PT/PTA: PTA     Number of PTA visits since last PT visit: 4     2025

## 2025-03-02 LAB
ALBUMIN SERPL-MCNC: 1.9 G/DL (ref 3.4–4.8)
ALBUMIN/GLOB SERPL: 0.7 RATIO (ref 1.1–2)
ALP SERPL-CCNC: 68 UNIT/L (ref 40–150)
ALT SERPL-CCNC: 12 UNIT/L (ref 0–55)
ANION GAP SERPL CALC-SCNC: 7 MEQ/L
AST SERPL-CCNC: 21 UNIT/L (ref 5–34)
BASOPHILS # BLD AUTO: 0.05 X10(3)/MCL
BASOPHILS NFR BLD AUTO: 0.7 %
BILIRUB SERPL-MCNC: 0.5 MG/DL
BUN SERPL-MCNC: <3 MG/DL (ref 9.8–20.1)
CALCIUM SERPL-MCNC: 7.6 MG/DL (ref 8.4–10.2)
CHLORIDE SERPL-SCNC: 111 MMOL/L (ref 98–107)
CO2 SERPL-SCNC: 21 MMOL/L (ref 23–31)
CREAT SERPL-MCNC: 0.54 MG/DL (ref 0.55–1.02)
CREAT/UREA NIT SERPL: <6
EOSINOPHIL # BLD AUTO: 0.27 X10(3)/MCL (ref 0–0.9)
EOSINOPHIL NFR BLD AUTO: 3.6 %
ERYTHROCYTE [DISTWIDTH] IN BLOOD BY AUTOMATED COUNT: 17.2 % (ref 11.5–17)
GFR SERPLBLD CREATININE-BSD FMLA CKD-EPI: >60 ML/MIN/1.73/M2
GLOBULIN SER-MCNC: 2.8 GM/DL (ref 2.4–3.5)
GLUCOSE SERPL-MCNC: 92 MG/DL (ref 82–115)
HCT VFR BLD AUTO: 32.5 % (ref 37–47)
HGB BLD-MCNC: 10.4 G/DL (ref 12–16)
IMM GRANULOCYTES # BLD AUTO: 0.04 X10(3)/MCL (ref 0–0.04)
IMM GRANULOCYTES NFR BLD AUTO: 0.5 %
LYMPHOCYTES # BLD AUTO: 1.7 X10(3)/MCL (ref 0.6–4.6)
LYMPHOCYTES NFR BLD AUTO: 22.5 %
MAGNESIUM SERPL-MCNC: 1.7 MG/DL (ref 1.6–2.6)
MCH RBC QN AUTO: 26.9 PG (ref 27–31)
MCHC RBC AUTO-ENTMCNC: 32 G/DL (ref 33–36)
MCV RBC AUTO: 84 FL (ref 80–94)
MONOCYTES # BLD AUTO: 0.98 X10(3)/MCL (ref 0.1–1.3)
MONOCYTES NFR BLD AUTO: 13 %
NEUTROPHILS # BLD AUTO: 4.5 X10(3)/MCL (ref 2.1–9.2)
NEUTROPHILS NFR BLD AUTO: 59.7 %
NRBC BLD AUTO-RTO: 0 %
PLATELET # BLD AUTO: 264 X10(3)/MCL (ref 130–400)
PMV BLD AUTO: 10.1 FL (ref 7.4–10.4)
POTASSIUM SERPL-SCNC: 3 MMOL/L (ref 3.5–5.1)
PROT SERPL-MCNC: 4.7 GM/DL (ref 5.8–7.6)
RBC # BLD AUTO: 3.87 X10(6)/MCL (ref 4.2–5.4)
SODIUM SERPL-SCNC: 139 MMOL/L (ref 136–145)
WBC # BLD AUTO: 7.54 X10(3)/MCL (ref 4.5–11.5)

## 2025-03-02 PROCEDURE — 25000003 PHARM REV CODE 250: Performed by: INTERNAL MEDICINE

## 2025-03-02 PROCEDURE — 80053 COMPREHEN METABOLIC PANEL: CPT | Performed by: INTERNAL MEDICINE

## 2025-03-02 PROCEDURE — 63600175 PHARM REV CODE 636 W HCPCS: Performed by: INTERNAL MEDICINE

## 2025-03-02 PROCEDURE — 36415 COLL VENOUS BLD VENIPUNCTURE: CPT | Performed by: INTERNAL MEDICINE

## 2025-03-02 PROCEDURE — 21400001 HC TELEMETRY ROOM

## 2025-03-02 PROCEDURE — 85025 COMPLETE CBC W/AUTO DIFF WBC: CPT | Performed by: INTERNAL MEDICINE

## 2025-03-02 PROCEDURE — 83735 ASSAY OF MAGNESIUM: CPT | Performed by: INTERNAL MEDICINE

## 2025-03-02 PROCEDURE — 97535 SELF CARE MNGMENT TRAINING: CPT | Mod: CO

## 2025-03-02 RX ORDER — POLYETHYLENE GLYCOL 3350 17 G/17G
17 POWDER, FOR SOLUTION ORAL DAILY
Status: DISCONTINUED | OUTPATIENT
Start: 2025-03-02 | End: 2025-03-07 | Stop reason: HOSPADM

## 2025-03-02 RX ORDER — MAGNESIUM SULFATE HEPTAHYDRATE 40 MG/ML
2 INJECTION, SOLUTION INTRAVENOUS ONCE
Status: COMPLETED | OUTPATIENT
Start: 2025-03-02 | End: 2025-03-02

## 2025-03-02 RX ORDER — POTASSIUM CHLORIDE 20 MEQ/1
20 TABLET, EXTENDED RELEASE ORAL ONCE
Status: COMPLETED | OUTPATIENT
Start: 2025-03-02 | End: 2025-03-02

## 2025-03-02 RX ADMIN — OXYCODONE AND ACETAMINOPHEN 1 TABLET: 10; 325 TABLET ORAL at 04:03

## 2025-03-02 RX ADMIN — POTASSIUM CHLORIDE 20 MEQ: 1500 TABLET, EXTENDED RELEASE ORAL at 09:03

## 2025-03-02 RX ADMIN — OXYBUTYNIN CHLORIDE 5 MG: 5 TABLET ORAL at 10:03

## 2025-03-02 RX ADMIN — Medication 1 CAPSULE: at 04:03

## 2025-03-02 RX ADMIN — CETIRIZINE HYDROCHLORIDE 10 MG: 10 TABLET, FILM COATED ORAL at 09:03

## 2025-03-02 RX ADMIN — FAMOTIDINE 40 MG: 20 TABLET, FILM COATED ORAL at 09:03

## 2025-03-02 RX ADMIN — Medication 1 CAPSULE: at 09:03

## 2025-03-02 RX ADMIN — Medication 1 CAPSULE: at 11:03

## 2025-03-02 RX ADMIN — SODIUM BICARBONATE 650 MG TABLET 650 MG: at 10:03

## 2025-03-02 RX ADMIN — SODIUM CHLORIDE: 9 INJECTION, SOLUTION INTRAVENOUS at 10:03

## 2025-03-02 RX ADMIN — OXYCODONE AND ACETAMINOPHEN 1 TABLET: 10; 325 TABLET ORAL at 10:03

## 2025-03-02 RX ADMIN — ENOXAPARIN SODIUM 40 MG: 40 INJECTION SUBCUTANEOUS at 04:03

## 2025-03-02 RX ADMIN — FERROUS SULFATE TAB 325 MG (65 MG ELEMENTAL FE) 1 EACH: 325 (65 FE) TAB at 09:03

## 2025-03-02 RX ADMIN — TIOTROPIUM BROMIDE INHALATION SPRAY 2 PUFF: 3.12 SPRAY, METERED RESPIRATORY (INHALATION) at 04:03

## 2025-03-02 RX ADMIN — MAGNESIUM SULFATE HEPTAHYDRATE 2 G: 40 INJECTION, SOLUTION INTRAVENOUS at 09:03

## 2025-03-02 RX ADMIN — SODIUM BICARBONATE 650 MG TABLET 650 MG: at 09:03

## 2025-03-02 RX ADMIN — OXYBUTYNIN CHLORIDE 5 MG: 5 TABLET ORAL at 04:03

## 2025-03-02 RX ADMIN — OXYBUTYNIN CHLORIDE 5 MG: 5 TABLET ORAL at 09:03

## 2025-03-02 RX ADMIN — OXYCODONE AND ACETAMINOPHEN 1 TABLET: 10; 325 TABLET ORAL at 05:03

## 2025-03-02 NOTE — PT/OT/SLP PROGRESS
Occupational Therapy   Treatment    Name: Lauren Ring  MRN: [id number]    Recommendations:     Recommended therapy intensity at discharge: Moderate Intensity Therapy   Discharge Equipment Recommendations:  to be determined by next level of care  Barriers to discharge:       Assessment:     Lauren Ring is a 64 y.o. female with a medical diagnosis of L displaced femoral neck fx following a fall 2 weeks ago; now s/p L hip hemiarthroplasty on 2/22. Performance deficits affecting function are weakness, impaired endurance, impaired self care skills, impaired functional mobility, gait instability, impaired balance, decreased safety awareness, decreased lower extremity function, decreased upper extremity function, orthopedic precautions. Pt very deconditioned and fatigues quickly. Education provided on importance of continued mobility and sitting UIC. Verbalized understanding.     Rehab Prognosis:  Good; patient would benefit from acute skilled OT services to address these deficits and reach maximum level of function.       Plan:     Patient to be seen 6 x/week to address the above listed problems via self-care/home management, therapeutic activities, therapeutic exercises  Plan of Care Expires: 03/23/25  Plan of Care Reviewed with: patient    Subjective     Pain/Comfort:  Location - Side 1: Left  Location 1: hip  Pain Addressed 1: Reposition, Distraction    Objective:     Communicated with: RN prior to session.  Patient found HOB elevated with peripheral IV, oxygen upon OT entry to room.    General Precautions: Standard, fall    Orthopedic Precautions:LLE weight bearing as tolerated, LLE posterior precautions  Braces: N/A  Respiratory Status: Nasal cannula, flow 2 L/min     Occupational Performance:     Bed Mobility:    Patient completed Scooting/Bridging with contact guard assistance  Patient completed Supine to Sit with minimum assistance     Functional Mobility/Transfers:  Patient completed Sit <> Stand Transfer with  minimum assistance  with  rolling walker   Patient completed Toilet Transfer Step Transfer technique with minimum assistance with  grab bars  Functional Mobility: ambulated to bathroom with Min A and RW. No LOB. Fatigued quickly.     Activities of Daily Living:  Toileting: SBA for seated pericare after +BM.   LE dressing: Max A to don socks.     Therapeutic Positioning    OT interventions performed during the course of today's session in an effort to prevent and/or reduce acquired pressure injuries:   Education was provided on benefits of and recommendations for therapeutic positioning    [facility] 6 Click ADL: 19    Patient Education:  Patient provided with verbal education education regarding OT role/goals/POC and safety awareness.  Understanding was verbalized.      Patient left up in chair with all lines intact and call button in reach.    GOALS:   Multidisciplinary Problems       Occupational Therapy Goals          Problem: Occupational Therapy    Goal Priority Disciplines Outcome Interventions   Occupational Therapy Goal     OT, PT/OT Progressing    Description: LTG: Pt will perform basic ADLs and ADL transfers with Modified independence using LRAD by discharge.    STG: to be met by 3/23/25    Pt will complete grooming standing at sink with LRAD with SBA.  Pt will complete UB dressing with SBA.  Pt will complete LB dressing with SBA using LRAD and AE prn.  Pt will complete toileting with SBA using LRAD.  Pt will complete functional mobility to/from toilet and toilet transfer with SBA using LRAD.                        Time Tracking:     OT Date of Treatment: 03/02/25  OT Start Time: 0752  OT Stop Time: 0817  OT Total Time (min): 25 min    Billable Minutes:Self Care/Home Management 25    OT/KIMBERLEY: KIMBERLEY     Number of KIMBERLEY visits since last OT visit: 4    3/2/2025

## 2025-03-02 NOTE — PROGRESS NOTES
[facility]  Hospital Medicine Progress Note        Chief Complaint: Inpatient Follow-up for fracture    HPI:   Lauren Ring is a 64 y.o. 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 The patient presented to [facility] on 2/21/2025 with a primary complaint of pain to left hip after falling from bed around 2 weeks ago.  Patient was seen at emergency room [facility] and diagnosed with displaced left femoral neck fracture with a shortening.  Patient has been seen by Orthopedics with plans for left hip arthroplasty tomorrow.  Additional history includes hepatitis-C treated as well as lung cancer status post lobectomy.  Patient is supposed to be on O2 but not currently.  We will go ahead and place her on tele.  Patient voices no complaints.  No distress and pain appears to be controlled.  Physical examination her left leg is shortened and laterally displaced.        02/22/2025 Dr. Steinberg-patient is status post left hip hemiarthroplasty without complications.  She remains alert/active and oriented and requesting food.        2/23/25 dr steinberg - nica /  stable . Looks great . Wants better pain control      02/24/2025 Dr. Steinberg-chart reviewed patient examined she voices no complaints.  Tolerating physical therapy better.  Pain better controlled.  We will draw labs for tomorrow in  is working on discharge planning/placement     02/25/2025 Dr. Steinberg-still low to progress requiring assistance for ambulation.   looking into skilled nursing facility.   Voices no complaints, no distress     02/26/2025 Dr. Steinberg-chart reviewed patient examined.  She was doing well last night when she was last seen.  Now with nausea/vomiting/diarrhea/fever/tender surgical site with some erythema and induration as well as right upper extremity medial aspect where previous IV site was with erythema and induration.             Interval Hx:   Alert, resting in the chair.  Sitter at bedside.  Patient reports lack of sleep.  Received 2 units of blood yesterday and hemoglobin improved appropriately.  She is afebrile.  Tolerating diet.  Yet to have good bowel movement.  Stable from respiratory standpoint.      Labs this morning showing hemoglobin 10.4, stable platelet count, mild hypokalemia but improved since yesterday.  Acidosis improving as well.        Objective/physical exam:  Vitals:    03/02/25 0021 03/02/25 0305 03/02/25 0305 03/02/25 0540   BP: 112/74 110/70 110/70    Pulse: 87 86 86    Resp: 18 19 19 19   Temp: 99.3 °F (37.4 °C) 98.8 °F (37.1 °C) 98.8 °F (37.1 °C)    TempSrc: Oral Oral Oral    SpO2: 100% 95% 95%    Weight:       Height:         General: In no acute distress, afebrile  Respiratory: Clear to auscultation bilaterally  Cardiovascular: S1, S2, no appreciable murmur  Abdomen: Soft, nontender, BS +  MSK: Warm, no lower extremity edema, no clubbing or cyanosis  Neurologic: Alert and oriented x4, moving all extremities with good strength     Lab Results   Component Value Date     03/02/2025    K 3.0 (L) 03/02/2025     (H) 03/02/2025    CO2 21 (L) 03/02/2025    BUN <3.0 (L) 03/02/2025    CREATININE 0.54 (L) 03/02/2025    CALCIUM 7.6 (L) 03/02/2025    EGFRNONAA >60 07/31/2022      Lab Results   Component Value Date    ALT 12 03/02/2025    AST 21 03/02/2025    ALKPHOS 68 03/02/2025    BILITOT 0.5 03/02/2025      Lab Results   Component Value Date    WBC 7.54 03/02/2025    HGB 10.4 (L) 03/02/2025    HCT 32.5 (L) 03/02/2025    MCV 84.0 03/02/2025      03/02/2025           Medications:   cetirizine  10 mg Oral Daily    enoxparin  40 mg Subcutaneous Q24H (prophylaxis, 1700)    famotidine  40 mg Oral Daily    ferrous sulfate  1 tablet Oral Daily    fluticasone furoate-vilanteroL  1 puff Inhalation Daily    Lactobacillus acidophilus  1 capsule Oral TID WM    magnesium sulfate 2 g IVPB  2 g Intravenous Once    oxybutynin  5 mg Oral TID    potassium chloride  20 mEq Oral Once    sodium bicarbonate  650 mg Oral BID    tiotropium bromide  2 puff Inhalation Daily        Current Facility-Administered Medications:     0.9%  NaCl infusion (for blood administration), , Intravenous, Q24H PRN    acetaminophen, 1,000 mg, Oral, Q6H PRN    albuterol, 2 puff, Inhalation, Q6H PRN    albuterol-ipratropium, 3 mL, Nebulization, TID PRN    ALPRAZolam, 0.5 mg, Oral, TID PRN    diphenhydrAMINE, 25 mg, Oral, Q6H PRN    loperamide, 2 mg, Oral, QID PRN    morphine, 2 mg, Intravenous, Q4H PRN    ondansetron, 4 mg, Intravenous, Q4H PRN    oxyCODONE-acetaminophen, 1 tablet, Oral, Q6H PRN    Flushing PICC/Midline Protocol, , , Until Discontinued **AND** sodium chloride 0.9%, 10 mL, Intravenous, Q12H PRN     Assessment/Plan:    Fall- displaced left femoral neck fracture s/p Arthroplasty  Fever- resolving- cultures negative  Post operative ileus- improving  Diarrhea- resolving  Severe malnutrition  Severe hypokalemia  Hypomagnesemia    Hx: COPD, Lung cancer s/p lobectomy, h/o Osteo right ankle, hepC, Anxiety/depression    Plan:  -hemoglobin improved appropriately.  Monitor while inpatient   -give another dose of potassium today.  Continue telemetry and electrolyte monitoring.  -ileus improving.  encourage p.o. intake.  Advance as tolerated.    -acidosis improving.  Continue p.o. bicarb  -Bowel regimen   -off all antibiotics.  Low suspicion for sepsis  -PT OT.  She will need placement   -Imodium if needed for loose stools.  Continue probiotics.  Cultures negative    Khai Naranjo,  MD

## 2025-03-02 NOTE — PLAN OF CARE
Problem: Adult Inpatient Plan of Care  Goal: Plan of Care Review  Outcome: Progressing  Goal: Patient-Specific Goal (Individualized)  Outcome: Progressing  Goal: Absence of Hospital-Acquired Illness or Injury  Outcome: Progressing  Goal: Optimal Comfort and Wellbeing  Outcome: Progressing  Goal: Readiness for Transition of Care  Outcome: Progressing     Problem: Fall Injury Risk  Goal: Absence of Fall and Fall-Related Injury  Outcome: Progressing     Problem: Pain Acute  Goal: Optimal Pain Control and Function  Outcome: Progressing     Problem: Wound  Goal: Optimal Coping  Outcome: Progressing  Goal: Optimal Functional Ability  Outcome: Progressing  Goal: Absence of Infection Signs and Symptoms  Outcome: Progressing  Goal: Improved Oral Intake  Outcome: Progressing  Goal: Optimal Pain Control and Function  Outcome: Progressing  Goal: Skin Health and Integrity  Outcome: Progressing  Goal: Optimal Wound Healing  Outcome: Progressing     Problem: Infection  Goal: Absence of Infection Signs and Symptoms  Outcome: Progressing

## 2025-03-03 LAB
ALBUMIN SERPL-MCNC: 1.8 G/DL (ref 3.4–4.8)
ALBUMIN/GLOB SERPL: 0.7 RATIO (ref 1.1–2)
ALP SERPL-CCNC: 80 UNIT/L (ref 40–150)
ALT SERPL-CCNC: 14 UNIT/L (ref 0–55)
ANION GAP SERPL CALC-SCNC: 6 MEQ/L
AST SERPL-CCNC: 29 UNIT/L (ref 5–34)
BACTERIA BLD CULT: NORMAL
BACTERIA BLD CULT: NORMAL
BASOPHILS # BLD AUTO: 0.03 X10(3)/MCL
BASOPHILS NFR BLD AUTO: 0.4 %
BILIRUB SERPL-MCNC: 0.3 MG/DL
BUN SERPL-MCNC: <3 MG/DL (ref 9.8–20.1)
CALCIUM SERPL-MCNC: 7.4 MG/DL (ref 8.4–10.2)
CHLORIDE SERPL-SCNC: 113 MMOL/L (ref 98–107)
CO2 SERPL-SCNC: 22 MMOL/L (ref 23–31)
CREAT SERPL-MCNC: 0.54 MG/DL (ref 0.55–1.02)
CREAT/UREA NIT SERPL: <6
EOSINOPHIL # BLD AUTO: 0.28 X10(3)/MCL (ref 0–0.9)
EOSINOPHIL NFR BLD AUTO: 3.8 %
ERYTHROCYTE [DISTWIDTH] IN BLOOD BY AUTOMATED COUNT: 18.1 % (ref 11.5–17)
GFR SERPLBLD CREATININE-BSD FMLA CKD-EPI: >60 ML/MIN/1.73/M2
GLOBULIN SER-MCNC: 2.6 GM/DL (ref 2.4–3.5)
GLUCOSE SERPL-MCNC: 109 MG/DL (ref 82–115)
HCT VFR BLD AUTO: 33.3 % (ref 37–47)
HGB BLD-MCNC: 10.6 G/DL (ref 12–16)
IMM GRANULOCYTES # BLD AUTO: 0.04 X10(3)/MCL (ref 0–0.04)
IMM GRANULOCYTES NFR BLD AUTO: 0.5 %
LYMPHOCYTES # BLD AUTO: 1.88 X10(3)/MCL (ref 0.6–4.6)
LYMPHOCYTES NFR BLD AUTO: 25.5 %
MAGNESIUM SERPL-MCNC: 1.9 MG/DL (ref 1.6–2.6)
MCH RBC QN AUTO: 27 PG (ref 27–31)
MCHC RBC AUTO-ENTMCNC: 31.8 G/DL (ref 33–36)
MCV RBC AUTO: 84.9 FL (ref 80–94)
MONOCYTES # BLD AUTO: 0.9 X10(3)/MCL (ref 0.1–1.3)
MONOCYTES NFR BLD AUTO: 12.2 %
NEUTROPHILS # BLD AUTO: 4.23 X10(3)/MCL (ref 2.1–9.2)
NEUTROPHILS NFR BLD AUTO: 57.6 %
NRBC BLD AUTO-RTO: 0 %
PLATELET # BLD AUTO: 246 X10(3)/MCL (ref 130–400)
PMV BLD AUTO: 10.7 FL (ref 7.4–10.4)
POTASSIUM SERPL-SCNC: 2.8 MMOL/L (ref 3.5–5.1)
PROT SERPL-MCNC: 4.4 GM/DL (ref 5.8–7.6)
RBC # BLD AUTO: 3.92 X10(6)/MCL (ref 4.2–5.4)
SODIUM SERPL-SCNC: 141 MMOL/L (ref 136–145)
WBC # BLD AUTO: 7.36 X10(3)/MCL (ref 4.5–11.5)

## 2025-03-03 PROCEDURE — 97530 THERAPEUTIC ACTIVITIES: CPT | Mod: CQ

## 2025-03-03 PROCEDURE — 25000003 PHARM REV CODE 250: Performed by: INTERNAL MEDICINE

## 2025-03-03 PROCEDURE — 27000221 HC OXYGEN, UP TO 24 HOURS

## 2025-03-03 PROCEDURE — 36415 COLL VENOUS BLD VENIPUNCTURE: CPT | Performed by: INTERNAL MEDICINE

## 2025-03-03 PROCEDURE — 80053 COMPREHEN METABOLIC PANEL: CPT | Performed by: INTERNAL MEDICINE

## 2025-03-03 PROCEDURE — 97116 GAIT TRAINING THERAPY: CPT | Mod: CQ

## 2025-03-03 PROCEDURE — 63600175 PHARM REV CODE 636 W HCPCS: Performed by: NURSE PRACTITIONER

## 2025-03-03 PROCEDURE — 63600175 PHARM REV CODE 636 W HCPCS: Performed by: INTERNAL MEDICINE

## 2025-03-03 PROCEDURE — 25000242 PHARM REV CODE 250 ALT 637 W/ HCPCS: Performed by: INTERNAL MEDICINE

## 2025-03-03 PROCEDURE — 21400001 HC TELEMETRY ROOM

## 2025-03-03 PROCEDURE — 83735 ASSAY OF MAGNESIUM: CPT | Performed by: INTERNAL MEDICINE

## 2025-03-03 PROCEDURE — 85025 COMPLETE CBC W/AUTO DIFF WBC: CPT | Performed by: INTERNAL MEDICINE

## 2025-03-03 RX ORDER — POTASSIUM CHLORIDE 14.9 MG/ML
20 INJECTION INTRAVENOUS
Status: COMPLETED | OUTPATIENT
Start: 2025-03-03 | End: 2025-03-03

## 2025-03-03 RX ORDER — LANOLIN ALCOHOL/MO/W.PET/CERES
400 CREAM (GRAM) TOPICAL 2 TIMES DAILY
Status: DISCONTINUED | OUTPATIENT
Start: 2025-03-03 | End: 2025-03-04

## 2025-03-03 RX ADMIN — OXYBUTYNIN CHLORIDE 5 MG: 5 TABLET ORAL at 08:03

## 2025-03-03 RX ADMIN — ENOXAPARIN SODIUM 40 MG: 40 INJECTION SUBCUTANEOUS at 04:03

## 2025-03-03 RX ADMIN — Medication 1 CAPSULE: at 08:03

## 2025-03-03 RX ADMIN — SODIUM BICARBONATE 650 MG TABLET 650 MG: at 08:03

## 2025-03-03 RX ADMIN — Medication 400 MG: at 08:03

## 2025-03-03 RX ADMIN — OXYCODONE AND ACETAMINOPHEN 1 TABLET: 10; 325 TABLET ORAL at 06:03

## 2025-03-03 RX ADMIN — SODIUM CHLORIDE: 9 INJECTION, SOLUTION INTRAVENOUS at 07:03

## 2025-03-03 RX ADMIN — OXYBUTYNIN CHLORIDE 5 MG: 5 TABLET ORAL at 02:03

## 2025-03-03 RX ADMIN — ALPRAZOLAM 0.5 MG: 0.5 TABLET ORAL at 09:03

## 2025-03-03 RX ADMIN — Medication 1 CAPSULE: at 04:03

## 2025-03-03 RX ADMIN — FLUTICASONE FUROATE AND VILANTEROL TRIFENATATE 1 PUFF: 100; 25 POWDER RESPIRATORY (INHALATION) at 08:03

## 2025-03-03 RX ADMIN — FERROUS SULFATE TAB 325 MG (65 MG ELEMENTAL FE) 1 EACH: 325 (65 FE) TAB at 08:03

## 2025-03-03 RX ADMIN — MORPHINE SULFATE 2 MG: 4 INJECTION, SOLUTION INTRAMUSCULAR; INTRAVENOUS at 09:03

## 2025-03-03 RX ADMIN — POTASSIUM CHLORIDE 20 MEQ: 14.9 INJECTION, SOLUTION INTRAVENOUS at 10:03

## 2025-03-03 RX ADMIN — TIOTROPIUM BROMIDE INHALATION SPRAY 2 PUFF: 3.12 SPRAY, METERED RESPIRATORY (INHALATION) at 04:03

## 2025-03-03 RX ADMIN — OXYCODONE AND ACETAMINOPHEN 1 TABLET: 10; 325 TABLET ORAL at 09:03

## 2025-03-03 RX ADMIN — CETIRIZINE HYDROCHLORIDE 10 MG: 10 TABLET, FILM COATED ORAL at 08:03

## 2025-03-03 RX ADMIN — FAMOTIDINE 40 MG: 20 TABLET, FILM COATED ORAL at 08:03

## 2025-03-03 RX ADMIN — POTASSIUM CHLORIDE 20 MEQ: 14.9 INJECTION, SOLUTION INTRAVENOUS at 08:03

## 2025-03-03 RX ADMIN — POTASSIUM BICARBONATE 20 MEQ: 391 TABLET, EFFERVESCENT ORAL at 02:03

## 2025-03-03 RX ADMIN — Medication 1 CAPSULE: at 11:03

## 2025-03-03 RX ADMIN — OXYCODONE AND ACETAMINOPHEN 1 TABLET: 10; 325 TABLET ORAL at 02:03

## 2025-03-03 RX ADMIN — POLYETHYLENE GLYCOL 3350 17 G: 17 POWDER, FOR SOLUTION ORAL at 08:03

## 2025-03-03 NOTE — PT/OT/SLP PROGRESS
Physical Therapy Treatment    Patient Name:  Lauren Ring   MRN:  [id number]    Recommendations:     Discharge therapy intensity: Moderate Intensity Therapy   Discharge Equipment Recommendations: to be determined by next level of care  Barriers to discharge: Inaccessible home, Decreased caregiver support, and Impaired mobility    Assessment:     Lauren Ring is a 64 y.o. female admitted with a medical diagnosis of .  She presents with the following impairments/functional limitations: weakness, impaired endurance, impaired self care skills, impaired functional mobility, gait instability, impaired balance, decreased safety awareness, pain L displaced femoral neck fx following a fall 2 weeks ago; now s/p L hip hemiarthroplasty on 2/22. .      Rehab Prognosis: Good; patient would benefit from acute skilled PT services to address these deficits and reach maximum level of function.    Recent Surgery: Procedure(s) (LRB):  HEMIARTHROPLASTY, HIP, POSTERIOR APPROACH (Left) 9 Days Post-Op    Plan:     During this hospitalization, patient would benefit from acute PT services 6 x/week to address the identified rehab impairments via gait training, therapeutic activities, therapeutic exercises, neuromuscular re-education and progress toward the following goals:    Plan of Care Expires:  03/23/25    Subjective     Chief Complaint:   Patient/Family Comments/goals:   Pain/Comfort:  Pain Rating 1: 0/10      Objective:     Communicated with NSG prior to session.  Patient found HOB elevated with oxygen, peripheral IV upon PT entry to room.     General Precautions: Standard, fall  Orthopedic Precautions: LLE weight bearing as tolerated, LLE posterior precautions  Braces: N/A  Respiratory Status: Nasal cannula, flow 2 L/min  Blood Pressure:   Skin Integrity: Visible skin intact      Functional Mobility:  Bed Mobility:     Scooting: contact guard assistance  Supine to Sit: contact guard assistance  Transfers:     Sit to Stand:  minimum  assistance with rolling walker and 1 trial from EOB,1 trial from toilet   Toilet Transfer: minimum assistance with  rolling walker  using  Step Transfer and pt able to void urine. Required increased time turning to sit  Gait: pt amb 10ft/5ft with RW Jose. Pt with unsteady gait pattern throughout and presents with short step-through gait pattern. Seated rest between trials . Pt very fatigued during 2nd trial.      Pt required increased time and rest for all activities.       Patient left up in chair with all lines intact and call button in reach    GOALS:   Multidisciplinary Problems       Physical Therapy Goals          Problem: Physical Therapy    Goal Priority Disciplines Outcome Interventions   Physical Therapy Goal     PT, PT/OT Progressing    Description: Goals to be met by: 3/23/25     Patient will increase functional independence with mobility by performin. Supine to sit with Set-up [place]  2. Sit to supine with Set-up [place]  3. Sit to stand transfer with Supervision  4. Bed to chair transfer with Supervision using Rolling Walker  5. Gait  x 200 feet with Supervision using Rolling Walker.                            Time Tracking:     PT Received On: 25  PT Start Time: 1100     PT Stop Time: 1126  PT Total Time (min): 26 min     Billable Minutes: Gait Training 16 and Therapeutic Activity 10    Treatment Type: Treatment  PT/PTA: PTA     Number of PTA visits since last PT visit: 5     2025

## 2025-03-03 NOTE — PROGRESS NOTES
[facility]  Hospital Medicine Progress Note        Chief Complaint: Inpatient Follow-up for fracture    HPI:   Lauren Ring is a 64 y.o. 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 The patient presented to [facility] on 2/21/2025 with a primary complaint of pain to left hip after falling from bed around 2 weeks ago.  Patient was seen at emergency room [facility] and diagnosed with displaced left femoral neck fracture with a shortening.  Patient has been seen by Orthopedics with plans for left hip arthroplasty tomorrow.  Additional history includes hepatitis-C treated as well as lung cancer status post lobectomy.  Patient is supposed to be on O2 but not currently.  We will go ahead and place her on tele.  Patient voices no complaints.  No distress and pain appears to be controlled.  Physical examination her left leg is shortened and laterally displaced.        02/22/2025 Dr. Steinberg-patient is status post left hip hemiarthroplasty without complications.  She remains alert/active and oriented and requesting food.        2/23/25 dr steinberg - nica /  stable . Looks great . Wants better pain control      02/24/2025 Dr. Steinberg-chart reviewed patient examined she voices no complaints.  Tolerating physical therapy better.  Pain better controlled.  We will draw labs for tomorrow in  is working on discharge planning/placement     02/25/2025 Dr. Steinberg-still low to progress requiring assistance for ambulation.   looking into skilled nursing facility.   Voices no complaints, no distress     02/26/2025 Dr. Steinberg-chart reviewed patient examined.  She was doing well last night when she was last seen.  Now with nausea/vomiting/diarrhea/fever/tender surgical site with some erythema and induration as well as right upper extremity medial aspect where previous IV site was with erythema and induration.             Interval Hx:   Reports feeling restless.  Sister at bedside.  She is afebrile.  P.o. with no nausea or vomiting.  Pain is improving with the analgesics.      Hemoglobin and platelets looking good.  Hypokalemia with mild acidosis noted.  Mag is adequate.    Objective/physical exam:  Vitals:    03/02/25 2229 03/03/25 0320 03/03/25 0616 03/03/25 0803   BP:  107/70  110/71   Pulse:  77  93   Resp: 19 19 19 16   Temp:  98.5 °F (36.9 °C)  98.3 °F (36.8 °C)   TempSrc:  Oral  Oral   SpO2:  98%  (!) 93%   Weight:       Height:         General: In no acute distress, afebrile  Respiratory: Clear to auscultation bilaterally  Cardiovascular: S1, S2, no appreciable murmur  Abdomen: Soft, nontender, BS +  MSK: Warm, no lower extremity edema, no clubbing or cyanosis  Neurologic: Alert and oriented x4, moving all extremities with good strength     Lab Results   Component Value Date     03/03/2025    K 2.8 (L) 03/03/2025     (H) 03/03/2025    CO2 22 (L) 03/03/2025    BUN <3.0 (L) 03/03/2025    CREATININE 0.54 (L) 03/03/2025    CALCIUM 7.4 (L) 03/03/2025    EGFRNONAA >60 07/31/2022      Lab Results   Component Value Date    ALT 14 03/03/2025    AST 29 03/03/2025    ALKPHOS 80 03/03/2025    BILITOT 0.3 03/03/2025      Lab Results   Component Value Date    WBC 7.36 03/03/2025    HGB 10.6 (L) 03/03/2025    HCT 33.3 (L) 03/03/2025    MCV 84.9 03/03/2025     03/03/2025           Medications:   cetirizine  10 mg Oral Daily    enoxparin  40 mg Subcutaneous Q24H (prophylaxis, 1700)    famotidine  40 mg Oral Daily    ferrous sulfate  1 tablet Oral Daily    fluticasone  furoate-vilanteroL  1 puff Inhalation Daily    Lactobacillus acidophilus  1 capsule Oral TID WM    magnesium oxide  400 mg Oral BID    oxybutynin  5 mg Oral TID    polyethylene glycol  17 g Oral Daily    potassium bicarbonate  20 mEq Oral Once    potassium chloride  40 mEq Intravenous Once    sodium bicarbonate  650 mg Oral BID    tiotropium bromide  2 puff Inhalation Daily        Current Facility-Administered Medications:     0.9%  NaCl infusion (for blood administration), , Intravenous, Q24H PRN    acetaminophen, 1,000 mg, Oral, Q6H PRN    albuterol, 2 puff, Inhalation, Q6H PRN    albuterol-ipratropium, 3 mL, Nebulization, TID PRN    ALPRAZolam, 0.5 mg, Oral, TID PRN    diphenhydrAMINE, 25 mg, Oral, Q6H PRN    loperamide, 2 mg, Oral, QID PRN    morphine, 2 mg, Intravenous, Q4H PRN    ondansetron, 4 mg, Intravenous, Q4H PRN    oxyCODONE-acetaminophen, 1 tablet, Oral, Q6H PRN    Flushing PICC/Midline Protocol, , , Until Discontinued **AND** sodium chloride 0.9%, 10 mL, Intravenous, Q12H PRN     Assessment/Plan:    Fall- displaced left femoral neck fracture s/p Arthroplasty  Fever- resolved cultures negative  Post operative ileus- improving  Diarrhea- resolving  Severe malnutrition  Severe hypokalemia  Hypomagnesemia-improving    Hx: COPD, Lung cancer s/p lobectomy, h/o Osteo right ankle, hepC, Anxiety/depression    Plan:  -replace potassium aggressively.  Continue telemetry and electrolyte monitoring .  Repeat labs tomorrow  -hemoglobin improved with transfusion.  Monitor periodically while inpatient   -advance diet as tolerated.  Encourage mobilization with PT.  -continue p.o. bicarb  -bowel regimen   -not on antibiotics.  Low suspicion for sepsis  -she will need rehab placement are  -other home medications were reviewed and renewed    Lovenox          Benjamin Naranjo MD

## 2025-03-03 NOTE — PLAN OF CARE
Updates sent to \[facility]\"" via [employer]. Pt still 1:1 and not medically ready to d/c at this time. Once pt is stable. [facility] plans to submit for auth for skilled placement.     Tomasa Carson LCSW    3/5/25 note  Pt not 1:1. Note above incorrect.

## 2025-03-03 NOTE — PROGRESS NOTES
Inpatient Nutrition Assessment    Admit Date: 2/21/2025   Total duration of encounter: 10 days   Patient Age: 64 y.o.    Nutrition Recommendation/Prescription     -Continue Regular Diet as tolerated.   -Boost Glucose Control; 190 kcal, 16 gm protein per container   -Monitor wt, labs, and intake.     Communication of Recommendations: reviewed with patient    Nutrition Assessment     Malnutrition Assessment/Nutrition-Focused Physical Exam    Malnutrition Context: chronic illness (02/28/25 1445)  Malnutrition Level: severe (02/28/25 1445)  Energy Intake (Malnutrition): less than or equal to 75% for greater than or equal to 1 month (02/28/25 1445)  Weight Loss (Malnutrition): other (see comments) (Does not meet criteria) (02/28/25 1445)  Subcutaneous Fat (Malnutrition): severe depletion (02/28/25 1445)  Orbital Region (Subcutaneous Fat Loss): severe depletion  Upper Arm Region (Subcutaneous Fat Loss): severe depletion     Muscle Mass (Malnutrition): severe depletion (02/28/25 1445)  Confucianist Region (Muscle Loss): severe depletion  Clavicle Bone Region (Muscle Loss): severe depletion        Dorsal Hand (Muscle Loss): severe depletion           Fluid Accumulation (Malnutrition): other (see comments) (Not present) (02/28/25 1445)        A minimum of two characteristics is recommended for diagnosis of either severe or non-severe malnutrition.    Chart Review    Reason Seen: malnutrition screening tool (MST)    Malnutrition Screening Tool Results   Have you recently lost weight without trying?: No  Have you been eating poorly because of a decreased appetite?: No   MST Score: 0   Diagnosis:  1.-fall from bed   2.-left femoral neck fracture with shortening  -status post left hip hemiarthroplasty 02/22/2025  - will add percocet 10/325 mgs po q 6 hours for pain / morphine 2 mgs for breakthrough   3-hypokalemia-replaced    Relevant Medical History: Anxiety, Cataract (7/26/2023), Closed nondisplaced pilon fracture of right tibia  with routine healing, COPD (chronic obstructive pulmonary disease), Depression, Draining cutaneous sinus tract (8/2/2022), Emphysema lung, History of lung cancer (10/4/2022), Lung cancer (2017), Maxillary sinusitis (12/15/2022), Osteomyelitis of right ankle (7/26/2023), and Primary malignant neoplasm of lung (7/26/2023)     Scheduled Medications:  cetirizine, 10 mg, Daily  enoxparin, 40 mg, Q24H (prophylaxis, 1700)  famotidine, 40 mg, Daily  ferrous sulfate, 1 tablet, Daily  fluticasone furoate-vilanteroL, 1 puff, Daily  Lactobacillus acidophilus, 1 capsule, TID WM  magnesium oxide, 400 mg, BID  oxybutynin, 5 mg, TID  polyethylene glycol, 17 g, Daily  potassium bicarbonate, 20 mEq, Once  sodium bicarbonate, 650 mg, BID  tiotropium bromide, 2 puff, Daily    Continuous Infusions:  0.9% NaCl, Last Rate: 50 mL/hr at 03/02/25 2230    PRN Medications:  0.9%  NaCl infusion (for blood administration), , Q24H PRN  acetaminophen, 1,000 mg, Q6H PRN  albuterol, 2 puff, Q6H PRN  albuterol-ipratropium, 3 mL, TID PRN  ALPRAZolam, 0.5 mg, TID PRN  diphenhydrAMINE, 25 mg, Q6H PRN  loperamide, 2 mg, QID PRN  morphine, 2 mg, Q4H PRN  ondansetron, 4 mg, Q4H PRN  oxyCODONE-acetaminophen, 1 tablet, Q6H PRN  sodium chloride 0.9%, 10 mL, Q12H PRN    Calorie Containing IV Medications: no significant kcals from medications at this time    Recent Labs   Lab 02/25/25  0452 02/26/25  0544 02/27/25  0750 02/28/25  0402 03/01/25  0449 03/02/25  0417 03/02/25  0509 03/03/25  0430     --  140 145 142 139  --  141   K 4.2  --  2.5* 3.1* 2.6* 3.0*  --  2.8*   CALCIUM 8.5  --  7.4* 7.4* 7.5* 7.6*  --  7.4*   MG 1.70  --  1.60  --  1.50* 1.70  --  1.90     --  111* 118* 114* 111*  --  113*   CO2 27  --  22* 20* 19* 21*  --  22*   BUN 6.3*  --  6.8* 3.5* <3.0* <3.0*  --  <3.0*   CREATININE 0.60  --  0.55 0.54* 0.56 0.54*  --  0.54*   EGFRNORACEVR >60  --  >60 >60 >60 >60  --  >60   GLUCOSE 111  --  94 82 87 92  --  109   BILITOT 0.3  --  0.4  " --  0.2 0.5  --  0.3   ALKPHOS 71  --  68  --  64 68  --  80   ALT 12  --  14  --  11 12  --  14   AST 23  --  26  --  22 21  --  29   ALBUMIN 2.2*  --  1.9*  --  1.7* 1.9*  --  1.8*   WBC 9.18 11.77* 7.08 6.55 7.76  --  7.54 7.36   HGB 8.2* 9.3* 8.0* 8.3* 7.4*  --  10.4* 10.6*   HCT 26.4* 30.3* 25.6* 26.9* 24.5*  --  32.5* 33.3*     Nutrition Orders:  Diet Adult Regular Standard Tray  Dietary nutrition supplements TID; Boost Glucose Control - Any flavor    Appetite/Oral Intake: fair/50-75% of meals  Factors Affecting Nutritional Intake: constipation and decreased appetite  Social Needs Impacting Access to Food: none identified  Food/Hinduism/Cultural Preferences: none reported  Food Allergies: no known food allergies  Last Bowel Movement: 25  Wound(s):  surgical incision     Comments    25: Pt reports decreased appetite/intake for the past year with wt loss; pt denies n/v; pt receptive to oral supplement with meals.     25 Pt had been NPO off and on for 2 days, advanced to regular diet today, pt reports being hungry and asking for oral supplements with meals to continue.    3/3/25 Pt working with therapy, nurse reports appetite improved, drinking the boost vhc but says it is causing loose stools so will try a different variety; pt likes them and would like to continue drinking them if possible.    Anthropometrics    Height: 5' 5.98" (167.6 cm),    Last Weight: 52.2 kg (115 lb 1.3 oz) (25 1013), Weight Method: Standard Scale  BMI (Calculated): 18.6  BMI Classification: normal (BMI 18.5-24.9)     Ideal Body Weight (IBW), Female: 129.9 lb     % Ideal Body Weight, Female (lb): 88.59 %                    Usual Body Weight (UBW), k.36 kg  % Usual Body Weight: 85.25  % Weight Change From Usual Weight: -14.93 %  Usual Weight Provided By: patient    Wt Readings from Last 5 Encounters:   25 52.2 kg (115 lb 1.3 oz)   25 49 kg (108 lb)   24 53.5 kg (118 lb)   23 52.6 kg (116 " lb)   09/20/23 51.7 kg (114 lb)     Weight Change(s) Since Admission:   Wt Readings from Last 1 Encounters:   02/24/25 1013 52.2 kg (115 lb 1.3 oz)   02/21/25 1728 52.2 kg (115 lb 1.3 oz)   02/21/25 1249 52.2 kg (115 lb)   Admit Weight: 52.2 kg (115 lb) (02/21/25 1249), Weight Method: Standard Scale    Estimated Needs    Weight Used For Calorie Calculations: 52.2 kg (115 lb 1.3 oz)  Energy Calorie Requirements (kcal): 6091-3663 kcal (30-35 kcal/kg)  Energy Need Method: Kcal/kg  Weight Used For Protein Calculations: 52.2 kg (115 lb 1.3 oz)  Protein Requirements: 78 gm (1.5g/kg)  Fluid Requirements (mL): 1566 mL        Enteral Nutrition     Patient not receiving enteral nutrition at this time.    Parenteral Nutrition     Patient not receiving parenteral nutrition support at this time.    Evaluation of Received Nutrient Intake    Calories: meeting estimated needs  Protein: meeting estimated needs    Patient Education     Not applicable.    Nutrition Diagnosis     PES: Inadequate oral intake related to chronic illness as evidenced by pt report of poor appetite/intake x 1 year. (active)     PES: Severe chronic disease or condition related malnutrition Related to chronic illness, suboptimal po intake As Evidenced by:  - energy intake: <= 75% for 12 months (meets criteria for <= 75% >= 1 month (severe - chronic)) - muscle mass depletion: 5 areas of severe muscle loss (Clavicle, Pectoralis, Temporalis, Interosseous, Trapezius) - loss of subcutaneous fat: 3 areas of severe fat loss (Triceps Skinfold, Infraorbital, Buccal) active    Nutrition Interventions     Intervention(s): general/healthful diet, commercial beverage, prescription medication, and collaboration with other providers  Intervention(s): Oral nutritional supplement    Goal: Meet greater than 80% of nutritional needs by follow-up. (goal progressing)  Goal: Maintain weight throughout hospitalization. (goal progressing)    Nutrition Goals & Monitoring     Dietitian  will monitor: energy intake and weight  Discharge planning: continue Regular diet with Boost Plus oral supplements  Nutrition Risk/Follow-Up: high (follow-up in 1-4 days)   Please consult if re-assessment needed sooner.

## 2025-03-04 LAB
ALBUMIN SERPL-MCNC: 1.8 G/DL (ref 3.4–4.8)
ALBUMIN/GLOB SERPL: 0.5 RATIO (ref 1.1–2)
ALP SERPL-CCNC: 107 UNIT/L (ref 40–150)
ALT SERPL-CCNC: 16 UNIT/L (ref 0–55)
ANION GAP SERPL CALC-SCNC: 12 MEQ/L
AST SERPL-CCNC: 31 UNIT/L (ref 5–34)
BILIRUB SERPL-MCNC: 0.3 MG/DL
BUN SERPL-MCNC: <3 MG/DL (ref 9.8–20.1)
CALCIUM SERPL-MCNC: 8 MG/DL (ref 8.4–10.2)
CHLORIDE SERPL-SCNC: 111 MMOL/L (ref 98–107)
CO2 SERPL-SCNC: 22 MMOL/L (ref 23–31)
CREAT SERPL-MCNC: 0.54 MG/DL (ref 0.55–1.02)
CREAT/UREA NIT SERPL: <6
GFR SERPLBLD CREATININE-BSD FMLA CKD-EPI: >60 ML/MIN/1.73/M2
GLOBULIN SER-MCNC: 3.5 GM/DL (ref 2.4–3.5)
GLUCOSE SERPL-MCNC: 100 MG/DL (ref 82–115)
MAGNESIUM SERPL-MCNC: 1.8 MG/DL (ref 1.6–2.6)
POTASSIUM SERPL-SCNC: 3.7 MMOL/L (ref 3.5–5.1)
PROT SERPL-MCNC: 5.3 GM/DL (ref 5.8–7.6)
SODIUM SERPL-SCNC: 145 MMOL/L (ref 136–145)

## 2025-03-04 PROCEDURE — 63600175 PHARM REV CODE 636 W HCPCS: Performed by: INTERNAL MEDICINE

## 2025-03-04 PROCEDURE — 25000242 PHARM REV CODE 250 ALT 637 W/ HCPCS: Performed by: INTERNAL MEDICINE

## 2025-03-04 PROCEDURE — 80053 COMPREHEN METABOLIC PANEL: CPT | Performed by: INTERNAL MEDICINE

## 2025-03-04 PROCEDURE — 25000003 PHARM REV CODE 250: Performed by: INTERNAL MEDICINE

## 2025-03-04 PROCEDURE — 97164 PT RE-EVAL EST PLAN CARE: CPT

## 2025-03-04 PROCEDURE — 25000003 PHARM REV CODE 250

## 2025-03-04 PROCEDURE — 36415 COLL VENOUS BLD VENIPUNCTURE: CPT | Performed by: INTERNAL MEDICINE

## 2025-03-04 PROCEDURE — 21400001 HC TELEMETRY ROOM

## 2025-03-04 PROCEDURE — 83735 ASSAY OF MAGNESIUM: CPT | Performed by: INTERNAL MEDICINE

## 2025-03-04 PROCEDURE — 27000221 HC OXYGEN, UP TO 24 HOURS

## 2025-03-04 RX ORDER — IBUPROFEN 400 MG/1
400 TABLET ORAL EVERY 6 HOURS PRN
Status: DISCONTINUED | OUTPATIENT
Start: 2025-03-04 | End: 2025-03-07 | Stop reason: HOSPADM

## 2025-03-04 RX ORDER — DRONABINOL 2.5 MG/1
2.5 CAPSULE ORAL
Status: DISCONTINUED | OUTPATIENT
Start: 2025-03-04 | End: 2025-03-07 | Stop reason: HOSPADM

## 2025-03-04 RX ORDER — AMOXICILLIN 250 MG
2 CAPSULE ORAL 2 TIMES DAILY
Status: DISCONTINUED | OUTPATIENT
Start: 2025-03-04 | End: 2025-03-07 | Stop reason: HOSPADM

## 2025-03-04 RX ADMIN — SODIUM BICARBONATE 650 MG TABLET 650 MG: at 09:03

## 2025-03-04 RX ADMIN — FAMOTIDINE 40 MG: 20 TABLET, FILM COATED ORAL at 09:03

## 2025-03-04 RX ADMIN — ENOXAPARIN SODIUM 40 MG: 40 INJECTION SUBCUTANEOUS at 05:03

## 2025-03-04 RX ADMIN — FERROUS SULFATE TAB 325 MG (65 MG ELEMENTAL FE) 1 EACH: 325 (65 FE) TAB at 09:03

## 2025-03-04 RX ADMIN — IBUPROFEN 400 MG: 400 TABLET, FILM COATED ORAL at 03:03

## 2025-03-04 RX ADMIN — FLUTICASONE FUROATE AND VILANTEROL TRIFENATATE 1 PUFF: 100; 25 POWDER RESPIRATORY (INHALATION) at 09:03

## 2025-03-04 RX ADMIN — SODIUM BICARBONATE 650 MG TABLET 650 MG: at 08:03

## 2025-03-04 RX ADMIN — Medication 400 MG: at 09:03

## 2025-03-04 RX ADMIN — ACETAMINOPHEN 1000 MG: 500 TABLET ORAL at 11:03

## 2025-03-04 RX ADMIN — CETIRIZINE HYDROCHLORIDE 10 MG: 10 TABLET, FILM COATED ORAL at 09:03

## 2025-03-04 RX ADMIN — DRONABINOL 2.5 MG: 2.5 CAPSULE ORAL at 03:03

## 2025-03-04 RX ADMIN — OXYCODONE AND ACETAMINOPHEN 1 TABLET: 10; 325 TABLET ORAL at 07:03

## 2025-03-04 RX ADMIN — OXYBUTYNIN CHLORIDE 5 MG: 5 TABLET ORAL at 09:03

## 2025-03-04 RX ADMIN — Medication 1 CAPSULE: at 07:03

## 2025-03-04 RX ADMIN — TIOTROPIUM BROMIDE INHALATION SPRAY 2 PUFF: 3.12 SPRAY, METERED RESPIRATORY (INHALATION) at 05:03

## 2025-03-04 RX ADMIN — DRONABINOL 2.5 MG: 2.5 CAPSULE ORAL at 10:03

## 2025-03-04 NOTE — PT/OT/SLP RE-EVAL
Physical Therapy Re-Evaluation    Patient Name:  Lauren Ring   MRN:  [id number]    Recommendations:     Discharge therapy intensity: Moderate Intensity Therapy   Discharge Equipment Recommendations:  (TBD by next level of care)   Barriers to discharge: None    Assessment:     Lauren Ring is a 64 y.o. female admitted with a medical diagnosis of  L displaced femoral neck fx following a fall 2 weeks ago; now s/p L hip hemiarthroplasty on 2/22.  She presents with the following impairments/functional limitations: weakness, impaired endurance, impaired self care skills, impaired functional mobility, gait instability, impaired balance, decreased lower extremity function, decreased safety awareness, pain.    Patient making progress with PT. MIN A- CGA for all mobility. Endurance and pain are her biggest limiting factors. Continuing to recommend MOD intensity therapy at discharge. Progress as tolerated.     Rehab Prognosis: Good; patient would benefit from acute skilled PT services to address these deficits and reach maximum level of function.    Recent Surgery: Procedure(s) (LRB):  HEMIARTHROPLASTY, HIP, POSTERIOR APPROACH (Left) 10 Days Post-Op    Plan:     During this hospitalization, patient would benefit from acute PT services 6 x/week to address the identified rehab impairments via gait training, therapeutic activities, therapeutic exercises, neuromuscular re-education and progress toward the following goals:    Plan of Care Expires:  03/23/25    PT/PTA conference to discuss PT POC and patient's progression towards goals held with Sarah Lopez PTA.     Subjective     Chief Complaint: pain  Patient/Family Comments/goals: none  Pain/Comfort:  Pain Rating 1: 9/10  Location - Side 1: Left  Location 1: hip  Pain Addressed 1: Reposition, Distraction  Pain Rating Post-Intervention 1: 9/10    Patients cultural, spiritual, Congregation conflicts given the current situation: no    Objective:     Communicated with nurse prior  to session.  Patient found supine with oxygen, peripheral IV  upon PT entry to room.    General Precautions: Standard, fall  Orthopedic Precautions:LLE weight bearing as tolerated, LLE posterior precautions   Braces: N/A  Respiratory Status: Nasal cannula, flow 2 L/min. Taken off for session. Sats 93%-96% during session    Exams:  Cognitive Exam:  Patient is oriented to Person, Place, Time, and Situation  Sensation: -       Intact  RLE ROM: WFL  RLE Strength: WFL  LLE ROM: WFL  LLE Strength: -4/5 grossly  Skin integrity: Visible skin intact      Functional Mobility:  Bed Mobility:  Supine to Sit: stand by assistance  Transfers:  Sit to Stand:  minimum assistance with rolling walker  Gait: 15 ft x 2 with RW & CGA. Slowed pace. Occasional cueing for safety. Limited by pain and SOB  Balance: fair/poor      AM-PAC 6 CLICK MOBILITY  Total Score:19     Education Provided:  Role and goals of PT, transfer training, bed mobility, gait training, balance training, safety awareness, assistive device, strengthening exercises, and importance of participating in PT to return to PLOF.    Patient left up in chair with all lines intact, call button in reach, and educated on calling for assistance when ready to return to bed .    GOALS:   Multidisciplinary Problems       Physical Therapy Goals          Problem: Physical Therapy    Goal Priority Disciplines Outcome Interventions   Physical Therapy Goal     PT, PT/OT Progressing    Description: Goals to be met by: 3/23/25     Patient will increase functional independence with mobility by performin. Supine to sit with Set-up [place]  2. Sit to supine with Set-up [place]  3. Sit to stand transfer with Supervision  4. Bed to chair transfer with Supervision using Rolling Walker  5. Gait  x 200 feet with Supervision using Rolling Walker.                            History:     Past Medical History:   Diagnosis Date    Anxiety     Cataract 2023    Closed nondisplaced  pilon fracture of right tibia with routine healing     COPD (chronic obstructive pulmonary disease)     Depression     Draining cutaneous sinus tract 8/2/2022    Emphysema lung     History of lung cancer 10/4/2022    Lung cancer 2017    Maxillary sinusitis 12/15/2022    Osteomyelitis of right ankle 7/26/2023    Primary malignant neoplasm of lung 7/26/2023       Past Surgical History:   Procedure Laterality Date    CARPAL TUNNEL RELEASE Bilateral     CATARACT EXTRACTION Bilateral     HEMIARTHROPLASTY, HIP, POSTERIOR APPROACH Left 2/22/2025    Procedure: HEMIARTHROPLASTY, HIP, POSTERIOR APPROACH;  Surgeon: Clive Martin Jr., MD;  Location: [facility];  Service: Orthopedics;  Laterality: Left;    HYSTERECTOMY      OPEN REDUCTION AND INTERNAL FIXATION (ORIF) OF INJURY OF ANKLE Right     THORACOTOMY      TONSILLECTOMY         Time Tracking:     PT Received On: 03/04/25  PT Start Time: 0930     PT Stop Time: 0950  PT Total Time (min): 20 min     Billable Minutes: Re-eval 20 minutes      03/04/2025

## 2025-03-04 NOTE — PROGRESS NOTES
[facility]  Hospital Medicine Progress Note        Chief Complaint: Inpatient Follow-up for fracture    HPI:   Lauren Ring is a 64 y.o. 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 The patient presented to [facility] on 2/21/2025 with a primary complaint of pain to left hip after falling from bed around 2 weeks ago.  Patient was seen at emergency room [facility] and diagnosed with displaced left femoral neck fracture with a shortening.  Patient has been seen by Orthopedics with plans for left hip arthroplasty tomorrow.  Additional history includes hepatitis-C treated as well as lung cancer status post lobectomy.  Patient is supposed to be on O2 but not currently.  We will go ahead and place her on tele.  Patient voices no complaints.  No distress and pain appears to be controlled.  Physical examination her left leg is shortened and laterally displaced.        02/22/2025 Dr. Steinberg-patient is status post left hip hemiarthroplasty without complications.  She remains alert/active and oriented and requesting food.        2/23/25 dr steinberg - nica /  stable . Looks great . Wants better pain control      02/24/2025 Dr. Steinberg-chart reviewed patient examined she voices no complaints.  Tolerating physical therapy better.  Pain better controlled.  We will draw labs for tomorrow in  is working on discharge planning/placement     02/25/2025 Dr. Steinberg-still low to progress requiring assistance for ambulation.   looking into skilled nursing facility.   Voices no complaints, no distress     02/26/2025 Dr. Steinberg-chart reviewed patient examined.  She was doing well last night when she was last seen.  Now with nausea/vomiting/diarrhea/fever/tender surgical site with some erythema and induration as well as right upper extremity medial aspect where previous IV site was with erythema and induration.             Interval Hx:   Seen and examined this morning.  Appetite improved she states but looking at her tray not much eaten.  Drank about 75% of her boost.  I encouraged her for increased oral intake.  Lab work indicating poor oral intake.    Objective/physical exam:  Vitals:    03/04/25 0402 03/04/25 0711 03/04/25 0729 03/04/25 0902   BP: 106/61 111/70     Pulse: 100 97  98   Resp:  20 20 20   Temp: 98.2 °F (36.8 °C) 99 °F (37.2 °C)     TempSrc: Oral Oral     SpO2: 95% (!) 92%  (!) 94%   Weight:       Height:         General: In no acute distress, afebrile  Respiratory: Clear to auscultation bilaterally  Cardiovascular: S1, S2, no appreciable murmur  Abdomen: Soft, nontender, BS +  MSK: Warm, no lower extremity edema, no clubbing or cyanosis  Neurologic: Alert and oriented x4, moving all extremities with good strength     Lab Results   Component Value Date     03/04/2025    K 3.7 03/04/2025     (H) 03/04/2025    CO2 22 (L) 03/04/2025    BUN <3.0 (L) 03/04/2025    CREATININE 0.54 (L) 03/04/2025    CALCIUM 8.0 (L) 03/04/2025    EGFRNONAA >60 07/31/2022      Lab Results   Component Value Date    ALT 16 03/04/2025    AST 31 03/04/2025    ALKPHOS 107 03/04/2025    BILITOT 0.3 03/04/2025      Lab Results   Component Value Date    WBC 7.36 03/03/2025    HGB 10.6 (L) 03/03/2025    HCT 33.3 (L) 03/03/2025    MCV 84.9 03/03/2025     03/03/2025           Medications:   cetirizine  10 mg Oral Daily    enoxparin  40 mg Subcutaneous Q24H (prophylaxis, 1700)    famotidine  40 mg Oral Daily    ferrous sulfate  1 tablet Oral Daily    fluticasone furoate-vilanteroL  1  puff Inhalation Daily    Lactobacillus acidophilus  1 capsule Oral TID WM    magnesium oxide  400 mg Oral BID    oxybutynin  5 mg Oral TID    polyethylene glycol  17 g Oral Daily    sodium bicarbonate  650 mg Oral BID    tiotropium bromide  2 puff Inhalation Daily        Current Facility-Administered Medications:     0.9%  NaCl infusion (for blood administration), , Intravenous, Q24H PRN    acetaminophen, 1,000 mg, Oral, Q6H PRN    albuterol, 2 puff, Inhalation, Q6H PRN    albuterol-ipratropium, 3 mL, Nebulization, TID PRN    ALPRAZolam, 0.5 mg, Oral, TID PRN    diphenhydrAMINE, 25 mg, Oral, Q6H PRN    loperamide, 2 mg, Oral, QID PRN    morphine, 2 mg, Intravenous, Q4H PRN    ondansetron, 4 mg, Intravenous, Q4H PRN    oxyCODONE-acetaminophen, 1 tablet, Oral, Q6H PRN    Flushing PICC/Midline Protocol, , , Until Discontinued **AND** sodium chloride 0.9%, 10 mL, Intravenous, Q12H PRN     Assessment/Plan:    Fall- displaced left femoral neck fracture s/p Arthroplasty  Fever- resolved cultures negative  Post operative ileus- improving  Diarrhea- resolving  Severe malnutrition  Severe hypokalemia  Hypomagnesemia-improving    Hx: COPD, Lung cancer s/p lobectomy, h/o Osteo right ankle, hepC, Anxiety/depression      Continue PT and OT, recommending SNF.  -other home medications were reviewed and renewed  Discontinue oxybutynin while in the hospital  Start Marinol for appetite stimulant  Case management following.  [facility] in [facility]la plans to submit once medically stable.  They can go ahead and submit, otherwise medically stable.    Lovenox

## 2025-03-04 NOTE — PLAN OF CARE
Problem: Physical Therapy  Goal: Physical Therapy Goal  Description: Goals to be met by: 3/23/25     Patient will increase functional independence with mobility by performin. Supine to sit with Set-up [place]  2. Sit to supine with Set-up [place]  3. Sit to stand transfer with Supervision  4. Bed to chair transfer with Supervision using Rolling Walker  5. Gait  x 200 feet with Supervision using Rolling Walker.       Outcome: Progressing

## 2025-03-05 PROCEDURE — 25000003 PHARM REV CODE 250: Performed by: INTERNAL MEDICINE

## 2025-03-05 PROCEDURE — 97116 GAIT TRAINING THERAPY: CPT | Mod: CQ

## 2025-03-05 PROCEDURE — 97530 THERAPEUTIC ACTIVITIES: CPT | Mod: CQ

## 2025-03-05 PROCEDURE — 97535 SELF CARE MNGMENT TRAINING: CPT | Mod: CO

## 2025-03-05 PROCEDURE — 21400001 HC TELEMETRY ROOM

## 2025-03-05 PROCEDURE — 63600175 PHARM REV CODE 636 W HCPCS: Performed by: INTERNAL MEDICINE

## 2025-03-05 PROCEDURE — 25000242 PHARM REV CODE 250 ALT 637 W/ HCPCS: Performed by: INTERNAL MEDICINE

## 2025-03-05 RX ORDER — GABAPENTIN 300 MG/1
300 CAPSULE ORAL 2 TIMES DAILY
Status: DISCONTINUED | OUTPATIENT
Start: 2025-03-05 | End: 2025-03-07 | Stop reason: HOSPADM

## 2025-03-05 RX ADMIN — GABAPENTIN 300 MG: 300 CAPSULE ORAL at 10:03

## 2025-03-05 RX ADMIN — FERROUS SULFATE TAB 325 MG (65 MG ELEMENTAL FE) 1 EACH: 325 (65 FE) TAB at 09:03

## 2025-03-05 RX ADMIN — SENNOSIDES AND DOCUSATE SODIUM 2 TABLET: 50; 8.6 TABLET ORAL at 10:03

## 2025-03-05 RX ADMIN — OXYCODONE AND ACETAMINOPHEN 1 TABLET: 10; 325 TABLET ORAL at 07:03

## 2025-03-05 RX ADMIN — DRONABINOL 2.5 MG: 2.5 CAPSULE ORAL at 04:03

## 2025-03-05 RX ADMIN — SODIUM BICARBONATE 650 MG TABLET 650 MG: at 09:03

## 2025-03-05 RX ADMIN — SENNOSIDES AND DOCUSATE SODIUM 2 TABLET: 50; 8.6 TABLET ORAL at 09:03

## 2025-03-05 RX ADMIN — POLYETHYLENE GLYCOL 3350 17 G: 17 POWDER, FOR SOLUTION ORAL at 09:03

## 2025-03-05 RX ADMIN — TIOTROPIUM BROMIDE INHALATION SPRAY 2 PUFF: 3.12 SPRAY, METERED RESPIRATORY (INHALATION) at 04:03

## 2025-03-05 RX ADMIN — ENOXAPARIN SODIUM 40 MG: 40 INJECTION SUBCUTANEOUS at 04:03

## 2025-03-05 RX ADMIN — SODIUM BICARBONATE 650 MG TABLET 650 MG: at 10:03

## 2025-03-05 RX ADMIN — FLUTICASONE FUROATE AND VILANTEROL TRIFENATATE 1 PUFF: 100; 25 POWDER RESPIRATORY (INHALATION) at 09:03

## 2025-03-05 RX ADMIN — OXYCODONE AND ACETAMINOPHEN 1 TABLET: 10; 325 TABLET ORAL at 10:03

## 2025-03-05 RX ADMIN — DRONABINOL 2.5 MG: 2.5 CAPSULE ORAL at 07:03

## 2025-03-05 RX ADMIN — OXYCODONE AND ACETAMINOPHEN 1 TABLET: 10; 325 TABLET ORAL at 11:03

## 2025-03-05 RX ADMIN — CETIRIZINE HYDROCHLORIDE 10 MG: 10 TABLET, FILM COATED ORAL at 09:03

## 2025-03-05 NOTE — PROGRESS NOTES
[facility]  Hospital Medicine Progress Note        Chief Complaint: Inpatient Follow-up for fracture    HPI:   Lauren Ring is a 64 y.o. 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 The patient presented to [facility] on 2/21/2025 with a primary complaint of pain to left hip after falling from bed around 2 weeks ago.  Patient was seen at emergency room [facility] and diagnosed with displaced left femoral neck fracture with a shortening.  Patient has been seen by Orthopedics with plans for left hip arthroplasty tomorrow.  Additional history includes hepatitis-C treated as well as lung cancer status post lobectomy.  Patient is supposed to be on O2 but not currently.  We will go ahead and place her on tele.  Patient voices no complaints.  No distress and pain appears to be controlled.  Physical examination her left leg is shortened and laterally displaced.        02/22/2025 Dr. Steinberg-patient is status post left hip hemiarthroplasty without complications.  She remains alert/active and oriented and requesting food.        2/23/25 dr steinberg - nica /  stable . Looks great . Wants better pain control      02/24/2025 Dr. Steinberg-chart reviewed patient examined she voices no complaints.  Tolerating physical therapy better.  Pain better controlled.  We will draw labs for tomorrow in  is working on discharge planning/placement     02/25/2025 Dr. Steinbreg-still low to progress requiring assistance for ambulation.   looking into skilled nursing facility.   Voices no complaints, no distress     02/26/2025 Dr. Steinberg-chart reviewed patient examined.  She was doing well last night when she was last seen.  Now with nausea/vomiting/diarrhea/fever/tender surgical site with some erythema and induration as well as right upper extremity medial aspect where previous IV site was with erythema and induration.             Interval Hx:   Pending insurance authorization, complains of pain to the bilateral legs.        Objective/physical exam:  Vitals:    03/05/25 0000 03/05/25 0419 03/05/25 0709 03/05/25 0714   BP:  122/78  129/85   Pulse:  81  78   Resp: 18 16 18 18   Temp:  97.9 °F (36.6 °C)  97.8 °F (36.6 °C)   TempSrc:  Oral  Oral   SpO2:  97%  (!) 94%   Weight:       Height:         General: In no acute distress, afebrile  Respiratory: Clear to auscultation bilaterally  Cardiovascular: S1, S2, no appreciable murmur  Abdomen: Soft, nontender, BS +  MSK: Warm, no lower extremity edema, no clubbing or cyanosis  Neurologic: Alert and oriented x4, moving all extremities with good strength     Lab Results   Component Value Date     03/04/2025    K 3.7 03/04/2025     (H) 03/04/2025    CO2 22 (L) 03/04/2025    BUN <3.0 (L) 03/04/2025    CREATININE 0.54 (L) 03/04/2025    CALCIUM 8.0 (L) 03/04/2025    EGFRNONAA >60 07/31/2022      Lab Results   Component Value Date    ALT 16 03/04/2025    AST 31 03/04/2025    ALKPHOS 107 03/04/2025    BILITOT 0.3 03/04/2025      Lab Results   Component Value Date    WBC 7.36 03/03/2025    HGB 10.6 (L) 03/03/2025    HCT 33.3 (L) 03/03/2025    MCV 84.9 03/03/2025     03/03/2025           Medications:   cetirizine  10 mg Oral Daily    droNABinol  2.5 mg Oral BID AC    enoxparin  40 mg Subcutaneous Q24H (prophylaxis, 1700)    ferrous sulfate  1 tablet Oral Daily    fluticasone furoate-vilanteroL  1 puff Inhalation Daily    polyethylene glycol  17 g Oral Daily    senna-docusate 8.6-50 mg  2 tablet Oral BID    sodium bicarbonate  650 mg Oral  BID    tiotropium bromide  2 puff Inhalation Daily        Current Facility-Administered Medications:     0.9%  NaCl infusion (for blood administration), , Intravenous, Q24H PRN    acetaminophen, 1,000 mg, Oral, Q6H PRN    albuterol, 2 puff, Inhalation, Q6H PRN    albuterol-ipratropium, 3 mL, Nebulization, TID PRN    ALPRAZolam, 0.5 mg, Oral, TID PRN    diphenhydrAMINE, 25 mg, Oral, Q6H PRN    ibuprofen, 400 mg, Oral, Q6H PRN    loperamide, 2 mg, Oral, QID PRN    ondansetron, 4 mg, Intravenous, Q4H PRN    oxyCODONE-acetaminophen, 1 tablet, Oral, Q6H PRN    Flushing PICC/Midline Protocol, , , Until Discontinued **AND** sodium chloride 0.9%, 10 mL, Intravenous, Q12H PRN     Assessment/Plan:    Fall- displaced left femoral neck fracture s/p Arthroplasty  Fever- resolved cultures negative  Post operative ileus- improving  Diarrhea- resolving  Severe malnutrition  Severe hypokalemia  Hypomagnesemia-improving    Hx: COPD, Lung cancer s/p lobectomy, h/o Osteo right ankle, hepC, Anxiety/depression      Continue PT and OT, recommending SNF.  -other home medications were reviewed and renewed  Discontinue oxybutynin while in the hospital  Start Marinol for appetite stimulant  Trial of gabapentin  Case management following.  Pending insurance authorization from [facility].    Lovenox

## 2025-03-05 NOTE — PT/OT/SLP PROGRESS
Attempted to see pt for OT session this AM. Per RN, awaiting ultrasound to r/o DVT in LLE. Will hold and f/u as appropriate.

## 2025-03-05 NOTE — PT/OT/SLP PROGRESS
Occupational Therapy   Treatment    Name: Lauren Ring  MRN: [id number]    Recommendations:     Recommended therapy intensity at discharge: Moderate Intensity Therapy   Discharge Equipment Recommendations:  to be determined by next level of care  Barriers to discharge:       Assessment:     Lauren Ring is a 64 y.o. female with a medical diagnosis of  L displaced femoral neck fx following a fall 2 weeks ago; now s/p L hip hemiarthroplasty on 2/22. Performance deficits affecting function are weakness, impaired endurance, impaired self care skills, impaired functional mobility, gait instability, impaired balance, decreased safety awareness, decreased lower extremity function, decreased upper extremity function, orthopedic precautions. Pt with improved activity tolerance this session.     Rehab Prognosis:  Good; patient would benefit from acute skilled OT services to address these deficits and reach maximum level of function.       Plan:     Patient to be seen 6 x/week to address the above listed problems via self-care/home management, therapeutic activities, therapeutic exercises  Plan of Care Expires: 03/23/25  Plan of Care Reviewed with: patient    Subjective     Pain/Comfort:  Pain Rating 1: 0/10    Objective:     Communicated with: RN prior to session.  Patient found HOB elevated with oxygen, telemetry, peripheral IV upon OT entry to room.    General Precautions: Standard, fall    Orthopedic Precautions:LLE weight bearing as tolerated, LLE posterior precautions  Braces: N/A  Respiratory Status: Nasal cannula, flow 2 L/min     Occupational Performance:     Bed Mobility:    Patient completed Scooting/Bridging with stand by assistance  Patient completed Supine to Sit with stand by assistance     Functional Mobility/Transfers:  Patient completed Sit <> Stand Transfer with minimum assistance  with  rolling walker   Patient completed Toilet Transfer Step Transfer technique with minimum assistance with  grab  bars  Functional Mobility: ambulated to bathroom with Min-CGA and RW. Fatigues and becomes SOB quickly.     Activities of Daily Living:  Toileting: Min A for management of brief. SBA for seated pericare after +BM.     Therapeutic Positioning    OT interventions performed during the course of today's session in an effort to prevent and/or reduce acquired pressure injuries:   Education was provided on benefits of and recommendations for therapeutic positioning    [facility] 6 Click ADL: 19    Patient Education:  Patient provided with verbal education education regarding OT role/goals/POC and fall prevention.  Understanding was verbalized.      Patient left up in chair with all lines intact and call button in reach.    GOALS:   Multidisciplinary Problems       Occupational Therapy Goals          Problem: Occupational Therapy    Goal Priority Disciplines Outcome Interventions   Occupational Therapy Goal     OT, PT/OT Progressing    Description: LTG: Pt will perform basic ADLs and ADL transfers with Modified independence using LRAD by discharge.    STG: to be met by 3/23/25    Pt will complete grooming standing at sink with LRAD with SBA.  Pt will complete UB dressing with SBA.  Pt will complete LB dressing with SBA using LRAD and AE prn.  Pt will complete toileting with SBA using LRAD.  Pt will complete functional mobility to/from toilet and toilet transfer with SBA using LRAD.                        Time Tracking:     OT Date of Treatment: 03/05/25  OT Start Time: 1344  OT Stop Time: 1407  OT Total Time (min): 23 min    Billable Minutes:Self Care/Home Management 23    OT/KIMBERLEY: KIMBERLEY     Number of KIMBERLEY visits since last OT visit: 5    3/5/2025

## 2025-03-05 NOTE — PT/OT/SLP PROGRESS
Physical Therapy Treatment    Patient Name:  Lauren Ring   MRN:  [id number]    Recommendations:     Discharge therapy intensity: Moderate Intensity Therapy   Discharge Equipment Recommendations: to be determined by next level of care  Barriers to discharge: Inaccessible home, Decreased caregiver support, and Impaired mobility    Assessment:     Lauren Ring is a 64 y.o. female admitted with a medical diagnosis of .  She presents with the following impairments/functional limitations: weakness, impaired endurance, impaired self care skills, impaired functional mobility, gait instability, impaired balance, decreased lower extremity function, decreased safety awareness, pain L displaced femoral neck fx following a fall 2 weeks ago; now s/p L hip hemiarthroplasty on 2/22. .      Rehab Prognosis: Good; patient would benefit from acute skilled PT services to address these deficits and reach maximum level of function.    Recent Surgery: Procedure(s) (LRB):  HEMIARTHROPLASTY, HIP, POSTERIOR APPROACH (Left) 11 Days Post-Op    Plan:     During this hospitalization, patient would benefit from acute PT services 6 x/week to address the identified rehab impairments via gait training, therapeutic activities, therapeutic exercises, neuromuscular re-education and progress toward the following goals:    Plan of Care Expires:  03/23/25    Subjective     Chief Complaint:   Patient/Family Comments/goals:   Pain/Comfort:  Pain Rating 1: 0/10      Objective:     Communicated with NSG prior to session.  Patient found HOB elevated with oxygen, telemetry upon PT entry to room.     General Precautions: Standard, fall  Orthopedic Precautions: LLE weight bearing as tolerated, LLE posterior precautions  Braces: N/A  Respiratory Status: Nasal cannula, flow 2 L/min  Blood Pressure:   Skin Integrity: Visible skin intact      Functional Mobility:  Bed Mobility:     Scooting: stand by assistance  Supine to Sit: stand by assistance  Transfers:     Sit  to Stand:  minimum assistance with rolling walker and 1 trial from EOB,1 trial from toilet   Toilet Transfer: minimum assistance with  rolling walker  using  Step Transfer and    Gait: pt amb 12ft/12ft with RW Jose. Pt presents with short step-through gait pattern. Seated rest between trials .    Pt required increased time and rest for all activities.       Patient left up in chair with all lines intact and call button in reach    GOALS:   Multidisciplinary Problems       Physical Therapy Goals          Problem: Physical Therapy    Goal Priority Disciplines Outcome Interventions   Physical Therapy Goal     PT, PT/OT Progressing    Description: Goals to be met by: 3/23/25     Patient will increase functional independence with mobility by performin. Supine to sit with Set-up [place]  2. Sit to supine with Set-up [place]  3. Sit to stand transfer with Supervision  4. Bed to chair transfer with Supervision using Rolling Walker  5. Gait  x 200 feet with Supervision using Rolling Walker.                            Time Tracking:     PT Received On: 25  PT Start Time: 1346     PT Stop Time: 1409  PT Total Time (min): 23 min     Billable Minutes: Gait Training 15 and Therapeutic Activity 8    Treatment Type: Treatment  PT/PTA: PTA     Number of PTA visits since last PT visit: 2025

## 2025-03-05 NOTE — PLAN OF CARE
Sent updates to [facility] for them to submit for ins auth. Awaiting auth at this time.

## 2025-03-05 NOTE — PLAN OF CARE
Received call from [facility] stating they will submit for auth. If they have any questions, they will reach out to SHARIFA Chand or SHARIF Reynoso.     Tomasa Carson LCSW

## 2025-03-06 LAB
ALBUMIN SERPL-MCNC: 1.9 G/DL (ref 3.4–4.8)
ALBUMIN/GLOB SERPL: 0.6 RATIO (ref 1.1–2)
ALP SERPL-CCNC: 133 UNIT/L (ref 40–150)
ALT SERPL-CCNC: 10 UNIT/L (ref 0–55)
ANION GAP SERPL CALC-SCNC: 14 MEQ/L
AST SERPL-CCNC: 25 UNIT/L (ref 5–34)
BASOPHILS # BLD AUTO: 0.07 X10(3)/MCL
BASOPHILS NFR BLD AUTO: 0.8 %
BILIRUB SERPL-MCNC: 0.2 MG/DL
BUN SERPL-MCNC: 3.3 MG/DL (ref 9.8–20.1)
CALCIUM SERPL-MCNC: 8.1 MG/DL (ref 8.4–10.2)
CHLORIDE SERPL-SCNC: 106 MMOL/L (ref 98–107)
CO2 SERPL-SCNC: 25 MMOL/L (ref 23–31)
CREAT SERPL-MCNC: 0.58 MG/DL (ref 0.55–1.02)
CREAT/UREA NIT SERPL: 6
EOSINOPHIL # BLD AUTO: 0.27 X10(3)/MCL (ref 0–0.9)
EOSINOPHIL NFR BLD AUTO: 2.9 %
ERYTHROCYTE [DISTWIDTH] IN BLOOD BY AUTOMATED COUNT: 18.5 % (ref 11.5–17)
GFR SERPLBLD CREATININE-BSD FMLA CKD-EPI: >60 ML/MIN/1.73/M2
GLOBULIN SER-MCNC: 3.1 GM/DL (ref 2.4–3.5)
GLUCOSE SERPL-MCNC: 108 MG/DL (ref 82–115)
HCT VFR BLD AUTO: 37 % (ref 37–47)
HGB BLD-MCNC: 11.6 G/DL (ref 12–16)
IMM GRANULOCYTES # BLD AUTO: 0.1 X10(3)/MCL (ref 0–0.04)
IMM GRANULOCYTES NFR BLD AUTO: 1.1 %
LYMPHOCYTES # BLD AUTO: 2.18 X10(3)/MCL (ref 0.6–4.6)
LYMPHOCYTES NFR BLD AUTO: 23.6 %
MCH RBC QN AUTO: 26.8 PG (ref 27–31)
MCHC RBC AUTO-ENTMCNC: 31.4 G/DL (ref 33–36)
MCV RBC AUTO: 85.5 FL (ref 80–94)
MONOCYTES # BLD AUTO: 1.1 X10(3)/MCL (ref 0.1–1.3)
MONOCYTES NFR BLD AUTO: 11.9 %
NEUTROPHILS # BLD AUTO: 5.5 X10(3)/MCL (ref 2.1–9.2)
NEUTROPHILS NFR BLD AUTO: 59.7 %
NRBC BLD AUTO-RTO: 0 %
PLATELET # BLD AUTO: 267 X10(3)/MCL (ref 130–400)
PMV BLD AUTO: 10.5 FL (ref 7.4–10.4)
POTASSIUM SERPL-SCNC: 3.6 MMOL/L (ref 3.5–5.1)
PROT SERPL-MCNC: 5 GM/DL (ref 5.8–7.6)
RBC # BLD AUTO: 4.33 X10(6)/MCL (ref 4.2–5.4)
SODIUM SERPL-SCNC: 145 MMOL/L (ref 136–145)
WBC # BLD AUTO: 9.22 X10(3)/MCL (ref 4.5–11.5)

## 2025-03-06 PROCEDURE — 99900035 HC TECH TIME PER 15 MIN (STAT)

## 2025-03-06 PROCEDURE — 97110 THERAPEUTIC EXERCISES: CPT | Mod: CQ

## 2025-03-06 PROCEDURE — 80053 COMPREHEN METABOLIC PANEL: CPT | Performed by: INTERNAL MEDICINE

## 2025-03-06 PROCEDURE — 63600175 PHARM REV CODE 636 W HCPCS: Performed by: INTERNAL MEDICINE

## 2025-03-06 PROCEDURE — 25000003 PHARM REV CODE 250: Performed by: INTERNAL MEDICINE

## 2025-03-06 PROCEDURE — 25000242 PHARM REV CODE 250 ALT 637 W/ HCPCS: Performed by: INTERNAL MEDICINE

## 2025-03-06 PROCEDURE — 94760 N-INVAS EAR/PLS OXIMETRY 1: CPT

## 2025-03-06 PROCEDURE — 36415 COLL VENOUS BLD VENIPUNCTURE: CPT | Performed by: INTERNAL MEDICINE

## 2025-03-06 PROCEDURE — 97168 OT RE-EVAL EST PLAN CARE: CPT

## 2025-03-06 PROCEDURE — 27000221 HC OXYGEN, UP TO 24 HOURS

## 2025-03-06 PROCEDURE — 85025 COMPLETE CBC W/AUTO DIFF WBC: CPT | Performed by: INTERNAL MEDICINE

## 2025-03-06 PROCEDURE — 21400001 HC TELEMETRY ROOM

## 2025-03-06 PROCEDURE — 97116 GAIT TRAINING THERAPY: CPT | Mod: CQ

## 2025-03-06 RX ADMIN — OXYCODONE AND ACETAMINOPHEN 1 TABLET: 10; 325 TABLET ORAL at 06:03

## 2025-03-06 RX ADMIN — ENOXAPARIN SODIUM 40 MG: 40 INJECTION SUBCUTANEOUS at 04:03

## 2025-03-06 RX ADMIN — TIOTROPIUM BROMIDE INHALATION SPRAY 2 PUFF: 3.12 SPRAY, METERED RESPIRATORY (INHALATION) at 04:03

## 2025-03-06 RX ADMIN — GABAPENTIN 300 MG: 300 CAPSULE ORAL at 09:03

## 2025-03-06 RX ADMIN — DRONABINOL 2.5 MG: 2.5 CAPSULE ORAL at 06:03

## 2025-03-06 RX ADMIN — OXYCODONE AND ACETAMINOPHEN 1 TABLET: 10; 325 TABLET ORAL at 09:03

## 2025-03-06 RX ADMIN — SODIUM BICARBONATE 650 MG TABLET 650 MG: at 09:03

## 2025-03-06 RX ADMIN — FLUTICASONE FUROATE AND VILANTEROL TRIFENATATE 1 PUFF: 100; 25 POWDER RESPIRATORY (INHALATION) at 10:03

## 2025-03-06 RX ADMIN — CETIRIZINE HYDROCHLORIDE 10 MG: 10 TABLET, FILM COATED ORAL at 09:03

## 2025-03-06 RX ADMIN — SENNOSIDES AND DOCUSATE SODIUM 2 TABLET: 50; 8.6 TABLET ORAL at 09:03

## 2025-03-06 RX ADMIN — DRONABINOL 2.5 MG: 2.5 CAPSULE ORAL at 04:03

## 2025-03-06 NOTE — PROGRESS NOTES
[facility] Hospital Medicine Progress Note        Chief Complaint: Inpatient Follow-up    HPI:     64-year-old female with significant history of COPD/emphysema,, chronic hep C status post treatment, stage I B non-small-cell lung cancer status post lobectomy, mediastinal lymph node dissection in 2017.  Was admitted to hospital medicine services with complaints of left hip pain after a fall and was found to have left displaced femoral neck fracture.  Orthopedics consulted and she underwent left hip hemiarthroplasty on 02/22.  Therapy services consulted postoperatively.  Postoperative period Significant for fever spikes with diarrhea evaluated with stool studies which came back negative except for positive leukocytes, diarrhea later improved, other sources of sepsis also ruled out.  she also had postoperative ileus managed conservatively.  Symptomatically improved.  Patient also developed lower extremity swelling which was evaluated with venous ultrasound, came back negative for DVT    Interval Hx:   Patient seen at bedside, comfortably sitting in the couch, complaining of left thigh swelling, no redness, no abdominal symptoms, hemodynamics stable, saturations low 90s, no acute events overnight    Objective/physical exam:  General: In no acute distress, afebrile  Chest: Clear to auscultation bilaterally  Heart: S1, S2, no appreciable murmur  Abdomen: Soft, nontender, BS +  MSK:  Left thigh swollen   Neurologic: Alert and oriented x4,   VITAL SIGNS: 24 HRS MIN & MAX LAST   Temp  Min: 97.8 °F (36.6 °C)  Max: 98.5 °F (36.9 °C) 97.9 °F (36.6 °C)   BP  Min: 107/70  Max: 131/73 112/73   Pulse  Min: 78  Max: 101  98   Resp  Min: 17  Max: 18 18   SpO2  Min: 95 %  Max: 98 % 96 %       Recent Labs   Lab 03/03/25  0430   WBC 7.36   RBC 3.92*   HGB 10.6*   HCT 33.3*   MCV 84.9   MCH 27.0   MCHC 31.8*   RDW 18.1*      MPV 10.7*         Recent Labs   Lab 03/04/25  0309      K 3.7   CL  111*   CO2 22*   BUN <3.0*   CREATININE 0.54*   CALCIUM 8.0*   MG 1.80   ALBUMIN 1.8*   ALKPHOS 107   ALT 16   AST 31   BILITOT 0.3          Microbiology Results (last 7 days)       Procedure Component Value Units Date/Time    Blood Culture [[id number]]  (Normal) Collected: 02/26/25 0727    Order Status: Completed Specimen: Blood from Arm, Left Updated: 03/03/25 1200     Blood Culture No Growth at 5 days    Blood Culture [[id number]]  (Normal) Collected: 02/26/25 0731    Order Status: Completed Specimen: Blood from Hand, Right Updated: 03/03/25 1200     Blood Culture No Growth at 5 days    Stool Culture [[id number]]  (Normal) Collected: 02/26/25 1052    Order Status: Completed Specimen: Stool Updated: 02/28/25 1038     Stool Culture Negative for Salmonella, Shigella, Campylobacter, Vibrio, Aeromonas, Pleisiomonas,Yersinia, or Shiga Toxin 1 and 2.             Scheduled Med:   cetirizine  10 mg Oral Daily    droNABinol  2.5 mg Oral BID AC    enoxparin  40 mg Subcutaneous Q24H (prophylaxis, 1700)    ferrous sulfate  1 tablet Oral Daily    fluticasone furoate-vilanteroL  1 puff Inhalation Daily    gabapentin  300 mg Oral BID    polyethylene glycol  17 g Oral Daily    senna-docusate 8.6-50 mg  2 tablet Oral BID    sodium bicarbonate  650 mg Oral BID    tiotropium bromide  2 puff Inhalation Daily          Assessment/Plan:    Mechanical fall   Left displaced femoral neck fracture status post hemiarthroplasty 2/22   Left thigh swelling  Acute diarrhea postoperatively-improved   Postoperative ileus-resolved  Cachexia  COPD/emphysema with no acute exacerbation   Chronic hep C-completed treatment   History of stage I B non-small-cell lung cancer status post lobectomy in 2017, in remission  Prophylaxis    Left thigh swollen, DVT ruled out   CT to rule out hematoma  GI symptoms resolved, tolerating by mouth diet  Patient appears cachectic   Thought to be in remission from lung cancer   I will repeat CT chest to rule out  recurrence   Patient had CT abdomen/pelvis this hospitalization with no concerns for occult malignancy   On dronabinol for appetite stimulation   Continue Zyrtec, iron, bronchodilators, gabapentin, bowel regimen   DVT prophylaxis-subQ Lovenox      Trupti Wagner MD   03/06/2025

## 2025-03-06 NOTE — PT/OT/SLP RE-EVAL
Occupational Therapy   Re-evaluation    Name: Lauren Ring  MRN: [id number]  Admitting Diagnosis: s/p fall with L hemiarthroplasty  Recent Surgery: Procedure(s) (LRB):  HEMIARTHROPLASTY, HIP, POSTERIOR APPROACH (Left) 12 Days Post-Op    Recommendations:     Discharge therapy intensity: Moderate Intensity Therapy   Discharge Equipment Recommendations:  to be determined by next level of care  Barriers to discharge:       Assessment:     Lauren Ring is a 64 y.o. female with a medical diagnosis of s/p fall with L hemiarthroplasty.  She presents with the following performance deficits affecting function: impaired endurance, weakness, impaired self care skills, impaired functional mobility, decreased lower extremity function, orthopedic precautions.  Pt is progressing well with therapy, noted with some improvement in activity tolerance and general strength, ADL's and mobility continue to improve  Rehab Prognosis: Good; patient would benefit from acute skilled OT services to address these deficits and reach maximum level of function.       Plan:     Patient to be seen 6 x/week to address the above listed problems via self-care/home management, therapeutic activities, therapeutic exercises  Plan of Care Expires: 03/23/25  Plan of Care Reviewed with: patient    Subjective     Chief Complaint: none  Patient/Family Comments/goals: get stronger    Pain/Comfort:  Pain Rating 1: 0/10    Patients cultural, spiritual, Uatsdin conflicts given the current situation: no    Objective:     OT communicated with nsg and PTA prior to session.      Patient was found supine with   upon OT entry to room.    General Precautions: Standard, fall  Orthopedic Precautions: LLE weight bearing as tolerated, LLE posterior precautions  Braces: N/A    Vital Signs:     Bed Mobility:    Sup to sit SBA    Functional Mobility/Transfers:  Sit to stand from bed CGA  Functional Mobility: ambulated to BR with CGA with RW  Toilet transfer with min  assist    Activities of Daily Living:  Toilet hygiene with SBA  Socks with mod assist  Grooming seated with mod I, setup to chair    AMPAC 6 Click ADL:  AMPA Total Score:      Functional Cognition:  intact    Visual Perceptual Skills:      Upper Extremity Function:  Right Upper Extremity:   intact    Left Upper Extremity:  intact    Balance:   SBA standing with RW, min/CGA dynamic with RW      Additional Treatment:      Patient Education:  Patient provided with verbal education education regarding post op precautions, safety awareness, and Discharge/DME recommendations.  Understanding was verbalized.     Patient left up in chair with all lines intact and call button in reach    GOALS:   Multidisciplinary Problems       Occupational Therapy Goals          Problem: Occupational Therapy    Goal Priority Disciplines Outcome Interventions   Occupational Therapy Goal     OT, PT/OT Progressing    Description: LTG: Pt will perform basic ADLs and ADL transfers with Modified independence using LRAD by discharge.    STG: to be met by 3/23/25    Pt will complete grooming standing at sink with LRAD with SBA.  Pt will complete UB dressing with SBA.  Pt will complete LB dressing with SBA using LRAD and AE prn.  Pt will complete toileting with SBA using LRAD.  Pt will complete functional mobility to/from toilet and toilet transfer with SBA using LRAD.                        History:     Past Medical History:   Diagnosis Date    Anxiety     Cataract 7/26/2023    Closed nondisplaced pilon fracture of right tibia with routine healing     COPD (chronic obstructive pulmonary disease)     Depression     Draining cutaneous sinus tract 8/2/2022    Emphysema lung     History of lung cancer 10/4/2022    Lung cancer 2017    Maxillary sinusitis 12/15/2022    Osteomyelitis of right ankle 7/26/2023    Primary malignant neoplasm of lung 7/26/2023         Past Surgical History:   Procedure Laterality Date    CARPAL TUNNEL RELEASE Bilateral      CATARACT EXTRACTION Bilateral     HEMIARTHROPLASTY, HIP, POSTERIOR APPROACH Left 2/22/2025    Procedure: HEMIARTHROPLASTY, HIP, POSTERIOR APPROACH;  Surgeon: Clive Martin Jr., MD;  Location: [facility];  Service: Orthopedics;  Laterality: Left;    HYSTERECTOMY      OPEN REDUCTION AND INTERNAL FIXATION (ORIF) OF INJURY OF ANKLE Right     THORACOTOMY      TONSILLECTOMY         Time Tracking:     OT Date of Treatment: 03/06/25  OT Start Time: 0814  OT Stop Time: 0837  OT Total Time (min): 23 min    Billable Minutes:Re-eval 1 unit    3/6/2025

## 2025-03-06 NOTE — PT/OT/SLP PROGRESS
Physical Therapy Treatment    Patient Name:  Lauren Ring   MRN:  [id number]    Recommendations:     Discharge therapy intensity: Moderate Intensity Therapy   Discharge Equipment Recommendations: to be determined by next level of care  Barriers to discharge: Inaccessible home, Decreased caregiver support, and Impaired mobility    Assessment:     Lauren Ring is a 64 y.o. female admitted with a medical diagnosis of .  She presents with the following impairments/functional limitations: weakness, impaired endurance, impaired self care skills, impaired functional mobility, gait instability, impaired balance, decreased lower extremity function, decreased safety awareness, pain L displaced femoral neck fx following a fall 2 weeks ago; now s/p L hip hemiarthroplasty on 2/22. .      Rehab Prognosis: Good; patient would benefit from acute skilled PT services to address these deficits and reach maximum level of function.    Recent Surgery: Procedure(s) (LRB):  HEMIARTHROPLASTY, HIP, POSTERIOR APPROACH (Left) 12 Days Post-Op    Plan:     During this hospitalization, patient would benefit from acute PT services 6 x/week to address the identified rehab impairments via gait training, therapeutic activities, therapeutic exercises, neuromuscular re-education and progress toward the following goals:    Plan of Care Expires:  03/23/25    Subjective     Chief Complaint:   Patient/Family Comments/goals:   Pain/Comfort:  Pain Rating 1: 0/10      Objective:     Communicated with NSG prior to session.  Patient found HOB elevated with oxygen, telemetry upon PT entry to room.     General Precautions: Standard, fall  Orthopedic Precautions: LLE weight bearing as tolerated, LLE posterior precautions  Braces: N/A  Respiratory Status: Nasal cannula, flow 2 L/min  Blood Pressure:   Skin Integrity: Visible skin intact      Functional Mobility:  Bed Mobility:     Scooting: stand by assistance  Supine to Sit: stand by assistance  Transfers:     Sit  to Stand:  minimum assistance with rolling walker and 1 trial from EOB,1 trial from toilet, 1 trial from bedside chair    Toilet Transfer: minimum assistance with  rolling walker  using  Step Transfer and    Gait: pt amb 10ft/18ft with RW Jose. Pt presents with short step-through gait pattern. Seated rest between trials . Pt required increased time today 2/2 fatigue     BLE AROM: ankle pumps, knee flex/ext. Hip abd, hip flex/ext. All pxns maintained. 12 reps      Patient left up in chair with all lines intact and call button in reach    GOALS:   Multidisciplinary Problems       Physical Therapy Goals          Problem: Physical Therapy    Goal Priority Disciplines Outcome Interventions   Physical Therapy Goal     PT, PT/OT Progressing    Description: Goals to be met by: 3/23/25     Patient will increase functional independence with mobility by performin. Supine to sit with Set-up Moran  2. Sit to supine with Set-up Moran  3. Sit to stand transfer with Supervision  4. Bed to chair transfer with Supervision using Rolling Walker  5. Gait  x 200 feet with Supervision using Rolling Walker.                            Time Tracking:     PT Received On: 25  PT Start Time: 814     PT Stop Time: 837  PT Total Time (min): 23 min     Billable Minutes: Gait Training 15 and Therapeutic Exercise 8    Treatment Type: Treatment  PT/PTA: PTA     Number of PTA visits since last PT visit: 2     2025

## 2025-03-06 NOTE — PLAN OF CARE
Problem: Adult Inpatient Plan of Care  Goal: Plan of Care Review  3/6/2025 1333 by Natalee Whalen RN  Outcome: Progressing  3/6/2025 1332 by Natalee Whalen RN  Outcome: Progressing     Problem: Adult Inpatient Plan of Care  Goal: Patient-Specific Goal (Individualized)  3/6/2025 1333 by Natalee Whalen RN  Outcome: Progressing  3/6/2025 1332 by Natalee Whalen RN  Outcome: Progressing     Problem: Adult Inpatient Plan of Care  Goal: Absence of Hospital-Acquired Illness or Injury  3/6/2025 1333 by Natalee Whalen RN  Outcome: Progressing  3/6/2025 1332 by Natalee Whalen RN  Outcome: Progressing     Problem: Adult Inpatient Plan of Care  Goal: Optimal Comfort and Wellbeing  3/6/2025 1333 by Natalee Whalen RN  Outcome: Progressing  3/6/2025 1332 by Natalee Whalen RN  Outcome: Progressing     Problem: Adult Inpatient Plan of Care  Goal: Readiness for Transition of Care  3/6/2025 1333 by Natalee Whalen RN  Outcome: Progressing  3/6/2025 1332 by Natalee Whalen RN  Outcome: Progressing     Problem: Fall Injury Risk  Goal: Absence of Fall and Fall-Related Injury  3/6/2025 1333 by Natalee Whalen RN  Outcome: Progressing  3/6/2025 1332 by Natalee hWalen RN  Outcome: Progressing     Problem: Pain Acute  Goal: Optimal Pain Control and Function  3/6/2025 1333 by Natalee Whalen RN  Outcome: Progressing  3/6/2025 1332 by Natalee Whalen RN  Outcome: Progressing     Problem: Skin Injury Risk Increased  Goal: Skin Health and Integrity  3/6/2025 1333 by Natalee Whalen RN  Outcome: Progressing  3/6/2025 1332 by Natalee Whalen RN  Outcome: Progressing     Problem: Infection  Goal: Absence of Infection Signs and Symptoms  3/6/2025 1333 by Natalee Whalen RN  Outcome: Progressing  3/6/2025 1332 by Natalee Whalen RN  Outcome: Progressing     Problem: Wound  Goal: Optimal Wound Healing  3/6/2025 1333 by Natalee Whalen, RN  Outcome: Progressing  3/6/2025 1332 by Natalee Whalen, RN  Outcome: Progressing      Problem: Wound  Goal: Skin Health and Integrity  3/6/2025 1333 by Natalee Whalen RN  Outcome: Progressing  3/6/2025 1332 by Natalee Whalen RN  Outcome: Progressing     Problem: Wound  Goal: Optimal Pain Control and Function  3/6/2025 1333 by Natalee Whalen RN  Outcome: Progressing  3/6/2025 1332 by Natalee Whalen RN  Outcome: Progressing     Problem: Wound  Goal: Improved Oral Intake  3/6/2025 1333 by Natalee Whalen RN  Outcome: Progressing  3/6/2025 1332 by Natalee Whalen RN  Outcome: Progressing

## 2025-03-06 NOTE — PLAN OF CARE
Problem: Adult Inpatient Plan of Care  Goal: Plan of Care Review  Outcome: Progressing  Goal: Patient-Specific Goal (Individualized)  Outcome: Progressing  Goal: Absence of Hospital-Acquired Illness or Injury  Outcome: Progressing  Goal: Optimal Comfort and Wellbeing  Outcome: Progressing  Goal: Readiness for Transition of Care  Outcome: Progressing     Problem: Fall Injury Risk  Goal: Absence of Fall and Fall-Related Injury  Outcome: Progressing     Problem: Pain Acute  Goal: Optimal Pain Control and Function  Outcome: Progressing     Problem: Wound  Goal: Optimal Coping  Outcome: Progressing  Goal: Optimal Functional Ability  Outcome: Progressing  Goal: Absence of Infection Signs and Symptoms  Outcome: Progressing  Goal: Improved Oral Intake  Outcome: Progressing  Goal: Optimal Pain Control and Function  Outcome: Progressing  Goal: Skin Health and Integrity  Outcome: Progressing  Goal: Optimal Wound Healing  Outcome: Progressing     Problem: Infection  Goal: Absence of Infection Signs and Symptoms  Outcome: Progressing     Problem: Skin Injury Risk Increased  Goal: Skin Health and Integrity  Outcome: Progressing

## 2025-03-07 VITALS
BODY MASS INDEX: 18.49 KG/M2 | TEMPERATURE: 98 F | HEIGHT: 66 IN | SYSTOLIC BLOOD PRESSURE: 106 MMHG | HEART RATE: 97 BPM | RESPIRATION RATE: 18 BRPM | DIASTOLIC BLOOD PRESSURE: 66 MMHG | WEIGHT: 115.06 LBS | OXYGEN SATURATION: 97 %

## 2025-03-07 LAB
HCT VFR BLD AUTO: 33.2 % (ref 37–47)
HGB BLD-MCNC: 10.7 G/DL (ref 12–16)

## 2025-03-07 PROCEDURE — 25000003 PHARM REV CODE 250: Performed by: INTERNAL MEDICINE

## 2025-03-07 PROCEDURE — 85014 HEMATOCRIT: CPT | Performed by: INTERNAL MEDICINE

## 2025-03-07 PROCEDURE — 97535 SELF CARE MNGMENT TRAINING: CPT | Mod: CO

## 2025-03-07 PROCEDURE — 63600175 PHARM REV CODE 636 W HCPCS: Performed by: INTERNAL MEDICINE

## 2025-03-07 PROCEDURE — 25000242 PHARM REV CODE 250 ALT 637 W/ HCPCS: Performed by: INTERNAL MEDICINE

## 2025-03-07 PROCEDURE — 36415 COLL VENOUS BLD VENIPUNCTURE: CPT | Performed by: INTERNAL MEDICINE

## 2025-03-07 PROCEDURE — P9047 ALBUMIN (HUMAN), 25%, 50ML: HCPCS | Performed by: INTERNAL MEDICINE

## 2025-03-07 RX ORDER — FUROSEMIDE 10 MG/ML
20 INJECTION INTRAMUSCULAR; INTRAVENOUS ONCE
Status: COMPLETED | OUTPATIENT
Start: 2025-03-07 | End: 2025-03-07

## 2025-03-07 RX ORDER — OXYCODONE AND ACETAMINOPHEN 10; 325 MG/1; MG/1
1 TABLET ORAL EVERY 8 HOURS PRN
Qty: 15 TABLET | Refills: 0 | Status: SHIPPED | OUTPATIENT
Start: 2025-03-07 | End: 2025-03-12

## 2025-03-07 RX ORDER — ENOXAPARIN SODIUM 100 MG/ML
40 INJECTION SUBCUTANEOUS EVERY 24 HOURS
Qty: 12 ML | Refills: 0 | Status: SHIPPED | OUTPATIENT
Start: 2025-03-07 | End: 2025-04-06

## 2025-03-07 RX ORDER — GABAPENTIN 300 MG/1
300 CAPSULE ORAL 2 TIMES DAILY
Qty: 60 CAPSULE | Refills: 0 | Status: SHIPPED | OUTPATIENT
Start: 2025-03-07 | End: 2025-04-06

## 2025-03-07 RX ORDER — ALPRAZOLAM 0.5 MG/1
0.5 TABLET ORAL 2 TIMES DAILY PRN
Qty: 6 TABLET | Refills: 0 | Status: SHIPPED | OUTPATIENT
Start: 2025-03-07 | End: 2025-03-10

## 2025-03-07 RX ORDER — DRONABINOL 2.5 MG/1
2.5 CAPSULE ORAL
Qty: 30 CAPSULE | Refills: 0 | Status: SHIPPED | OUTPATIENT
Start: 2025-03-07 | End: 2025-03-22

## 2025-03-07 RX ORDER — FUROSEMIDE 20 MG/1
TABLET ORAL
Qty: 30 TABLET | Refills: 0 | Status: SHIPPED | OUTPATIENT
Start: 2025-03-07

## 2025-03-07 RX ORDER — ALBUMIN HUMAN 250 G/1000ML
12.5 SOLUTION INTRAVENOUS ONCE
Status: COMPLETED | OUTPATIENT
Start: 2025-03-07 | End: 2025-03-07

## 2025-03-07 RX ORDER — POLYETHYLENE GLYCOL 3350 17 G/17G
17 POWDER, FOR SOLUTION ORAL DAILY
Qty: 30 PACKET | Refills: 0 | Status: SHIPPED | OUTPATIENT
Start: 2025-03-08

## 2025-03-07 RX ADMIN — FLUTICASONE FUROATE AND VILANTEROL TRIFENATATE 1 PUFF: 100; 25 POWDER RESPIRATORY (INHALATION) at 09:03

## 2025-03-07 RX ADMIN — GABAPENTIN 300 MG: 300 CAPSULE ORAL at 09:03

## 2025-03-07 RX ADMIN — FUROSEMIDE 20 MG: 10 INJECTION, SOLUTION INTRAMUSCULAR; INTRAVENOUS at 09:03

## 2025-03-07 RX ADMIN — OXYCODONE AND ACETAMINOPHEN 1 TABLET: 10; 325 TABLET ORAL at 05:03

## 2025-03-07 RX ADMIN — CETIRIZINE HYDROCHLORIDE 10 MG: 10 TABLET, FILM COATED ORAL at 09:03

## 2025-03-07 RX ADMIN — DRONABINOL 2.5 MG: 2.5 CAPSULE ORAL at 05:03

## 2025-03-07 RX ADMIN — ALBUMIN (HUMAN) 12.5 G: 12.5 SOLUTION INTRAVENOUS at 09:03

## 2025-03-07 RX ADMIN — FERROUS SULFATE TAB 325 MG (65 MG ELEMENTAL FE) 1 EACH: 325 (65 FE) TAB at 09:03

## 2025-03-07 NOTE — DISCHARGE SUMMARY
[facility]  Hospital Medicine Discharge Summary    Admit Date: 2/21/2025  Discharge Date and Time: 3/7/252974:08 AM  Admitting Physician: PAM Team  Discharging Physician: Trupti Wagner MD.  Primary Care Physician: Jose Juan Dunn MD      Discharge Diagnoses:    Mechanical fall   Left displaced femoral neck fracture status post hemiarthroplasty 2/22   Left thigh swelling  Acute diarrhea postoperatively-improved   Postoperative ileus-resolved  Cachexia  COPD/emphysema with no acute exacerbation   Chronic hep C-completed treatment   History of stage I B non-small-cell lung cancer status post lobectomy in 2017, in remission    Hospital Course:     64-year-old female with significant history of COPD/emphysema,, chronic hep C status post treatment, stage I B non-small-cell lung cancer status post lobectomy, mediastinal lymph node dissection in 2017.  Was admitted to hospital medicine services with complaints of left hip pain after a fall and was found to have left displaced femoral neck fracture.  Orthopedics consulted and she underwent left hip hemiarthroplasty on 02/22.  Therapy services consulted postoperatively.  Postoperative period Significant for fever spikes with diarrhea evaluated with stool studies which came back negative except for positive leukocytes, diarrhea later improved, other sources of sepsis also ruled out.  she also had postoperative ileus managed conservatively.  Symptomatically improved.  Patient also developed lower extremity swelling which was evaluated with venous ultrasound, came back negative for DVT .  CT was ordered to rule out hematoma, came back concerning for old blood, but hemoglobin remaining stable, no worsening swelling, GI symptoms resolved, tolerating by mouth diet without any difficulty, since she appeared cachectic and given previous history of lung cancer I decided to obtain a CT chest to rule out recurrence-negative for any recurrent malignancy,  patient had CT abdomen/pelvis during this hospitalization with no concerns for occult malignancy, dronabinol continued for appetite stimulation, home medications continued as appropriate.  Patient was re-evaluated on 03/07, CT chest concerning for anasarca, patient has hypoalbuminemia and possible 3rd spacing, she was cleaned with a dose of Lasix and albumin, she was accepted to skilled nursing facility on 03/07, decided to discharge after albumin/Lasix on p.o. Lasix, volume status/renal function can be closely monitored at nursing home and Lasix dosing can be adjusted further there, discharge medications per med rec, follow up with PCP, Orthopedics,.  Treatment plans discussed with the patient and she voiced understanding to everything explained  Vitals:  VITAL SIGNS: 24 HRS MIN & MAX LAST   Temp  Min: 98.1 °F (36.7 °C)  Max: 98.6 °F (37 °C) 98.4 °F (36.9 °C)   BP  Min: 103/63  Max: 133/74 106/66   Pulse  Min: 85  Max: 109  97   Resp  Min: 15  Max: 20 18   SpO2  Min: 93 %  Max: 98 % 97 %       Physical Exam:  General appearance:  No acute distress  HENT: Atraumatic   Lungs: Clear to auscultation bilaterally.   Heart: RRR,No edema  Abdomen: Soft, non tender   Extremities: warm  Neuro:  Awake, alert, oriented x4  Psych/mental status: Appropriate mood and affect.      Procedures Performed: No admission procedures for hospital encounter.     Significant Diagnostic Studies: See Full reports for all details    Recent Labs   Lab 03/02/25  0509 03/03/25  0430 03/06/25  0815 03/07/25  0855   WBC 7.54 7.36 9.22  --    RBC 3.87* 3.92* 4.33  --    HGB 10.4* 10.6* 11.6* 10.7*   HCT 32.5* 33.3* 37.0 33.2*   MCV 84.0 84.9 85.5  --    MCH 26.9* 27.0 26.8*  --    MCHC 32.0* 31.8* 31.4*  --    RDW 17.2* 18.1* 18.5*  --     246 267  --    MPV 10.1 10.7* 10.5*  --        Recent Labs   Lab 03/02/25  0417 03/03/25  0430 03/04/25  0309 03/06/25  0815    141 145 145   K 3.0* 2.8* 3.7 3.6   * 113* 111* 106   CO2 21* 22*  22* 25   BUN <3.0* <3.0* <3.0* 3.3*   CREATININE 0.54* 0.54* 0.54* 0.58   CALCIUM 7.6* 7.4* 8.0* 8.1*   MG 1.70 1.90 1.80  --    ALBUMIN 1.9* 1.8* 1.8* 1.9*   ALKPHOS 68 80 107 133   ALT 12 14 16 10   AST 21 29 31 25   BILITOT 0.5 0.3 0.3 0.2        Microbiology Results (last 7 days)       Procedure Component Value Units Date/Time    Blood Culture [[id number]]  (Normal) Collected: 02/26/25 0727    Order Status: Completed Specimen: Blood from Arm, Left Updated: 03/03/25 1200     Blood Culture No Growth at 5 days    Blood Culture [[id number]]  (Normal) Collected: 02/26/25 0731    Order Status: Completed Specimen: Blood from Hand, Right Updated: 03/03/25 1200     Blood Culture No Growth at 5 days             CT Thigh Without Contrast Left  Narrative: EXAMINATION:  CT THIGH WITHOUT CONTRAST LEFT    CLINICAL HISTORY:  Swelling, recent fracture, rule out hematoma;    TECHNIQUE:  Helically acquired imaging through the left thigh were obtained without the IV administration of contrast. Axial, sagittal and coronal reformations were created and interpreted.    Automated tube current modulation, weight-based exposure dosing, and/or iterative reconstruction technique utilized to reach lowest reasonably achievable exposure rate.    DLP: 1121 mGy*cm    COMPARISON:  Pelvis radiograph 02/21/2025, 02/22/2025    FINDINGS:  BONES/JOINTS: There are postsurgical changes of left hip hemiarthroplasty.  Hardware appears engaged and intact. Alignment satisfactory. Beam hardening artifact obscures evaluation of the femoral greater trochanter.  Suspected nondisplaced fracture at the greater trochanter.  There is a nondisplaced fracture at the inferior-most sacral element.  The bones are demineralized.    SOFT TISSUES: Diffuse edema at the hip and leg.  There is a gluteal collection on the left measuring approximately 9 x 4 x 13 cm (AP x TRV x CC).  Internal attenuation as measured superiorly 2.5 Hounsfield units.  Inferiorly the collection is  obscured by beam hardening artifact which precludes accurate measurement of internal attenuation.    OTHER: N/A  Impression: Posterolateral gluteal fluid collection may be related to postoperative seroma or old hematoma with hypodense lysed blood products.  If there is concern for acute hemorrhage, correlate with hemoglobin hematocrit levels.    Electronically signed by: Rani Ng  Date:    03/07/2025  Time:    08:19  CT Chest Without Contrast  Narrative: EXAMINATION:  CT CHEST WITHOUT CONTRAST    CLINICAL HISTORY:  History of lung cancer, rule out recurrence given weight loss/cachexia;    TECHNIQUE:  Helically acquired axial images, sagittal and coronal reformations were obtained from the thoracic inlet to the lung bases without the IV administration of contrast.    Automated tube current modulation, weight-based exposure dosing, and/or iterative reconstruction technique utilized to reach lowest reasonably achievable exposure rate.    DLP: 359 mGy*cm    COMPARISON:  Chest radiograph 02/26/2025, CT chest 08/17/2020    FINDINGS:  BASE OF NECK: No significant abnormality.    AORTA: Aortic atherosclerosis.    HEART: Normal size. No effusion. There are coronary artery calcifications.    FRANCISCA/MEDIASTINUM: No enlarged lymph nodes by size criteria.  Evaluation of hilar lymphadenopathy is limited without intravenous contrast.    AIRWAYS: Patent.    LUNGS: Severe pulmonary emphysema.  Septal thickening at the lung bases.  Postop right upper lobectomy.    PLEURA: Small dependently layering pleural effusions.    UPPER ABDOMEN: No abnormality of the partially imaged upper abdomen.    THORACIC SOFT TISSUES: Body wall edema.  Bilateral breast implants.    BONES: No acute fracture. No suspicious lytic or sclerotic lesions.  Probable healed fracture of the left 4th rib laterally.  Impression: 1. Anasarca  2. Emphysema    Electronically signed by: Rani Ng  Date:    03/07/2025  Time:    08:08  CV Ultrasound doppler  venous DVT leg left  Negative for deep and superficial vein thrombosis in the left lower   extremity.         Medication List        START taking these medications      droNABinol 2.5 MG capsule  Commonly known as: MARINOL  Take 1 capsule (2.5 mg total) by mouth 2 (two) times daily before meals. for 15 days     enoxaparin 40 mg/0.4 mL Syrg  Commonly known as: LOVENOX  Inject 0.4 mLs (40 mg total) into the skin Q24H (prophylaxis, 1700).     furosemide 20 MG tablet  Commonly known as: LASIX  20 mg daily x5 days followed by 20 mg daily p.r.n. for swelling, weight gain more than 2 lb overnight     gabapentin 300 MG capsule  Commonly known as: NEURONTIN  Take 1 capsule (300 mg total) by mouth 2 (two) times daily.     oxyCODONE-acetaminophen  mg per tablet  Commonly known as: PERCOCET  Take 1 tablet by mouth every 8 (eight) hours as needed for Pain.     polyethylene glycol 17 gram Pwpk  Commonly known as: GLYCOLAX  Take 17 g by mouth once daily.  Start taking on: March 8, 2025            CHANGE how you take these medications      ALPRAZolam 0.5 MG tablet  Commonly known as: XANAX  Take 1 tablet (0.5 mg total) by mouth 2 (two) times daily as needed for Anxiety.  What changed:   how much to take  how to take this  when to take this  reasons to take this  additional instructions            CONTINUE taking these medications      albuterol 90 mcg/actuation inhaler  Commonly known as: PROAIR HFA  Inhale 2 puffs into the lungs every 6 (six) hours as needed for Wheezing.     albuterol-ipratropium 2.5 mg-0.5 mg/3 mL nebulizer solution  Commonly known as: DUO-NEB  Take 3 mLs by nebulization 3 (three) times daily as needed for Wheezing.     aspirin 81 MG EC tablet  Commonly known as: ECOTRIN     famotidine 40 MG tablet  Commonly known as: PEPCID  Take 1 tablet (40 mg total) by mouth once daily.     ferrous sulfate Tab tablet  Commonly known as: FEOSOL     loratadine 10 mg tablet  Commonly known as: CLARITIN     SPIRIVA  RESPIMAT 2.5 mcg/actuation inhaler  Generic drug: tiotropium bromide     SYMBICORT 160-4.5 mcg/actuation Hfaa  Generic drug: budesonide-formoterol 160-4.5 mcg  Inhale 2 puffs into the lungs every 12 (twelve) hours.     TRINTELLIX 5 mg Tab  Generic drug: vortioxetine  Take 1 tablet (5 mg total) by mouth once daily.            STOP taking these medications      azithromycin 250 MG tablet  Commonly known as: Z-GIRISH     oxybutynin 5 MG Tab  Commonly known as: DITROPAN               Where to Get Your Medications        These medications were sent to [facility] - [place], [place] - [address] [address] [address] [address], [facility] [address]      Phone: [phone]   ALPRAZolam 0.5 MG tablet  droNABinol 2.5 MG capsule  enoxaparin 40 mg/0.4 mL Syrg  furosemide 20 MG tablet  gabapentin 300 MG capsule  oxyCODONE-acetaminophen  mg per tablet  polyethylene glycol 17 gram Pwpk          Explained in detail to the patient about the discharge plan, medications, and follow-up visits. Pt understands and agrees with the treatment plan  Discharge Disposition: Home or Self Care   Discharged Condition: stable  Diet-   Dietary Orders (From admission, onward)       Start     Ordered    03/03/25 1418  Dietary nutrition supplements TID; Boost Glucose Control - Any flavor  Continuous        Question Answer Comment   Frequency: TID    Select PO Supplement: Boost Glucose Control - Any flavor        03/03/25 1417    02/28/25 1233  Diet Adult Regular Standard Tray  Diet effective now        Question:  Tray type:  Answer:  Standard Tray    02/28/25 1232                   Medications Per DC med rec  Activities as tolerated   Follow-up Information       Kailee Valera, MARIEP. Go on 3/28/2025.    Specialty: Orthopedic Surgery  Why: 03/28/2025 at 8:00 am  Contact information:  [address]  [facility] [id number] [phone]               Jose Juan Dunn MD Follow up in 1 week(s).    Specialty: Family Medicine  Contact  information:  [address]  [address]  [place] [address]  [phone]                           For further questions contact hospitalist office    Discharge time 33 minutes    For worsening symptoms, chest pain, shortness of breath, increased abdominal pain, high grade fever, stroke or stroke like symptoms, immediately go to the nearest Emergency Room or call 911 as soon as possible.      Trutpi Estrella M.D, on 3/7/2025. at 11:08 AM.

## 2025-03-07 NOTE — PLAN OF CARE
03/07/25 1258   Final Note   Assessment Type Final Discharge Note   Anticipated Discharge Disposition SNF   Post-Acute Status   Post-Acute Authorization Placement   Post-Acute Placement Status Set-up Complete/Auth obtained  ([facility])     Notified patient's daughter regarding DC plan.

## 2025-03-07 NOTE — PT/OT/SLP PROGRESS
Occupational Therapy   Treatment    Name: Lauren Ring  MRN: [id number]    Recommendations:     Recommended therapy intensity at discharge: Moderate Intensity Therapy   Discharge Equipment Recommendations:  to be determined by next level of care  Barriers to discharge:       Assessment:     Lauren Ring is a 64 y.o. female with a medical diagnosis of  s/p fall with L hemiarthroplasty. Performance deficits affecting function are impaired endurance, weakness, impaired self care skills, impaired functional mobility, decreased lower extremity function, orthopedic precautions.     Rehab Prognosis:  Good; patient would benefit from acute skilled OT services to address these deficits and reach maximum level of function.       Plan:     Patient to be seen 6 x/week to address the above listed problems via self-care/home management, therapeutic activities, therapeutic exercises  Plan of Care Expires: 03/23/25  Plan of Care Reviewed with: patient    Subjective     Pain/Comfort:  Pain Rating 1: 0/10    Objective:     Communicated with: RN prior to session.  Patient found HOB elevated with oxygen, telemetry, pulse ox (continuous) upon OT entry to room.    General Precautions: Standard, fall    Orthopedic Precautions:LLE weight bearing as tolerated, LLE posterior precautions  Braces: N/A  Respiratory Status: Nasal cannula, flow 2 L/min     Occupational Performance:     Bed Mobility:    Patient completed Rolling/Turning to Right with stand by assistance  Patient completed Scooting/Bridging with stand by assistance     Functional Mobility/Transfers:  Patient completed Sit <> Stand Transfer with minimum assistance  with  rolling walker   Patient completed Toilet Transfer Step Transfer technique with minimum assistance with  rolling walker  Functional Mobility: ambulated to bathroom with Min-CGA and RW. No LOB.    Activities of Daily Living:  Toileting: SBA for seated pericare after +void.     Therapeutic Positioning    OT  interventions performed during the course of today's session in an effort to prevent and/or reduce acquired pressure injuries:   Education was provided on benefits of and recommendations for therapeutic positioning    [facility] 6 Click ADL:      Patient Education:  Patient provided with verbal education education regarding OT role/goals/POC, fall prevention, and safety awareness.  Understanding was verbalized.      Patient left up in chair with all lines intact and call button in reach.    GOALS:   Multidisciplinary Problems       Occupational Therapy Goals          Problem: Occupational Therapy    Goal Priority Disciplines Outcome Interventions   Occupational Therapy Goal     OT, PT/OT Progressing    Description: LTG: Pt will perform basic ADLs and ADL transfers with Modified independence using LRAD by discharge.    STG: to be met by 3/23/25    Pt will complete grooming standing at sink with LRAD with SBA.  Pt will complete UB dressing with SBA.  Pt will complete LB dressing with SBA using LRAD and AE prn.  Pt will complete toileting with SBA using LRAD.  Pt will complete functional mobility to/from toilet and toilet transfer with SBA using LRAD.                        Time Tracking:     OT Date of Treatment: 03/07/25  OT Start Time: 0844  OT Stop Time: 0907  OT Total Time (min): 23 min    Billable Minutes:Self Care/Home Management 23    OT/KIMBERLEY: KIMBERLEY     Number of KIMBERLEY visits since last OT visit: 1    3/7/2025

## 2025-03-07 NOTE — PLAN OF CARE
Problem: Adult Inpatient Plan of Care  Goal: Plan of Care Review  Outcome: Not Progressing  Goal: Patient-Specific Goal (Individualized)  Outcome: Not Progressing  Goal: Absence of Hospital-Acquired Illness or Injury  Outcome: Not Progressing  Goal: Optimal Comfort and Wellbeing  Outcome: Not Progressing  Goal: Readiness for Transition of Care  Outcome: Not Progressing     Problem: Fall Injury Risk  Goal: Absence of Fall and Fall-Related Injury  Outcome: Not Progressing     Problem: Pain Acute  Goal: Optimal Pain Control and Function  Outcome: Not Progressing     Problem: Wound  Goal: Optimal Coping  Outcome: Not Progressing  Goal: Optimal Functional Ability  Outcome: Not Progressing  Goal: Absence of Infection Signs and Symptoms  Outcome: Not Progressing  Goal: Improved Oral Intake  Outcome: Not Progressing  Goal: Optimal Pain Control and Function  Outcome: Not Progressing  Goal: Skin Health and Integrity  Outcome: Not Progressing  Goal: Optimal Wound Healing  Outcome: Not Progressing     Problem: Infection  Goal: Absence of Infection Signs and Symptoms  Outcome: Not Progressing     Problem: Skin Injury Risk Increased  Goal: Skin Health and Integrity  Outcome: Not Progressing

## 2025-03-28 ENCOUNTER — OFFICE VISIT (OUTPATIENT)
Dept: ORTHOPEDICS | Facility: CLINIC | Age: 65
End: 2025-03-28
Payer: MEDICARE

## 2025-03-28 ENCOUNTER — HOSPITAL ENCOUNTER (OUTPATIENT)
Dept: RADIOLOGY | Facility: CLINIC | Age: 65
Discharge: HOME | End: 2025-03-28
Attending: ORTHOPAEDIC SURGERY
Payer: MEDICARE

## 2025-03-28 VITALS — DIASTOLIC BLOOD PRESSURE: 74 MMHG | HEART RATE: 91 BPM | SYSTOLIC BLOOD PRESSURE: 127 MMHG

## 2025-03-28 DIAGNOSIS — S72.002D CLOSED FRACTURE OF NECK OF LEFT FEMUR WITH ROUTINE HEALING, SUBSEQUENT ENCOUNTER: ICD-10-CM

## 2025-03-28 DIAGNOSIS — S72.002D CLOSED FRACTURE OF NECK OF LEFT FEMUR WITH ROUTINE HEALING, SUBSEQUENT ENCOUNTER: Primary | ICD-10-CM

## 2025-03-28 PROCEDURE — 73502 X-RAY EXAM HIP UNI 2-3 VIEWS: CPT | Mod: LT,,, | Performed by: ORTHOPAEDIC SURGERY

## 2025-03-28 NOTE — PROGRESS NOTES
Chief Complaint:   Chief Complaint   Patient presents with    Left Hip - Post-op Evaluation     3wks Post-Op Lt Hip Jos Sx 2/22/25-GL 5/23/25 patient states her hip is feeling ok and she can walk with her scooter she is in a wheelchair on today. She has PT there 5xs a week and she does some on her own. The senior home ask to D/C the Lovenox.        History of present illness:  02/22/2025:  Left hip hemiarthroplasty    She returns today.  She is in the nursing home.  Denies much pain. Ambulating with scooter/walker    Musculoskeletal:   Incisions healed. Without erythema, drainage, or signs of infection. Distally neurovascular intact.       Imaging: X-rays ordered and images interpreted today personally by me of 3 views of the left hip show appropriate implant position       Assessment: Closed fracture of neck of left femur with routine healing, subsequent encounter  -     X-Ray Hip 2 or 3 views Left with Pelvis when performed; Future; Expected date: 03/28/2025        Plan:  Doing well s/p above. WBAT. Continue PT - maintain posterior hip precautions for 3 months. She can discontinue the Lovenox and start Aspirin 81mg BID for the next month. Ok for her to return home when safe from mobility standpoint. Follow up here in 6 weeks with repeat xrays of the left hip.

## 2025-04-15 DIAGNOSIS — J44.1 COPD EXACERBATION: Primary | ICD-10-CM

## 2025-04-15 DIAGNOSIS — F41.8 ANXIETY WITH DEPRESSION: Chronic | ICD-10-CM

## 2025-04-15 DIAGNOSIS — J43.9 PULMONARY EMPHYSEMA, UNSPECIFIED EMPHYSEMA TYPE: Chronic | ICD-10-CM

## 2025-04-15 DIAGNOSIS — S72.002A CLOSED FRACTURE OF NECK OF LEFT FEMUR, INITIAL ENCOUNTER: ICD-10-CM

## 2025-04-15 NOTE — TELEPHONE ENCOUNTER
----- Message from Karla sent at 4/15/2025 11:37 AM CDT -----  Regarding: med refill  .Who Called: Lauren RingRefill or New Rx:RefillRX Name and Strength:SPIRIVA RESPIMAT 2.5 mcg/actuation inhalerHow is the patient currently taking it? (ex. 1XDay):Is this a 30 day or 90 day RX:RX Name and Strength:SYMBICORT 160-4.5 mcg/actuation HFAAHow is the patient currently taking it? (ex. 1XDay):Is this a 30 day or 90 day RX:Local or Mail Order:localList of preferred pharmacies on file (remove unneeded): [id number] [address] [place] [address] AVE AT [facility] Phone: [phone]: [phone] Provider:ShaunPreferred Method of Contact: Phone CallPatient's Preferred Phone Number on File: [phone] Best Call Back Number, if different:Additional Information: pt needs authorization from  to refill prescription

## 2025-04-16 RX ORDER — ALPRAZOLAM 0.5 MG/1
1 TABLET ORAL NIGHTLY PRN
Qty: 60 TABLET | Refills: 5 | Status: SHIPPED | OUTPATIENT
Start: 2025-04-16 | End: 2025-10-15

## 2025-04-16 RX ORDER — BUDESONIDE AND FORMOTEROL FUMARATE DIHYDRATE 160; 4.5 UG/1; UG/1
2 AEROSOL RESPIRATORY (INHALATION) EVERY 12 HOURS
Qty: 10.2 G | Refills: 11 | Status: SHIPPED | OUTPATIENT
Start: 2025-04-16 | End: 2026-04-16

## 2025-04-16 RX ORDER — TIOTROPIUM BROMIDE INHALATION SPRAY 3.12 UG/1
2 SPRAY, METERED RESPIRATORY (INHALATION) DAILY
Qty: 4 G | Refills: 5 | Status: SHIPPED | OUTPATIENT
Start: 2025-04-16

## 2025-04-16 RX ORDER — VORTIOXETINE 5 MG/1
5 TABLET, FILM COATED ORAL DAILY
Qty: 90 TABLET | Refills: 3 | Status: SHIPPED | OUTPATIENT
Start: 2025-04-16 | End: 2026-04-16

## 2025-05-06 DIAGNOSIS — S72.002D CLOSED FRACTURE OF NECK OF LEFT FEMUR WITH ROUTINE HEALING, SUBSEQUENT ENCOUNTER: Primary | ICD-10-CM

## 2025-05-06 RX ORDER — OXYCODONE AND ACETAMINOPHEN 5; 325 MG/1; MG/1
1 TABLET ORAL EVERY 8 HOURS PRN
Qty: 20 EACH | Refills: 0 | Status: SHIPPED | OUTPATIENT
Start: 2025-05-06 | End: 2025-05-13

## 2025-05-06 RX ORDER — OXYCODONE AND ACETAMINOPHEN 5; 325 MG/1; MG/1
1 TABLET ORAL EVERY 4 HOURS PRN
COMMUNITY
End: 2025-05-06 | Stop reason: SDUPTHER

## 2025-05-27 ENCOUNTER — PATIENT OUTREACH (OUTPATIENT)
Facility: CLINIC | Age: 65
End: 2025-05-27
Payer: MEDICARE

## 2025-05-27 NOTE — PROGRESS NOTES
Value base Outreach    Refusing Health Maintenance Topics  Mammogram and Colon screenings, due to COPD  Last Medicare Wellness 3/2/2025

## 2025-05-29 ENCOUNTER — TELEPHONE (OUTPATIENT)
Dept: PRIMARY CARE CLINIC | Facility: CLINIC | Age: 65
End: 2025-05-29
Payer: MEDICARE

## 2025-05-29 NOTE — TELEPHONE ENCOUNTER
Copied from CRM #[id number]. Topic: Medications - Medication Question  >> May 29, 2025  8:56 AM Scarlett wrote:  .Who Called: Lauren Ring    Caller is requesting assistance/information from provider's office.    Symptoms (please be specific):    How long has patient had these symptoms:    List of preferred pharmacies on file (remove unneeded): [unfilled]  If different, enter pharmacy into here including location and phone number:       Preferred Method of Contact: Phone Call  Patient's Preferred Phone Number on File: [phone]   Best Call Back Number, if different:  Additional Information: anxiety med reduced - having trouble sleeping

## 2025-06-04 ENCOUNTER — HOSPITAL ENCOUNTER (OUTPATIENT)
Dept: RADIOLOGY | Facility: CLINIC | Age: 65
Discharge: HOME | End: 2025-06-04
Attending: ORTHOPAEDIC SURGERY
Payer: MEDICARE

## 2025-06-04 ENCOUNTER — OFFICE VISIT (OUTPATIENT)
Dept: ORTHOPEDICS | Facility: CLINIC | Age: 65
End: 2025-06-04
Payer: MEDICARE

## 2025-06-04 VITALS — BODY MASS INDEX: 18.49 KG/M2 | HEIGHT: 66 IN | WEIGHT: 115.06 LBS

## 2025-06-04 DIAGNOSIS — S72.002D CLOSED FRACTURE OF NECK OF LEFT FEMUR WITH ROUTINE HEALING, SUBSEQUENT ENCOUNTER: Primary | ICD-10-CM

## 2025-06-04 DIAGNOSIS — S72.002D CLOSED FRACTURE OF NECK OF LEFT FEMUR WITH ROUTINE HEALING, SUBSEQUENT ENCOUNTER: ICD-10-CM

## 2025-06-04 PROCEDURE — 73502 X-RAY EXAM HIP UNI 2-3 VIEWS: CPT | Mod: LT,,, | Performed by: ORTHOPAEDIC SURGERY

## 2025-06-10 DIAGNOSIS — F41.1 GAD (GENERALIZED ANXIETY DISORDER): ICD-10-CM

## 2025-06-10 DIAGNOSIS — F51.01 PRIMARY INSOMNIA: Primary | Chronic | ICD-10-CM

## 2025-06-10 RX ORDER — ALPRAZOLAM 0.5 MG/1
TABLET ORAL
Qty: 90 TABLET | Refills: 5 | Status: SHIPPED | OUTPATIENT
Start: 2025-06-10

## 2025-06-16 ENCOUNTER — TELEPHONE (OUTPATIENT)
Dept: PRIMARY CARE CLINIC | Facility: CLINIC | Age: 65
End: 2025-06-16
Payer: MEDICARE

## 2025-06-16 NOTE — TELEPHONE ENCOUNTER
Copied from CRM #[id number]. Topic: General Inquiry - Patient Advice  >> Jun 16, 2025 12:31 PM Karla wrote:  Pt requesting a call back pt has a headache,throat pain,ear ache,low grade fever call back [phone]

## 2025-06-16 NOTE — TELEPHONE ENCOUNTER
Called and spoke to pt, she stated she is taking OTC Tylenol for her headache, and low grade fever She states the Tylenol is not helping at all for the headaches. I advised 's next opening is not until Wednesday. I advised her to try the urgent care clinic since I didn't want her to wait until Wednesday to be seen. Pt stated she will try the urgent care clinic when her  gets off.

## 2025-08-05 PROBLEM — F33.42 RECURRENT MAJOR DEPRESSIVE DISORDER, IN FULL REMISSION: Chronic | Status: ACTIVE | Noted: 2025-08-05

## 2025-08-05 PROBLEM — J43.2 CENTRILOBULAR EMPHYSEMA: Chronic | Status: ACTIVE | Noted: 2022-10-04

## 2025-08-05 PROBLEM — D64.9 NORMOCYTIC ANEMIA: Status: ACTIVE | Noted: 2025-08-05

## 2025-08-05 NOTE — PROGRESS NOTES
Subjective:       Patient ID: Lauren Ring is a 65 y.o. female.    Chief Complaint:  Chronic Condition Follow-up    Patient presents to the clinic today for chronic condition follow-up.  Doing well, no specific complaints, tolerating all medications, reporting no significant side effects or problems.     Last wellness exam was 02/03/2025.     Chronic conditions being treated include but are not limited to anxiety with depression, primary insomnia, pulmonary emphysema, oxygen dependent, only at night.    She continues to have significant problems with insomnia, she has gone for up to three nights without sleep.  The alprazolam helps her to relax, but does not help her to sleep necessarily.  She does have a strong family history of insomnia, multiple siblings and possibly her mother.        Review of Systems   Constitutional: Negative.  Negative for fatigue and fever.   HENT: Negative.  Negative for sore throat and trouble swallowing.    Eyes: Negative.  Negative for redness and visual disturbance.   Respiratory: Negative.  Negative for chest tightness and shortness of breath.    Cardiovascular: Negative.  Negative for chest pain.   Gastrointestinal: Negative.  Negative for abdominal pain, blood in stool and nausea.   Endocrine: Negative.  Negative for cold intolerance, heat intolerance and polyuria.   Genitourinary: Negative.    Musculoskeletal: Negative.  Negative for arthralgias, gait problem and joint swelling.   Integumentary:  Negative for rash. Negative.   Neurological: Negative.  Negative for dizziness, weakness and headaches.   Psychiatric/Behavioral:  Positive for sleep disturbance. Negative for agitation and dysphoric mood.            Patient Care Team:  Jose Juan Dunn MD as PCP - General (Family Medicine)  Javan Hassan DO as Consulting Physician (Orthopedic Surgery)  Santana Braga MD as Consulting Physician (Pulmonary Disease)  Marta Cortez -  Objective:   Visit Vitals  /71   Pulse 78    Resp 18   Wt 51.7 kg (114 lb)   LMP  (LMP Unknown)   SpO2 98%   BMI 18.41 kg/m²        Physical Exam  Constitutional:       Appearance: Normal appearance.   HENT:      Head: Normocephalic.      Mouth/Throat:      Mouth: Mucous membranes are moist.      Pharynx: Oropharynx is clear.   Eyes:      Conjunctiva/sclera: Conjunctivae normal.      Pupils: Pupils are equal, round, and reactive to light.   Cardiovascular:      Rate and Rhythm: Normal rate and regular rhythm.      Heart sounds: Normal heart sounds.   Pulmonary:      Effort: Pulmonary effort is normal.      Breath sounds: Normal breath sounds.   Abdominal:      General: Abdomen is flat.      Palpations: Abdomen is soft.   Musculoskeletal:         General: Normal range of motion.      Cervical back: Neck supple.   Skin:     General: Skin is warm and dry.   Neurological:      General: No focal deficit present.      Mental Status: She is alert and oriented to person, place, and time.   Psychiatric:         Mood and Affect: Mood normal.         Behavior: Behavior normal.         Thought Content: Thought content normal.         Judgment: Judgment normal.           Lab Results   Component Value Date    GLU 88 03/11/2025     (H) 03/11/2025    K 3.6 03/11/2025     (H) 03/11/2025    CO2 30 03/11/2025    BUN <3.0 (L) 03/11/2025    CREATININE 0.55 03/11/2025    CALCIUM 8.0 (L) 03/11/2025    PROT 5.2 (L) 03/11/2025    ALBUMIN 1.9 (L) 03/11/2025    BILITOT 0.2 03/11/2025    ALKPHOS 92 03/11/2025    AST 15 03/11/2025    ALT 10 03/11/2025    EGFRNORACEVR >60 03/11/2025     Lab Results   Component Value Date    WBC 7.80 03/11/2025    RBC 3.87 (L) 03/11/2025    HGB 10.4 (L) 03/11/2025    HCT 34.5 (L) 03/11/2025    MCV 89.1 03/11/2025    RDW 18.4 (H) 03/11/2025     03/11/2025      Lab Results   Component Value Date    CHOL 122 03/11/2025    TRIG 87 03/11/2025    HDL 46 03/11/2025    TOTALCHOLEST 3 03/11/2025     Lab Results   Component Value Date    TSH  0.916 03/11/2025     Lab Results   Component Value Date    HGBA1C 4.7 12/27/2024       Assessment:       ICD-10-CM ICD-9-CM   1. Recurrent major depressive disorder, in full remission  F33.42 296.36   2. Normocytic anemia  D64.9 285.9   3. ELIZABETH (generalized anxiety disorder)  F41.1 300.02   4. Centrilobular emphysema  J43.2 492.8   5. On home O2  Z99.81 V46.2   6. OAB (overactive bladder)  N32.81 596.51   7. Positive FIT (fecal immunochemical test)  R19.5 792.1   8. GERD without esophagitis  K21.9 530.81   9. Primary insomnia  F51.01 307.42   10. Dependence on other enabling machines and devices  Z99.89 V46.8   11. Wellness examination  Z00.00 V70.0       Current Outpatient Medications   Medication Instructions    albuterol (PROAIR HFA) 90 mcg/actuation inhaler 2 puffs, Inhalation, Every 6 hours PRN    albuterol-ipratropium (DUO-NEB) 2.5 mg-0.5 mg/3 mL nebulizer solution 3 mLs, Nebulization, 3 times daily PRN    ALPRAZolam (XANAX) 0.5 MG tablet Take one tablet p.o. q.a.m., two tablets p.o. q.h.s..    aspirin (ECOTRIN) 81 mg, Daily    eszopiclone (LUNESTA) 2 mg, Oral, Nightly PRN    ferrous sulfate (FEOSOL) 325 mg, Oral, Daily    LIDOcaine viscous HCl 2% (XYLOCAINE) 2 % Soln SMARTSIG:10 Milliliter(s) By Mouth Every 3 Hours    loratadine (CLARITIN) 10 mg, Daily    SPIRIVA RESPIMAT 2.5 mcg/actuation inhaler 2 puffs, Inhalation, Daily    SYMBICORT 160-4.5 mcg/actuation HFAA 2 puffs, Inhalation, Every 12 hours    TRINTELLIX 5 mg, Oral, Daily     Plan:     Problem List Items Addressed This Visit          Psychiatric    Recurrent major depressive disorder, in full remission - Primary (Chronic)    Overview   Prescribed Trintellix 5 mg daily, alprazolam 0.5 mg.  T.i.d., takes two at night for insomnia.     Patient reports stability of moods, and effectiveness of medications, including no agitation, significant somnolence, or significant bouts of anxiety or depression.  Patient is not having any sleep disturbance.  Current  treatment plan will continue, with plans to discuss any significant changes in patient's status if necessary if anything changes in the future..     Medication will be continued at current dosage.         ELIZABETH (generalized anxiety disorder)    Overview   Prescribed alprazolam one p.o. q.a.m., two p.o. q.h.s..    Patient has a diagnosis of generalized anxiety disorder.  Medication/treatment plan has been effective in controlling persistent feelings of anxiety, as well as decreasing the chances of episodes of acute anxiety and/or panic attack.  If benzodiazepines are being prescribed and used, there are ongoing discussions pertaining to regular use of this type of medication, including the potential development of tolerance and/or rebound anxiety that may occur over time, and potential side effects including impairment during activities such as driving.  Patient reports no significant problems or side effects with the medication.    Medication will be continued at current dosage.            Pulmonary    Centrilobular emphysema (Chronic)    Overview   Prescribed albuterol, DuoNeb, Symbicort.  She does see pulmonology, Dr. Braga.    Patient sees a specialist for this condition.  This medical condition appears to be stable, patient will continue regular follow-up with the treating specialist.         On home O2 (Chronic)    Overview   Patient is on home oxygen, especially at night.                 Renal/    OAB (overactive bladder)    Overview   Prescribed Ditropan XL 5 mg t.i.d..    Start medication, we will continue to monitor this situation over time.            Oncology    Positive FIT (fecal immunochemical test) (Chronic)    Overview   Positive FIT test, done at home through her insurance.  She does not wish to investigate this any further.         Normocytic anemia    Overview   Patient has a diagnosis of anemia, etiology uncertain.  Patient has had previous or current hemoglobin levels that have been below  normal.  Anemia has been mild and asymptomatic.  We will continue to monitor regularly.  If anemia changes in any significant way, or if any significant symptoms develop which could be attributed to the anemia, more aggressive evaluation, treatment, and possibly referral will be considered.         Relevant Orders    CBC Auto Differential       GI    GERD without esophagitis (Chronic)    Overview   Takes Pepcid 40 mg daily.    This medical condition is currently stable on prescription medication, continue current treatment plan.            Other    Primary insomnia (Chronic)    Overview   Prescribed Lunesta 2 mg q.h.s. p.r.n. insomnia.  She should try to take her alprazolam earlier in the evening before she tries the Lunesta.    A medication for chronic insomnia is being prescribed.  Optimally, this medication should be used only as needed.  Regular use of this medication could actually make insomnia worse over time.  However, if insomnia is already chronic, and persistent, it is advisable to use the medication on a regular basis.  However, the medication should be used only in anticipation of a full night's sleep.  Also, side effects of prescribed medication have been discussed.         Relevant Medications    eszopiclone (LUNESTA) 2 MG Tab    Dependence on other enabling machines and devices (Chronic)    Overview   Patient does use a walker for ambulation, is frail, weak, has a diagnosis of COPD and liver cirrhosis.  She is also on supplemental oxygen at times at home.  For these, and other reasons there are certain screening tests that will be deferred indefinitely, including colon cancer screening, and breast cancer screening.          Other Visit Diagnoses         Wellness examination        This diagnosis does not apply to this visit, wellness exam with pre visit labs will be scheduled/ordered for future visit.    Relevant Orders    Comprehensive Metabolic Panel    Lipid Panel    Hemoglobin A1C    TSH                     Follow up in about 6 months (around 2/5/2026) for Wellness.    This note was created with the assistance of [employer] voice recognition software or phone dictation. There may be transcription errors as a result of using this technology. Effort has been made to assure accuracy of transcription but any obvious errors or omissions should be clarified with the author of the document.

## 2025-08-06 ENCOUNTER — OFFICE VISIT (OUTPATIENT)
Dept: PRIMARY CARE CLINIC | Facility: CLINIC | Age: 65
End: 2025-08-06
Payer: MEDICARE

## 2025-08-06 VITALS
WEIGHT: 114 LBS | OXYGEN SATURATION: 98 % | HEART RATE: 78 BPM | SYSTOLIC BLOOD PRESSURE: 118 MMHG | BODY MASS INDEX: 18.41 KG/M2 | DIASTOLIC BLOOD PRESSURE: 71 MMHG | RESPIRATION RATE: 18 BRPM

## 2025-08-06 DIAGNOSIS — F33.42 RECURRENT MAJOR DEPRESSIVE DISORDER, IN FULL REMISSION: Primary | Chronic | ICD-10-CM

## 2025-08-06 DIAGNOSIS — J43.2 CENTRILOBULAR EMPHYSEMA: Chronic | ICD-10-CM

## 2025-08-06 DIAGNOSIS — F41.1 GAD (GENERALIZED ANXIETY DISORDER): ICD-10-CM

## 2025-08-06 DIAGNOSIS — N32.81 OAB (OVERACTIVE BLADDER): ICD-10-CM

## 2025-08-06 DIAGNOSIS — R19.5 POSITIVE FIT (FECAL IMMUNOCHEMICAL TEST): Chronic | ICD-10-CM

## 2025-08-06 DIAGNOSIS — Z99.89 DEPENDENCE ON OTHER ENABLING MACHINES AND DEVICES: Chronic | ICD-10-CM

## 2025-08-06 DIAGNOSIS — Z00.00 WELLNESS EXAMINATION: ICD-10-CM

## 2025-08-06 DIAGNOSIS — K21.9 GERD WITHOUT ESOPHAGITIS: Chronic | ICD-10-CM

## 2025-08-06 DIAGNOSIS — F51.01 PRIMARY INSOMNIA: Chronic | ICD-10-CM

## 2025-08-06 DIAGNOSIS — D64.9 NORMOCYTIC ANEMIA: ICD-10-CM

## 2025-08-06 DIAGNOSIS — Z99.81 ON HOME O2: Chronic | ICD-10-CM

## 2025-08-06 RX ORDER — LIDOCAINE HYDROCHLORIDE 20 MG/ML
SOLUTION OROPHARYNGEAL
COMMUNITY
Start: 2025-06-16

## 2025-08-06 RX ORDER — ESZOPICLONE 2 MG/1
2 TABLET, FILM COATED ORAL NIGHTLY PRN
Qty: 30 TABLET | Refills: 5 | Status: SHIPPED | OUTPATIENT
Start: 2025-08-06 | End: 2026-02-02

## 2025-08-06 RX ORDER — FERROUS SULFATE 325(65) MG
325 TABLET ORAL DAILY
COMMUNITY

## (undated) DEVICE — SPONGE GAUZE 16PLY 4X4

## (undated) DEVICE — GOWN SMARTGOWN 3XL XLONG

## (undated) DEVICE — KIT SURGICAL TURNOVER

## (undated) DEVICE — Device

## (undated) DEVICE — RETRIEVER SUTURE HEWSON DISP

## (undated) DEVICE — DRESSING TELFA + RECT 6X10IN

## (undated) DEVICE — GAUZE SPONGE 4X4 12PLY

## (undated) DEVICE — SUT FIBERWIRE #5 1/2 X 38

## (undated) DEVICE — SUT VICRYL PLUS 1 CTX 36IN

## (undated) DEVICE — SOL NACL IRR 1000ML BTL

## (undated) DEVICE — SUT ABSRB MONO 2-0 CT-2 27IN

## (undated) DEVICE — DRESSING XEROFORM 5X9IN

## (undated) DEVICE — DRESSING XEROFORM FOIL 4X4

## (undated) DEVICE — GLOVE PROTEXIS BLUE LATEX 9

## (undated) DEVICE — SPONGE COTTON TRAY 4X4IN

## (undated) DEVICE — APPLICATOR CHLORAPREP ORN 26ML

## (undated) DEVICE — SUT 5 30IN ETHIBOND GRN BR

## (undated) DEVICE — PAD CAST SPECIALIST STRL 6

## (undated) DEVICE — GEL SURGX ANTIMICROBIAL 7.5ML

## (undated) DEVICE — KIT TOTAL HIP HLGC

## (undated) DEVICE — CLOSURE SKIN STERI STRIP 1/2X4

## (undated) DEVICE — SEE MEDLINE ITEM 157173

## (undated) DEVICE — SEE MEDLINE ITEM 157166

## (undated) DEVICE — COVER TABLE HVY DTY 60X90IN

## (undated) DEVICE — SUT MONOCRYL 3-0 PS-2 UND

## (undated) DEVICE — PAD CAST 6X4YD SPECIALISTIC

## (undated) DEVICE — GLOVE PROTEXIS HYDROGEL SZ6

## (undated) DEVICE — GLOVE PROTEXIS HYDROGEL SZ8

## (undated) DEVICE — DRAPE STERI U-SHAPED 47X51IN

## (undated) DEVICE — DRAPE FULL SHEET 70X100IN

## (undated) DEVICE — DRESSING WND GZ 10X4X8X2IN

## (undated) DEVICE — STRIP WOUND PK IDOFORM 180X.5

## (undated) DEVICE — BLADE SAW SAG 22.13MM 0.92MM

## (undated) DEVICE — ELECTRODE REM POLYHESIVE II

## (undated) DEVICE — COVER FULLGUARD SHOE HIGH-TOP

## (undated) DEVICE — SEE MEDLINE ITEM 146270

## (undated) DEVICE — SUT MCRYL PLUS 3-0 PS2 27IN

## (undated) DEVICE — GLOVE PROTEXIS NEU-THERA SZ6

## (undated) DEVICE — ELECTRODE PATIENT RETURN DISP

## (undated) DEVICE — SUT CTD VICRYL CT-1 27

## (undated) DEVICE — SUT MONOCRYL 2-0 S UND

## (undated) DEVICE — PADDING 4X4YD SPECIALIST100

## (undated) DEVICE — SOL NACL IRR 3000ML

## (undated) DEVICE — GLOVE PROTEXIS LTX MICRO 8

## (undated) DEVICE — DRAPE INCISE IOBAN 2 23X23IN

## (undated) DEVICE — GLOVE PROTEXIS BLUE LATEX 6.5

## (undated) DEVICE — DEVICE CLSR PDS PLUS 1 CT 18IN

## (undated) DEVICE — PAD CAST SPECIALIST STRL 4

## (undated) DEVICE — SYS IRRISEPT 450ML0.05% CHG

## (undated) DEVICE — GLOVE PROTEXIS HYDROGEL SZ9

## (undated) DEVICE — DRAPE IOBAN 6635

## (undated) DEVICE — STRIP MEDI WND CLSR 1/2X4IN

## (undated) DEVICE — GOWN SURGICAL XX LARGE X LONG